# Patient Record
Sex: FEMALE | Race: OTHER | HISPANIC OR LATINO
[De-identification: names, ages, dates, MRNs, and addresses within clinical notes are randomized per-mention and may not be internally consistent; named-entity substitution may affect disease eponyms.]

---

## 2017-01-05 ENCOUNTER — RESULT REVIEW (OUTPATIENT)
Age: 77
End: 2017-01-05

## 2017-01-06 ENCOUNTER — INPATIENT (INPATIENT)
Facility: HOSPITAL | Age: 77
LOS: 0 days | Discharge: HOME | DRG: 419 | End: 2017-01-07
Attending: SURGERY | Admitting: SURGERY
Payer: MEDICARE

## 2017-01-06 VITALS
TEMPERATURE: 98 F | DIASTOLIC BLOOD PRESSURE: 72 MMHG | OXYGEN SATURATION: 98 % | SYSTOLIC BLOOD PRESSURE: 171 MMHG | HEART RATE: 86 BPM | RESPIRATION RATE: 22 BRPM

## 2017-01-06 DIAGNOSIS — Z01.818 ENCOUNTER FOR OTHER PREPROCEDURAL EXAMINATION: ICD-10-CM

## 2017-01-06 DIAGNOSIS — K81.1 CHRONIC CHOLECYSTITIS: ICD-10-CM

## 2017-01-06 DIAGNOSIS — K91.86 RETAINED CHOLELITHIASIS FOLLOWING CHOLECYSTECTOMY: ICD-10-CM

## 2017-01-06 PROCEDURE — 88304 TISSUE EXAM BY PATHOLOGIST: CPT | Mod: 26

## 2017-01-06 RX ORDER — ALBUTEROL 90 UG/1
2 AEROSOL, METERED ORAL EVERY 6 HOURS
Qty: 0 | Refills: 0 | Status: DISCONTINUED | OUTPATIENT
Start: 2017-01-06 | End: 2017-01-07

## 2017-01-06 RX ORDER — HEPARIN SODIUM 5000 [USP'U]/ML
5000 INJECTION INTRAVENOUS; SUBCUTANEOUS EVERY 8 HOURS
Qty: 0 | Refills: 0 | Status: DISCONTINUED | OUTPATIENT
Start: 2017-01-06 | End: 2017-01-07

## 2017-01-06 RX ORDER — FLUTICASONE PROPIONATE AND SALMETEROL 50; 250 UG/1; UG/1
1 POWDER ORAL; RESPIRATORY (INHALATION)
Qty: 0 | Refills: 0 | Status: DISCONTINUED | OUTPATIENT
Start: 2017-01-06 | End: 2017-01-07

## 2017-01-06 RX ORDER — SODIUM CHLORIDE 9 MG/ML
1000 INJECTION, SOLUTION INTRAVENOUS
Qty: 0 | Refills: 0 | Status: DISCONTINUED | OUTPATIENT
Start: 2017-01-06 | End: 2017-01-07

## 2017-01-06 RX ORDER — CEFOTETAN DISODIUM 1 G
2 VIAL (EA) INJECTION ONCE
Qty: 0 | Refills: 0 | Status: DISCONTINUED | OUTPATIENT
Start: 2017-01-06 | End: 2017-01-07

## 2017-01-06 RX ORDER — MORPHINE SULFATE 50 MG/1
2 CAPSULE, EXTENDED RELEASE ORAL
Qty: 0 | Refills: 0 | Status: DISCONTINUED | OUTPATIENT
Start: 2017-01-06 | End: 2017-01-06

## 2017-01-06 RX ORDER — ONDANSETRON 8 MG/1
4 TABLET, FILM COATED ORAL ONCE
Qty: 0 | Refills: 0 | Status: DISCONTINUED | OUTPATIENT
Start: 2017-01-06 | End: 2017-01-07

## 2017-01-06 RX ORDER — SODIUM CHLORIDE 9 MG/ML
3 INJECTION INTRAMUSCULAR; INTRAVENOUS; SUBCUTANEOUS EVERY 8 HOURS
Qty: 0 | Refills: 0 | Status: DISCONTINUED | OUTPATIENT
Start: 2017-01-06 | End: 2017-01-06

## 2017-01-06 RX ADMIN — SODIUM CHLORIDE 50 MILLILITER(S): 9 INJECTION, SOLUTION INTRAVENOUS at 22:15

## 2017-01-06 NOTE — ASU DISCHARGE PLAN (ADULT/PEDIATRIC). - NURSING INSTRUCTIONS
PRECAUTIONS—When Should I call the Physician?  Diarrhea: Occasional loose bowel movements are not uncommon. However, constant watery  diarrhea.  Fever with or without cough: This could be a sign of lung, wound or stomach infection.  Elevated heart rate: If your heart rate is more than 100 beats per minute, this could be a sign of  infection.  Sudden shortness of breath and/or chest pain  Sudden new stomach pain: This could be a sign of leakage around your stomach or an infection  in your stomach.  Wound drainage: gold colored drainage is normal, call however if your incisional drainage is  green, brown, has a foul odor, or becomes red and irritated.

## 2017-01-06 NOTE — ASU DISCHARGE PLAN (ADULT/PEDIATRIC). - MEDICATION SUMMARY - MEDICATIONS TO TAKE
I will START or STAY ON the medications listed below when I get home from the hospital:    Percocet 5/325 325 mg-5 mg oral tablet  -- 1 tab(s) by mouth every 4 hours, As Needed MDD:8 tabs  -- Caution federal law prohibits the transfer of this drug to any person other  than the person for whom it was prescribed.  May cause drowsiness.  Alcohol may intensify this effect.  Use care when operating dangerous machinery.  This prescription cannot be refilled.  This product contains acetaminophen.  Do not use  with any other product containing acetaminophen to prevent possible liver damage.  Using more of this medication than prescribed may cause serious breathing problems.    -- Indication: For pain    Symbicort 160 mcg-4.5 mcg/inh inhalation aerosol  -- 2 puff(s) inhaled 2 times a day  -- Indication: For Copd    ProAir HFA 90 mcg/inh inhalation aerosol  -- 2 puff(s) inhaled 4 times a day, As Needed  -- Indication: For Copd

## 2017-01-06 NOTE — ASU DISCHARGE PLAN (ADULT/PEDIATRIC). - NOTIFY
Persistent Nausea and Vomiting/Fever greater than 101/Unable to Urinate/Pain not relieved by Medications/Bleeding that does not stop

## 2017-01-06 NOTE — ASU DISCHARGE PLAN (ADULT/PEDIATRIC). - SPECIAL INSTRUCTIONS
There are small white bandaids over your incisions. They will either fall off on their own or be taken off in the office. Please allow water and soap to run over your incisions and pat them dry. Do not scrub the incisions.

## 2017-01-07 VITALS
OXYGEN SATURATION: 95 % | DIASTOLIC BLOOD PRESSURE: 54 MMHG | HEART RATE: 72 BPM | TEMPERATURE: 98 F | RESPIRATION RATE: 15 BRPM | SYSTOLIC BLOOD PRESSURE: 112 MMHG

## 2017-01-07 PROCEDURE — 94640 AIRWAY INHALATION TREATMENT: CPT

## 2017-01-07 PROCEDURE — 88304 TISSUE EXAM BY PATHOLOGIST: CPT

## 2017-01-07 PROCEDURE — C1889: CPT

## 2017-01-07 RX ADMIN — HEPARIN SODIUM 5000 UNIT(S): 5000 INJECTION INTRAVENOUS; SUBCUTANEOUS at 06:08

## 2017-01-07 RX ADMIN — FLUTICASONE PROPIONATE AND SALMETEROL 1 DOSE(S): 50; 250 POWDER ORAL; RESPIRATORY (INHALATION) at 06:08

## 2017-01-11 LAB — SURGICAL PATHOLOGY STUDY: SIGNIFICANT CHANGE UP

## 2017-04-14 ENCOUNTER — OUTPATIENT (OUTPATIENT)
Dept: OUTPATIENT SERVICES | Facility: HOSPITAL | Age: 77
LOS: 1 days | End: 2017-04-14
Payer: MEDICARE

## 2017-04-14 ENCOUNTER — APPOINTMENT (OUTPATIENT)
Dept: ULTRASOUND IMAGING | Facility: CLINIC | Age: 77
End: 2017-04-14

## 2017-04-14 DIAGNOSIS — Z00.8 ENCOUNTER FOR OTHER GENERAL EXAMINATION: ICD-10-CM

## 2017-04-14 PROCEDURE — 76700 US EXAM ABDOM COMPLETE: CPT

## 2017-09-28 ENCOUNTER — APPOINTMENT (OUTPATIENT)
Dept: OPHTHALMOLOGY | Facility: CLINIC | Age: 77
End: 2017-09-28
Payer: MEDICARE

## 2017-09-28 PROCEDURE — 68761 CLOSE TEAR DUCT OPENING: CPT | Mod: E2,E4

## 2017-09-28 PROCEDURE — 92134 CPTRZ OPH DX IMG PST SGM RTA: CPT

## 2017-09-28 PROCEDURE — 99204 OFFICE O/P NEW MOD 45 MIN: CPT

## 2017-09-28 PROCEDURE — 92002 INTRM OPH EXAM NEW PATIENT: CPT | Mod: 25

## 2017-10-09 ENCOUNTER — INPATIENT (INPATIENT)
Facility: HOSPITAL | Age: 77
LOS: 3 days | Discharge: HOME HEALTH | End: 2017-10-13
Attending: INTERNAL MEDICINE | Admitting: INTERNAL MEDICINE
Payer: MEDICARE

## 2017-10-09 VITALS
HEART RATE: 80 BPM | DIASTOLIC BLOOD PRESSURE: 85 MMHG | OXYGEN SATURATION: 89 % | RESPIRATION RATE: 24 BRPM | SYSTOLIC BLOOD PRESSURE: 197 MMHG | TEMPERATURE: 98 F

## 2017-10-09 RX ORDER — IPRATROPIUM/ALBUTEROL SULFATE 18-103MCG
3 AEROSOL WITH ADAPTER (GRAM) INHALATION ONCE
Qty: 0 | Refills: 0 | Status: COMPLETED | OUTPATIENT
Start: 2017-10-09 | End: 2017-10-09

## 2017-10-09 RX ADMIN — Medication 3 MILLILITER(S): at 23:57

## 2017-10-09 NOTE — ED ADULT TRIAGE NOTE - CHIEF COMPLAINT QUOTE
pt c/o increased SOB becoming wore this afternoon. pt using at home O2 3L, sat=89%. pt tachypneic in triage.

## 2017-10-10 DIAGNOSIS — I73.00 RAYNAUD'S SYNDROME WITHOUT GANGRENE: ICD-10-CM

## 2017-10-10 DIAGNOSIS — J44.1 CHRONIC OBSTRUCTIVE PULMONARY DISEASE WITH (ACUTE) EXACERBATION: ICD-10-CM

## 2017-10-10 DIAGNOSIS — K21.9 GASTRO-ESOPHAGEAL REFLUX DISEASE WITHOUT ESOPHAGITIS: ICD-10-CM

## 2017-10-10 DIAGNOSIS — J96.91 RESPIRATORY FAILURE, UNSPECIFIED WITH HYPOXIA: ICD-10-CM

## 2017-10-10 LAB
ALBUMIN SERPL ELPH-MCNC: 3.9 G/DL — SIGNIFICANT CHANGE UP (ref 3.3–5)
ALP SERPL-CCNC: 137 U/L — HIGH (ref 40–120)
ALT FLD-CCNC: 25 U/L — SIGNIFICANT CHANGE UP (ref 4–33)
AST SERPL-CCNC: 31 U/L — SIGNIFICANT CHANGE UP (ref 4–32)
B PERT DNA SPEC QL NAA+PROBE: SIGNIFICANT CHANGE UP
BASE EXCESS BLDV CALC-SCNC: 1 MMOL/L — SIGNIFICANT CHANGE UP
BASE EXCESS BLDV CALC-SCNC: 5.4 MMOL/L — SIGNIFICANT CHANGE UP
BASOPHILS # BLD AUTO: 0.04 K/UL — SIGNIFICANT CHANGE UP (ref 0–0.2)
BASOPHILS NFR BLD AUTO: 0.4 % — SIGNIFICANT CHANGE UP (ref 0–2)
BILIRUB SERPL-MCNC: 0.3 MG/DL — SIGNIFICANT CHANGE UP (ref 0.2–1.2)
BLOOD GAS VENOUS - CREATININE: 0.97 MG/DL — SIGNIFICANT CHANGE UP (ref 0.5–1.3)
BLOOD GAS VENOUS - CREATININE: 1.11 MG/DL — SIGNIFICANT CHANGE UP (ref 0.5–1.3)
BUN SERPL-MCNC: 25 MG/DL — HIGH (ref 7–23)
C PNEUM DNA SPEC QL NAA+PROBE: NOT DETECTED — SIGNIFICANT CHANGE UP
CALCIUM SERPL-MCNC: 9.9 MG/DL — SIGNIFICANT CHANGE UP (ref 8.4–10.5)
CHLORIDE BLDV-SCNC: 106 MMOL/L — SIGNIFICANT CHANGE UP (ref 96–108)
CHLORIDE BLDV-SCNC: 108 MMOL/L — SIGNIFICANT CHANGE UP (ref 96–108)
CHLORIDE SERPL-SCNC: 107 MMOL/L — SIGNIFICANT CHANGE UP (ref 98–107)
CO2 SERPL-SCNC: 27 MMOL/L — SIGNIFICANT CHANGE UP (ref 22–31)
CREAT SERPL-MCNC: 1.31 MG/DL — HIGH (ref 0.5–1.3)
EOSINOPHIL # BLD AUTO: 0.12 K/UL — SIGNIFICANT CHANGE UP (ref 0–0.5)
EOSINOPHIL NFR BLD AUTO: 1.2 % — SIGNIFICANT CHANGE UP (ref 0–6)
FLUAV H1 2009 PAND RNA SPEC QL NAA+PROBE: NOT DETECTED — SIGNIFICANT CHANGE UP
FLUAV H1 RNA SPEC QL NAA+PROBE: NOT DETECTED — SIGNIFICANT CHANGE UP
FLUAV H3 RNA SPEC QL NAA+PROBE: NOT DETECTED — SIGNIFICANT CHANGE UP
FLUAV SUBTYP SPEC NAA+PROBE: SIGNIFICANT CHANGE UP
FLUBV RNA SPEC QL NAA+PROBE: NOT DETECTED — SIGNIFICANT CHANGE UP
GAS PNL BLDV: 137 MMOL/L — SIGNIFICANT CHANGE UP (ref 136–146)
GAS PNL BLDV: 142 MMOL/L — SIGNIFICANT CHANGE UP (ref 136–146)
GLUCOSE BLDV-MCNC: 188 — HIGH (ref 70–99)
GLUCOSE BLDV-MCNC: 84 — SIGNIFICANT CHANGE UP (ref 70–99)
GLUCOSE SERPL-MCNC: 81 MG/DL — SIGNIFICANT CHANGE UP (ref 70–99)
HADV DNA SPEC QL NAA+PROBE: NOT DETECTED — SIGNIFICANT CHANGE UP
HCO3 BLDV-SCNC: 25 MMOL/L — SIGNIFICANT CHANGE UP (ref 20–27)
HCO3 BLDV-SCNC: 25 MMOL/L — SIGNIFICANT CHANGE UP (ref 20–27)
HCOV 229E RNA SPEC QL NAA+PROBE: NOT DETECTED — SIGNIFICANT CHANGE UP
HCOV HKU1 RNA SPEC QL NAA+PROBE: NOT DETECTED — SIGNIFICANT CHANGE UP
HCOV NL63 RNA SPEC QL NAA+PROBE: NOT DETECTED — SIGNIFICANT CHANGE UP
HCOV OC43 RNA SPEC QL NAA+PROBE: NOT DETECTED — SIGNIFICANT CHANGE UP
HCT VFR BLD CALC: 47 % — HIGH (ref 34.5–45)
HCT VFR BLDV CALC: 43.1 % — SIGNIFICANT CHANGE UP (ref 34.5–45)
HCT VFR BLDV CALC: 46.9 % — HIGH (ref 34.5–45)
HGB BLD-MCNC: 14.8 G/DL — SIGNIFICANT CHANGE UP (ref 11.5–15.5)
HGB BLDV-MCNC: 14 G/DL — SIGNIFICANT CHANGE UP (ref 11.5–15.5)
HGB BLDV-MCNC: 15.3 G/DL — SIGNIFICANT CHANGE UP (ref 11.5–15.5)
HMPV RNA SPEC QL NAA+PROBE: NOT DETECTED — SIGNIFICANT CHANGE UP
HPIV1 RNA SPEC QL NAA+PROBE: NOT DETECTED — SIGNIFICANT CHANGE UP
HPIV2 RNA SPEC QL NAA+PROBE: NOT DETECTED — SIGNIFICANT CHANGE UP
HPIV3 RNA SPEC QL NAA+PROBE: NOT DETECTED — SIGNIFICANT CHANGE UP
HPIV4 RNA SPEC QL NAA+PROBE: NOT DETECTED — SIGNIFICANT CHANGE UP
IMM GRANULOCYTES # BLD AUTO: 0.03 # — SIGNIFICANT CHANGE UP
IMM GRANULOCYTES NFR BLD AUTO: 0.3 % — SIGNIFICANT CHANGE UP (ref 0–1.5)
LACTATE BLDV-MCNC: 1.5 MMOL/L — SIGNIFICANT CHANGE UP (ref 0.5–2)
LACTATE BLDV-MCNC: 2.9 MMOL/L — HIGH (ref 0.5–2)
LYMPHOCYTES # BLD AUTO: 3.58 K/UL — HIGH (ref 1–3.3)
LYMPHOCYTES # BLD AUTO: 37.2 % — SIGNIFICANT CHANGE UP (ref 13–44)
M PNEUMO DNA SPEC QL NAA+PROBE: NOT DETECTED — SIGNIFICANT CHANGE UP
MCHC RBC-ENTMCNC: 30.8 PG — SIGNIFICANT CHANGE UP (ref 27–34)
MCHC RBC-ENTMCNC: 31.5 % — LOW (ref 32–36)
MCV RBC AUTO: 97.7 FL — SIGNIFICANT CHANGE UP (ref 80–100)
MONOCYTES # BLD AUTO: 0.99 K/UL — HIGH (ref 0–0.9)
MONOCYTES NFR BLD AUTO: 10.3 % — SIGNIFICANT CHANGE UP (ref 2–14)
NEUTROPHILS # BLD AUTO: 4.87 K/UL — SIGNIFICANT CHANGE UP (ref 1.8–7.4)
NEUTROPHILS NFR BLD AUTO: 50.6 % — SIGNIFICANT CHANGE UP (ref 43–77)
NRBC # FLD: 0 — SIGNIFICANT CHANGE UP
PCO2 BLDV: 44 MMHG — SIGNIFICANT CHANGE UP (ref 41–51)
PCO2 BLDV: 65 MMHG — HIGH (ref 41–51)
PH BLDV: 7.31 PH — LOW (ref 7.32–7.43)
PH BLDV: 7.38 PH — SIGNIFICANT CHANGE UP (ref 7.32–7.43)
PLATELET # BLD AUTO: 209 K/UL — SIGNIFICANT CHANGE UP (ref 150–400)
PMV BLD: 11.1 FL — SIGNIFICANT CHANGE UP (ref 7–13)
PO2 BLDV: 50 MMHG — HIGH (ref 35–40)
PO2 BLDV: < 24 MMHG — LOW (ref 35–40)
POTASSIUM BLDV-SCNC: 4 MMOL/L — SIGNIFICANT CHANGE UP (ref 3.4–4.5)
POTASSIUM BLDV-SCNC: 4.5 MMOL/L — SIGNIFICANT CHANGE UP (ref 3.4–4.5)
POTASSIUM SERPL-MCNC: 5 MMOL/L — SIGNIFICANT CHANGE UP (ref 3.5–5.3)
POTASSIUM SERPL-SCNC: 5 MMOL/L — SIGNIFICANT CHANGE UP (ref 3.5–5.3)
PROT SERPL-MCNC: 7.3 G/DL — SIGNIFICANT CHANGE UP (ref 6–8.3)
RBC # BLD: 4.81 M/UL — SIGNIFICANT CHANGE UP (ref 3.8–5.2)
RBC # FLD: 14.5 % — SIGNIFICANT CHANGE UP (ref 10.3–14.5)
RSV RNA SPEC QL NAA+PROBE: NOT DETECTED — SIGNIFICANT CHANGE UP
RV+EV RNA SPEC QL NAA+PROBE: NOT DETECTED — SIGNIFICANT CHANGE UP
SAO2 % BLDV: 22.4 % — LOW (ref 60–85)
SAO2 % BLDV: 82.7 % — SIGNIFICANT CHANGE UP (ref 60–85)
SODIUM SERPL-SCNC: 147 MMOL/L — HIGH (ref 135–145)
WBC # BLD: 9.63 K/UL — SIGNIFICANT CHANGE UP (ref 3.8–10.5)
WBC # FLD AUTO: 9.63 K/UL — SIGNIFICANT CHANGE UP (ref 3.8–10.5)

## 2017-10-10 PROCEDURE — 71275 CT ANGIOGRAPHY CHEST: CPT | Mod: 26

## 2017-10-10 PROCEDURE — 71010: CPT | Mod: 26

## 2017-10-10 RX ORDER — HEPARIN SODIUM 5000 [USP'U]/ML
5000 INJECTION INTRAVENOUS; SUBCUTANEOUS EVERY 12 HOURS
Qty: 0 | Refills: 0 | Status: DISCONTINUED | OUTPATIENT
Start: 2017-10-10 | End: 2017-10-13

## 2017-10-10 RX ORDER — AZITHROMYCIN 500 MG/1
500 TABLET, FILM COATED ORAL ONCE
Qty: 0 | Refills: 0 | Status: COMPLETED | OUTPATIENT
Start: 2017-10-10 | End: 2017-10-10

## 2017-10-10 RX ORDER — SODIUM CHLORIDE 9 MG/ML
1000 INJECTION INTRAMUSCULAR; INTRAVENOUS; SUBCUTANEOUS
Qty: 0 | Refills: 0 | Status: DISCONTINUED | OUTPATIENT
Start: 2017-10-10 | End: 2017-10-13

## 2017-10-10 RX ORDER — OXYCODONE AND ACETAMINOPHEN 5; 325 MG/1; MG/1
1 TABLET ORAL EVERY 4 HOURS
Qty: 0 | Refills: 0 | Status: DISCONTINUED | OUTPATIENT
Start: 2017-10-10 | End: 2017-10-12

## 2017-10-10 RX ORDER — PANTOPRAZOLE SODIUM 20 MG/1
40 TABLET, DELAYED RELEASE ORAL
Qty: 0 | Refills: 0 | Status: DISCONTINUED | OUTPATIENT
Start: 2017-10-10 | End: 2017-10-13

## 2017-10-10 RX ORDER — IPRATROPIUM/ALBUTEROL SULFATE 18-103MCG
3 AEROSOL WITH ADAPTER (GRAM) INHALATION EVERY 6 HOURS
Qty: 0 | Refills: 0 | Status: DISCONTINUED | OUTPATIENT
Start: 2017-10-10 | End: 2017-10-13

## 2017-10-10 RX ADMIN — SODIUM CHLORIDE 50 MILLILITER(S): 9 INJECTION INTRAMUSCULAR; INTRAVENOUS; SUBCUTANEOUS at 12:30

## 2017-10-10 RX ADMIN — Medication 3 MILLILITER(S): at 10:17

## 2017-10-10 RX ADMIN — Medication 125 MILLIGRAM(S): at 00:26

## 2017-10-10 RX ADMIN — Medication 3 MILLILITER(S): at 21:52

## 2017-10-10 RX ADMIN — Medication 40 MILLIGRAM(S): at 13:47

## 2017-10-10 RX ADMIN — Medication 40 MILLIGRAM(S): at 20:06

## 2017-10-10 RX ADMIN — AZITHROMYCIN 250 MILLIGRAM(S): 500 TABLET, FILM COATED ORAL at 01:14

## 2017-10-10 NOTE — H&P ADULT - PROBLEM SELECTOR PLAN 1
S/P BIPAP ..Doing very well so will dc BIPAP and start patient on NC 3L . Will check ABG in an hour. Pt on home oxygen 2lNC

## 2017-10-10 NOTE — H&P ADULT - RS GEN PE MLT RESP DETAILS PC
normal/diminished breath sounds, R/airway patent/no intercostal retractions/rales/respirations non-labored/diminished breath sounds, L/no chest wall tenderness

## 2017-10-10 NOTE — ED ADULT NURSE REASSESSMENT NOTE - NS ED NURSE REASSESS COMMENT FT1
pt removed from Bipap for trail on NC 3L/min by Medicine NP.  Will continue to observe and monitor for changes. pt in no acute distress at this time.

## 2017-10-10 NOTE — CONSULT NOTE ADULT - ASSESSMENT
Assessment:    COPD exacerbation/SOB/hypoxemia - pt currently off BIPAP on 2 -3 liters NC    Atypical/pleuritic chest pains    Plan:  Increase Solumedrol to 40 mg q 6 hours  Continue Duoneb via neb treatments  Chest CTA - with hx of pleuritic pain and smoking hx, along with eval for SOB  Continue supplemental O2 (NB that pCO2 venous was initially elevated, so caution with use) - aim for sats >90%;  May use intermittent BIPAP as needed.

## 2017-10-10 NOTE — ED ADULT NURSE REASSESSMENT NOTE - NS ED NURSE REASSESS COMMENT FT1
pt placed back on BiPaP by Kiara Bermudez NP after failed NC trial. pt tolerating well at this time. pt in no acute distress.

## 2017-10-10 NOTE — CONSULT NOTE ADULT - SUBJECTIVE AND OBJECTIVE BOX
HPI:  77HF former smoker (2 PPD x 40 years, quitting 14 years ago) with hx of COPD on 3L home O2 admitted with  1 week gradually worsening congestion and sob, particularly worse over the past 24 hours. Has non productive cough.   (Hx from patient, son James at bedside, abd dtr by phone)  The patient has also had lower rib pains recently and yesterday, increased with deep inspiration.   No other chest pains or palpitations.  In the ED, pt has been feeling somewhat better, having received Solumedrol, neb treatments, and earlier being placed on BIPAP.    No recent travel. Feels like her typical COPD exacerbations. No known sick contacts. SOB worse with exertion. No leg swelling. (10 Oct 2017 09:17).  (The patient's outside pulmonologist is Dr. Richard Rose.)  She was recently tapered off prednisone.   At home she was on Spiriva and Proair HFA.        MEDICATIONS  (STANDING):  ALBUTerol/ipratropium for Nebulization 3 milliLiter(s) Nebulizer every 6 hours  heparin  Injectable 5000 Unit(s) SubCutaneous every 12 hours  methylPREDNISolone sodium succinate Injectable 40 milliGRAM(s) IV Push every 6 hours  pantoprazole    Tablet 40 milliGRAM(s) Oral before breakfast  sodium chloride 0.9%. 1000 milliLiter(s) (50 mL/Hr) IV Continuous <Continuous>    MEDICATIONS  (PRN):  oxyCODONE    5 mG/acetaminophen 325 mG 1 Tablet(s) Oral every 4 hours PRN Moderate Pain (4 - 6)      PAST MEDICAL & SURGICAL HISTORY:  Raynauds disease  GERD (gastroesophageal reflux disease)  COPD (Chronic Obstructive Pulmonary Disease): no intubation hx  last exacerb 11/2016  PRN 2 L NC  S/P left cataract extraction  History of appendectomy      FAMILY HISTORY:  No pertinent family history in first degree relatives      SOCIAL HISTORY  Smoking History:  see above        PHYSICAL EXAM:  Vital Signs Last 24 Hrs  T(C): 36.7 (10 Oct 2017 17:30), Max: 36.7 (10 Oct 2017 12:06)  T(F): 98.1 (10 Oct 2017 17:30), Max: 98.1 (10 Oct 2017 17:30)  HR: 71 (10 Oct 2017 17:30) (67 - 80)  BP: 147/72 (10 Oct 2017 17:30) (132/66 - 197/85)  BP(mean): 86 (10 Oct 2017 03:45) (86 - 86)  RR: 20 (10 Oct 2017 15:49) (20 - 25)  SpO2: 95% (10 Oct 2017 17:30) (89% - 100%)  GENERAL: NAD, awake, alert; presently on 3 liters nasal cannula (sats 87-92%)    ENMT: Moist mucous membranes,   NECK: Supple  NERVOUS SYSTEM:  Alert   CHEST/LUNG: Bilateral rhonchi; No wheezing  HEART: Regular rate and rhythm; No murmurs, rubs, or gallops  ABDOMEN: Soft, Nontender, Nondistended; Bowel sounds present  EXTREMITIES: No edema; minimal calf tenderness; no swelling;  Johanna's sign negative        LABS:                        14.8   9.63  )-----------( 209      ( 10 Oct 2017 00:00 )             47.0     10-10    147<H>  |  107  |  25<H>  ----------------------------<  81  5.0   |  27  |  1.31<H>    Ca    9.9      10 Oct 2017 00:00    TPro  7.3  /  Alb  3.9  /  TBili  0.3  /  DBili  x   /  AST  31  /  ALT  25  /  AlkPhos  137<H>  10-10          Microbiology  Rapid Respiratory Viral Panel (10.29.16 @ 23:34)    Rapid RVP Result: NotDetec:           RADIOLOGY & ADDITIONAL STUDIES:      Xray: < from: Xray Chest 1 View AP- PORTABLE-Urgent (10.10.17 @ 00:03) >    EXAM:  RAD CHEST PORTABLE URGENT        PROCEDURE DATE:  Oct 10 2017         INTERPRETATION:  CLINICAL INDICATION: Shortness of breath, COPD   exacerbation.    TECHNIQUE: Portable AP view of the chest dated 10/9/2017.    COMPARISON: Chest radiograph 10/29/2016.    FINDINGS:    The lungs are clear.   There is no pneumothorax or pleural effusion.  The heart size is within normal limits.  The bones are unremarkable.    IMPRESSION:     Clear lungs.     SKY GONZALEZ M.D., RADIOLOGY RESIDENT  This document has been electronically signed.  JOHN MODI M.D.; ATTENDING RADIOLOGIST  This document has been electronically signed. Oct 10 2017  6:04AM    < end of copied text >

## 2017-10-10 NOTE — ED PROVIDER NOTE - MEDICAL DECISION MAKING DETAILS
77F with COPD exacerbation with evidence of increased work of breathing though not respiratory distress. Started on BIPAP (hx of Bipap use). Will assess CXR for r/o PNA, rvp as will likely require admission, cbc, cmp, vbg, give steroids, nebs.

## 2017-10-10 NOTE — CHART NOTE - NSCHARTNOTEFT_GEN_A_CORE
Spoke with Dr. Hoskins (pulmonary) to update on patients status.  Currently appears comfortable on bipap with family at bedside.    As per family patient uses 3L NC at home.  At this time will trial patient off of Bipap.  Will keep bipap machine at bedside in case it is warranted and obtain house pulmonary consult for pulmonary bed if bipap required again.   As requested will change solumedrol order to every 6 hours instead of every 8 hours.    Spoke with family at beside who agrees with plan.    Will sign out to overnight staff to keep close eye on patient and inform Dr. Hoskins with any issues/concerns/updates./

## 2017-10-10 NOTE — ED ADULT NURSE REASSESSMENT NOTE - NS ED NURSE REASSESS COMMENT FT1
report received from CORIE Chaudhry, pt remains AAOx3, pt in NAD, awaiting admission/bed assignment. remains on BIPAP. will continue to monitor pt.

## 2017-10-10 NOTE — H&P ADULT - PROBLEM SELECTOR PLAN 2
S/P IV Solumedrol and neb Rx so will continue Neb and Iv solmedrol for now . Will change to po on Dc

## 2017-10-10 NOTE — PATIENT PROFILE ADULT. - LIVES WITH, PROFILE
(daughter), private home, 4 steps-to-enter/exit home, 11 stairs in the home (both with unilateral handrail)/children

## 2017-10-10 NOTE — ED PROVIDER NOTE - PROGRESS NOTE DETAILS
Improved on BIpap; Dr. Arturo Rose paged x 1 regarding pt need for admission. No pulm beds available. Received callback from Dr. Rose, does not admit to [facility] requests admission to Dr. Fields if possible, Dr. Fisher as alternative. Received callback from Dr. Rose, does not admit to [facility] requests admission to Dr. Fields if possible, Dr. Fisher as alternative. Unable to reach Dr. Fields by office or cell phone. Message left for Dr. Fisher on his cell phone. Received callback from Dr. Rose, does not admit to [facility] requests admission to Dr. Fields if possible, Dr. Fisher as alternative. Unable to reach Dr. Fields by office or cell phone. Dr. Fisher paged x 1. Dr. Fisher paged x 2. Unable to reach Dr. Fields or Dr. Fisher despite multiple calls. D/w hospitalist who also attempted to reach Dr. Fields/Natalia unsuccessfully. Accepted for admission to hospitalist (Dr. Gonzalez). MAR textpage sent. [place]: Pt much improved on bipap. stable, clinically does not require icu level of care at this time.

## 2017-10-10 NOTE — ED ADULT NURSE REASSESSMENT NOTE - NS ED NURSE REASSESS COMMENT FT1
pt seen by Pulmonologist. pt given ordered neb O2 Sat- 87% with NC @ 3L/min. Kiara Bermudez NP notified at bedside to evaluate pt.

## 2017-10-10 NOTE — ED ADULT NURSE REASSESSMENT NOTE - NS ED NURSE REASSESS COMMENT FT1
pt endorsed AAOx3 with cardiac monitor in place. pt with Bipap IPAP-10/ EPAP-5. pt has #20G LAC. no s/s of infiltration or redness noted. VSS as charted. pt resting comfortable. pt admitted to Medicine awaiting bed at this time. pt in no acute distress.

## 2017-10-10 NOTE — H&P ADULT - NEGATIVE ENMT SYMPTOMS
no nasal discharge/no sinus symptoms/no nasal congestion/no nasal obstruction/no post-nasal discharge/no hearing difficulty/no ear pain

## 2017-10-10 NOTE — ED PROVIDER NOTE - OBJECTIVE STATEMENT
77F PMH COPD on 3L home O2 p/w 1 week gradually worsening congestion and sob, particularly worse over the past 24 hours. No chest pain. +nonproductive cough. No NVD. No recent travel. Feels like her typical COPD exacerbations. No known sick contacts. SOB worse with exertion. No leg pain/swelling.

## 2017-10-10 NOTE — ED ADULT NURSE NOTE - OBJECTIVE STATEMENT
Pt recd A+Ox3. Brought in for COPD exacerbation. SOB started intermittent 1 week ago and has gradually gotten worse with no relief from rescue inhaler. + non productive cough. Pt is on 3L NC at home. O2 sat 80s on arrival with +WOB. Placed on Bipap. Labs drawn and sent. 20G IV placed in L AC. Cardiac monitor in place. MD assessing patient at bedside. Pt is breathing equal and unlabored at this time on bipap and denies and CP. Will continue to monitor.

## 2017-10-10 NOTE — ED ADULT NURSE REASSESSMENT NOTE - NS ED NURSE REASSESS COMMENT FT1
Pt appears to be tolerating CPAP well, oxygen saturation at 96%. Pt denies any c/o pain at this time. NS at 50ml/hr infusing well via #20g left ac. Report given off to primary RN on [facility]south. Family members at bedside. Awaiting CT scan and then transport.

## 2017-10-10 NOTE — ED PROVIDER NOTE - PMH
COPD (Chronic Obstructive Pulmonary Disease)  no intubation hx  last exacerb 11/2016  PRN 2 L NC  GERD (gastroesophageal reflux disease)    Raynauds disease

## 2017-10-10 NOTE — H&P ADULT - HISTORY OF PRESENT ILLNESS
77F PMH COPD on 3L home O2 p/w 1 week gradually worsening congestion and sob, particularly worse over the past 24 hours. Has non productive cough. . No recent travel. Feels like her typical COPD exacerbations. No known sick contacts. SOB worse with exertion. No leg pain/swelling.

## 2017-10-11 DIAGNOSIS — R91.1 SOLITARY PULMONARY NODULE: ICD-10-CM

## 2017-10-11 LAB
ALBUMIN SERPL ELPH-MCNC: 3.8 G/DL — SIGNIFICANT CHANGE UP (ref 3.3–5)
ALP SERPL-CCNC: 127 U/L — HIGH (ref 40–120)
ALT FLD-CCNC: 19 U/L — SIGNIFICANT CHANGE UP (ref 4–33)
AST SERPL-CCNC: 19 U/L — SIGNIFICANT CHANGE UP (ref 4–32)
BILIRUB SERPL-MCNC: 0.3 MG/DL — SIGNIFICANT CHANGE UP (ref 0.2–1.2)
BUN SERPL-MCNC: 26 MG/DL — HIGH (ref 7–23)
BUN SERPL-MCNC: 26 MG/DL — HIGH (ref 7–23)
CALCIUM SERPL-MCNC: 9.4 MG/DL — SIGNIFICANT CHANGE UP (ref 8.4–10.5)
CALCIUM SERPL-MCNC: 9.4 MG/DL — SIGNIFICANT CHANGE UP (ref 8.4–10.5)
CHLORIDE SERPL-SCNC: 105 MMOL/L — SIGNIFICANT CHANGE UP (ref 98–107)
CHLORIDE SERPL-SCNC: 105 MMOL/L — SIGNIFICANT CHANGE UP (ref 98–107)
CO2 SERPL-SCNC: 23 MMOL/L — SIGNIFICANT CHANGE UP (ref 22–31)
CO2 SERPL-SCNC: 23 MMOL/L — SIGNIFICANT CHANGE UP (ref 22–31)
CREAT SERPL-MCNC: 0.95 MG/DL — SIGNIFICANT CHANGE UP (ref 0.5–1.3)
CREAT SERPL-MCNC: 0.95 MG/DL — SIGNIFICANT CHANGE UP (ref 0.5–1.3)
GLUCOSE BLDC GLUCOMTR-MCNC: 135 MG/DL — HIGH (ref 70–99)
GLUCOSE BLDC GLUCOMTR-MCNC: 181 MG/DL — HIGH (ref 70–99)
GLUCOSE SERPL-MCNC: 209 MG/DL — HIGH (ref 70–99)
GLUCOSE SERPL-MCNC: 209 MG/DL — HIGH (ref 70–99)
HCT VFR BLD CALC: 44.8 % — SIGNIFICANT CHANGE UP (ref 34.5–45)
HGB BLD-MCNC: 14.2 G/DL — SIGNIFICANT CHANGE UP (ref 11.5–15.5)
MAGNESIUM SERPL-MCNC: 1.9 MG/DL — SIGNIFICANT CHANGE UP (ref 1.6–2.6)
MCHC RBC-ENTMCNC: 30.3 PG — SIGNIFICANT CHANGE UP (ref 27–34)
MCHC RBC-ENTMCNC: 31.7 % — LOW (ref 32–36)
MCV RBC AUTO: 95.7 FL — SIGNIFICANT CHANGE UP (ref 80–100)
NRBC # FLD: 0 — SIGNIFICANT CHANGE UP
PHOSPHATE SERPL-MCNC: 3.3 MG/DL — SIGNIFICANT CHANGE UP (ref 2.5–4.5)
PLATELET # BLD AUTO: 194 K/UL — SIGNIFICANT CHANGE UP (ref 150–400)
PMV BLD: 11.8 FL — SIGNIFICANT CHANGE UP (ref 7–13)
POTASSIUM SERPL-MCNC: 4.3 MMOL/L — SIGNIFICANT CHANGE UP (ref 3.5–5.3)
POTASSIUM SERPL-MCNC: 4.3 MMOL/L — SIGNIFICANT CHANGE UP (ref 3.5–5.3)
POTASSIUM SERPL-SCNC: 4.3 MMOL/L — SIGNIFICANT CHANGE UP (ref 3.5–5.3)
POTASSIUM SERPL-SCNC: 4.3 MMOL/L — SIGNIFICANT CHANGE UP (ref 3.5–5.3)
PROT SERPL-MCNC: 6.7 G/DL — SIGNIFICANT CHANGE UP (ref 6–8.3)
RBC # BLD: 4.68 M/UL — SIGNIFICANT CHANGE UP (ref 3.8–5.2)
RBC # FLD: 14.5 % — SIGNIFICANT CHANGE UP (ref 10.3–14.5)
SODIUM SERPL-SCNC: 143 MMOL/L — SIGNIFICANT CHANGE UP (ref 135–145)
SODIUM SERPL-SCNC: 143 MMOL/L — SIGNIFICANT CHANGE UP (ref 135–145)
WBC # BLD: 12.27 K/UL — HIGH (ref 3.8–10.5)
WBC # FLD AUTO: 12.27 K/UL — HIGH (ref 3.8–10.5)

## 2017-10-11 RX ORDER — DEXTROSE 50 % IN WATER 50 %
25 SYRINGE (ML) INTRAVENOUS ONCE
Qty: 0 | Refills: 0 | Status: DISCONTINUED | OUTPATIENT
Start: 2017-10-11 | End: 2017-10-13

## 2017-10-11 RX ORDER — SODIUM CHLORIDE 9 MG/ML
1000 INJECTION, SOLUTION INTRAVENOUS
Qty: 0 | Refills: 0 | Status: DISCONTINUED | OUTPATIENT
Start: 2017-10-11 | End: 2017-10-13

## 2017-10-11 RX ORDER — DEXTROSE 50 % IN WATER 50 %
1 SYRINGE (ML) INTRAVENOUS ONCE
Qty: 0 | Refills: 0 | Status: DISCONTINUED | OUTPATIENT
Start: 2017-10-11 | End: 2017-10-13

## 2017-10-11 RX ORDER — DEXTROSE 50 % IN WATER 50 %
12.5 SYRINGE (ML) INTRAVENOUS ONCE
Qty: 0 | Refills: 0 | Status: DISCONTINUED | OUTPATIENT
Start: 2017-10-11 | End: 2017-10-13

## 2017-10-11 RX ORDER — GLUCAGON INJECTION, SOLUTION 0.5 MG/.1ML
1 INJECTION, SOLUTION SUBCUTANEOUS ONCE
Qty: 0 | Refills: 0 | Status: DISCONTINUED | OUTPATIENT
Start: 2017-10-11 | End: 2017-10-13

## 2017-10-11 RX ORDER — INSULIN LISPRO 100/ML
VIAL (ML) SUBCUTANEOUS AT BEDTIME
Qty: 0 | Refills: 0 | Status: DISCONTINUED | OUTPATIENT
Start: 2017-10-11 | End: 2017-10-13

## 2017-10-11 RX ORDER — INSULIN LISPRO 100/ML
VIAL (ML) SUBCUTANEOUS
Qty: 0 | Refills: 0 | Status: DISCONTINUED | OUTPATIENT
Start: 2017-10-11 | End: 2017-10-13

## 2017-10-11 RX ADMIN — HEPARIN SODIUM 5000 UNIT(S): 5000 INJECTION INTRAVENOUS; SUBCUTANEOUS at 05:56

## 2017-10-11 RX ADMIN — OXYCODONE AND ACETAMINOPHEN 1 TABLET(S): 5; 325 TABLET ORAL at 08:45

## 2017-10-11 RX ADMIN — Medication 1: at 12:15

## 2017-10-11 RX ADMIN — HEPARIN SODIUM 5000 UNIT(S): 5000 INJECTION INTRAVENOUS; SUBCUTANEOUS at 17:59

## 2017-10-11 RX ADMIN — Medication 40 MILLIGRAM(S): at 03:01

## 2017-10-11 RX ADMIN — Medication 40 MILLIGRAM(S): at 20:27

## 2017-10-11 RX ADMIN — Medication 3 MILLILITER(S): at 21:57

## 2017-10-11 RX ADMIN — Medication 3 MILLILITER(S): at 10:42

## 2017-10-11 RX ADMIN — Medication 40 MILLIGRAM(S): at 08:10

## 2017-10-11 RX ADMIN — Medication 3 MILLILITER(S): at 03:11

## 2017-10-11 RX ADMIN — Medication 40 MILLIGRAM(S): at 14:03

## 2017-10-11 RX ADMIN — PANTOPRAZOLE SODIUM 40 MILLIGRAM(S): 20 TABLET, DELAYED RELEASE ORAL at 05:56

## 2017-10-11 RX ADMIN — OXYCODONE AND ACETAMINOPHEN 1 TABLET(S): 5; 325 TABLET ORAL at 08:10

## 2017-10-11 RX ADMIN — Medication 3 MILLILITER(S): at 16:02

## 2017-10-11 NOTE — PROGRESS NOTE ADULT - PROBLEM SELECTOR PLAN 3
New 7 mm RUL nodule (DDx includes malignancy)    Would recommend short term f/u chest CT as outpt  (Pt will followup with her outside pulm dr, Dr. Rose)  (Dtr notified yesterday)

## 2017-10-11 NOTE — PROGRESS NOTE ADULT - PROBLEM SELECTOR PLAN 2
No BIPAP use since yesterday;  Can reduce FiO2 fidencio while at rest, and monitor sats;  Could transiently increase FiO2 when patient doing walking, etc.

## 2017-10-11 NOTE — PROGRESS NOTE ADULT - PROBLEM SELECTOR PLAN 1
Significantly improved  Would reduce steroids (received 8 PM dose; can change Solumedrol to 40 mg q 12)

## 2017-10-12 LAB
BUN SERPL-MCNC: 31 MG/DL — HIGH (ref 7–23)
CALCIUM SERPL-MCNC: 9.1 MG/DL — SIGNIFICANT CHANGE UP (ref 8.4–10.5)
CHLORIDE SERPL-SCNC: 106 MMOL/L — SIGNIFICANT CHANGE UP (ref 98–107)
CO2 SERPL-SCNC: 24 MMOL/L — SIGNIFICANT CHANGE UP (ref 22–31)
CREAT SERPL-MCNC: 1.08 MG/DL — SIGNIFICANT CHANGE UP (ref 0.5–1.3)
GLUCOSE BLDC GLUCOMTR-MCNC: 131 MG/DL — HIGH (ref 70–99)
GLUCOSE BLDC GLUCOMTR-MCNC: 133 MG/DL — HIGH (ref 70–99)
GLUCOSE BLDC GLUCOMTR-MCNC: 166 MG/DL — HIGH (ref 70–99)
GLUCOSE BLDC GLUCOMTR-MCNC: 175 MG/DL — HIGH (ref 70–99)
GLUCOSE SERPL-MCNC: 147 MG/DL — HIGH (ref 70–99)
HBA1C BLD-MCNC: 6.4 % — HIGH (ref 4–5.6)
HCT VFR BLD CALC: 41.5 % — SIGNIFICANT CHANGE UP (ref 34.5–45)
HGB BLD-MCNC: 13.4 G/DL — SIGNIFICANT CHANGE UP (ref 11.5–15.5)
MCHC RBC-ENTMCNC: 30.9 PG — SIGNIFICANT CHANGE UP (ref 27–34)
MCHC RBC-ENTMCNC: 32.3 % — SIGNIFICANT CHANGE UP (ref 32–36)
MCV RBC AUTO: 95.8 FL — SIGNIFICANT CHANGE UP (ref 80–100)
NRBC # FLD: 0 — SIGNIFICANT CHANGE UP
PLATELET # BLD AUTO: 170 K/UL — SIGNIFICANT CHANGE UP (ref 150–400)
PMV BLD: 11.5 FL — SIGNIFICANT CHANGE UP (ref 7–13)
POTASSIUM SERPL-MCNC: 4.4 MMOL/L — SIGNIFICANT CHANGE UP (ref 3.5–5.3)
POTASSIUM SERPL-SCNC: 4.4 MMOL/L — SIGNIFICANT CHANGE UP (ref 3.5–5.3)
RBC # BLD: 4.33 M/UL — SIGNIFICANT CHANGE UP (ref 3.8–5.2)
RBC # FLD: 14.4 % — SIGNIFICANT CHANGE UP (ref 10.3–14.5)
SODIUM SERPL-SCNC: 142 MMOL/L — SIGNIFICANT CHANGE UP (ref 135–145)
WBC # BLD: 12.94 K/UL — HIGH (ref 3.8–10.5)
WBC # FLD AUTO: 12.94 K/UL — HIGH (ref 3.8–10.5)

## 2017-10-12 RX ORDER — OXYCODONE AND ACETAMINOPHEN 5; 325 MG/1; MG/1
1 TABLET ORAL EVERY 4 HOURS
Qty: 0 | Refills: 0 | Status: DISCONTINUED | OUTPATIENT
Start: 2017-10-12 | End: 2017-10-13

## 2017-10-12 RX ADMIN — HEPARIN SODIUM 5000 UNIT(S): 5000 INJECTION INTRAVENOUS; SUBCUTANEOUS at 18:00

## 2017-10-12 RX ADMIN — Medication 3 MILLILITER(S): at 03:34

## 2017-10-12 RX ADMIN — HEPARIN SODIUM 5000 UNIT(S): 5000 INJECTION INTRAVENOUS; SUBCUTANEOUS at 05:13

## 2017-10-12 RX ADMIN — PANTOPRAZOLE SODIUM 40 MILLIGRAM(S): 20 TABLET, DELAYED RELEASE ORAL at 05:13

## 2017-10-12 RX ADMIN — Medication 40 MILLIGRAM(S): at 18:00

## 2017-10-12 RX ADMIN — Medication 1: at 13:12

## 2017-10-12 RX ADMIN — Medication 3 MILLILITER(S): at 15:13

## 2017-10-12 RX ADMIN — Medication 40 MILLIGRAM(S): at 05:12

## 2017-10-12 RX ADMIN — Medication 3 MILLILITER(S): at 21:01

## 2017-10-12 RX ADMIN — Medication 3 MILLILITER(S): at 10:29

## 2017-10-12 NOTE — PHYSICAL THERAPY INITIAL EVALUATION ADULT - GENERAL OBSERVATIONS, REHAB EVAL
Pt. received in chair in NAD with multiple family members at bedside.  Pt. offers no new c/o at rest.

## 2017-10-12 NOTE — PHYSICAL THERAPY INITIAL EVALUATION ADULT - PERTINENT HX OF CURRENT PROBLEM, REHAB EVAL
Pt. is a 78 y/o [place]-speaking female admitted to [facility] on 10/10/17 with a dx of acute exacerbation of COPD and SOB.  PT consult request secondary to weakness.   H/O Raynaud's.  (-) CXR.  (-) CT angio of chest for PE.

## 2017-10-12 NOTE — PHYSICAL THERAPY INITIAL EVALUATION ADULT - CRITERIA FOR SKILLED THERAPEUTIC INTERVENTIONS
anticipated discharge recommendation/therapy frequency/impairments found/anticipated equipment needs at discharge/predicted duration of therapy intervention/Outpatient pulmonary rehab and a rollator

## 2017-10-12 NOTE — PHYSICAL THERAPY INITIAL EVALUATION ADULT - IMPAIRMENTS CONTRIBUTING TO GAIT DEVIATIONS, PT EVAL
impaired balance/functional endurance decreased --> COPE noted, despite use of supplemental O2 --> CORIE florez

## 2017-10-12 NOTE — PHYSICAL THERAPY INITIAL EVALUATION ADULT - ADDITIONAL COMMENTS
Pt. owns a rolling walker but typically ambulates without assistive device.      Pt. left seated in chair post-PT in NAD with all lines/tubes intact & table/call bell within reach.  Multiple family members at bedside.  CORIE Camp aware. Pt. owns a rolling walker but typically ambulates without assistive device.  24/7 home O2 used.      Pt. left seated in chair post-PT in NAD with all lines/tubes intact & table/call bell within reach.  Multiple family members at bedside.  CORIE Camp aware.

## 2017-10-13 ENCOUNTER — TRANSCRIPTION ENCOUNTER (OUTPATIENT)
Age: 77
End: 2017-10-13

## 2017-10-13 VITALS — DIASTOLIC BLOOD PRESSURE: 59 MMHG | SYSTOLIC BLOOD PRESSURE: 134 MMHG

## 2017-10-13 LAB
BASOPHILS # BLD AUTO: 0.01 K/UL — SIGNIFICANT CHANGE UP (ref 0–0.2)
BASOPHILS NFR BLD AUTO: 0.1 % — SIGNIFICANT CHANGE UP (ref 0–2)
BUN SERPL-MCNC: 32 MG/DL — HIGH (ref 7–23)
CALCIUM SERPL-MCNC: 9 MG/DL — SIGNIFICANT CHANGE UP (ref 8.4–10.5)
CHLORIDE SERPL-SCNC: 107 MMOL/L — SIGNIFICANT CHANGE UP (ref 98–107)
CO2 SERPL-SCNC: 25 MMOL/L — SIGNIFICANT CHANGE UP (ref 22–31)
CREAT SERPL-MCNC: 1.07 MG/DL — SIGNIFICANT CHANGE UP (ref 0.5–1.3)
EOSINOPHIL # BLD AUTO: 0 K/UL — SIGNIFICANT CHANGE UP (ref 0–0.5)
EOSINOPHIL NFR BLD AUTO: 0 % — SIGNIFICANT CHANGE UP (ref 0–6)
GLUCOSE BLDC GLUCOMTR-MCNC: 159 MG/DL — HIGH (ref 70–99)
GLUCOSE BLDC GLUCOMTR-MCNC: 177 MG/DL — HIGH (ref 70–99)
GLUCOSE BLDC GLUCOMTR-MCNC: 99 MG/DL — SIGNIFICANT CHANGE UP (ref 70–99)
GLUCOSE SERPL-MCNC: 146 MG/DL — HIGH (ref 70–99)
HCT VFR BLD CALC: 41.8 % — SIGNIFICANT CHANGE UP (ref 34.5–45)
HGB BLD-MCNC: 13.6 G/DL — SIGNIFICANT CHANGE UP (ref 11.5–15.5)
IMM GRANULOCYTES # BLD AUTO: 0.09 # — SIGNIFICANT CHANGE UP
IMM GRANULOCYTES NFR BLD AUTO: 0.8 % — SIGNIFICANT CHANGE UP (ref 0–1.5)
LYMPHOCYTES # BLD AUTO: 1.52 K/UL — SIGNIFICANT CHANGE UP (ref 1–3.3)
LYMPHOCYTES # BLD AUTO: 14.3 % — SIGNIFICANT CHANGE UP (ref 13–44)
MCHC RBC-ENTMCNC: 31.2 PG — SIGNIFICANT CHANGE UP (ref 27–34)
MCHC RBC-ENTMCNC: 32.5 % — SIGNIFICANT CHANGE UP (ref 32–36)
MCV RBC AUTO: 95.9 FL — SIGNIFICANT CHANGE UP (ref 80–100)
MONOCYTES # BLD AUTO: 0.51 K/UL — SIGNIFICANT CHANGE UP (ref 0–0.9)
MONOCYTES NFR BLD AUTO: 4.8 % — SIGNIFICANT CHANGE UP (ref 2–14)
NEUTROPHILS # BLD AUTO: 8.51 K/UL — HIGH (ref 1.8–7.4)
NEUTROPHILS NFR BLD AUTO: 80 % — HIGH (ref 43–77)
NRBC # FLD: 0 — SIGNIFICANT CHANGE UP
PLATELET # BLD AUTO: 148 K/UL — LOW (ref 150–400)
PMV BLD: 11.7 FL — SIGNIFICANT CHANGE UP (ref 7–13)
POTASSIUM SERPL-MCNC: 4.5 MMOL/L — SIGNIFICANT CHANGE UP (ref 3.5–5.3)
POTASSIUM SERPL-SCNC: 4.5 MMOL/L — SIGNIFICANT CHANGE UP (ref 3.5–5.3)
RBC # BLD: 4.36 M/UL — SIGNIFICANT CHANGE UP (ref 3.8–5.2)
RBC # FLD: 14.1 % — SIGNIFICANT CHANGE UP (ref 10.3–14.5)
SODIUM SERPL-SCNC: 146 MMOL/L — HIGH (ref 135–145)
WBC # BLD: 10.64 K/UL — HIGH (ref 3.8–10.5)
WBC # FLD AUTO: 10.64 K/UL — HIGH (ref 3.8–10.5)

## 2017-10-13 RX ORDER — PANTOPRAZOLE SODIUM 20 MG/1
1 TABLET, DELAYED RELEASE ORAL
Qty: 30 | Refills: 0
Start: 2017-10-13 | End: 2017-11-12

## 2017-10-13 RX ADMIN — Medication 1: at 12:53

## 2017-10-13 RX ADMIN — HEPARIN SODIUM 5000 UNIT(S): 5000 INJECTION INTRAVENOUS; SUBCUTANEOUS at 05:04

## 2017-10-13 RX ADMIN — Medication 3 MILLILITER(S): at 15:53

## 2017-10-13 RX ADMIN — Medication 1: at 08:55

## 2017-10-13 RX ADMIN — Medication 3 MILLILITER(S): at 04:04

## 2017-10-13 RX ADMIN — Medication 3 MILLILITER(S): at 09:51

## 2017-10-13 RX ADMIN — Medication 40 MILLIGRAM(S): at 05:04

## 2017-10-13 RX ADMIN — PANTOPRAZOLE SODIUM 40 MILLIGRAM(S): 20 TABLET, DELAYED RELEASE ORAL at 05:04

## 2017-10-13 NOTE — PROGRESS NOTE ADULT - ASSESSMENT
77F PMH COPD on 3L home O2 p/w 1 week gradually worsening congestion and sob, particularly worse over the past 24 hours. Has non productive cough. . No recent travel. Feels like her typical COPD exacerbations. No known sick contacts. SOB worse with exertion. No leg pain/swelling.     Problem/Plan - 1:  ·  Problem: Respiratory failure with hypoxia and hypercapnia, unspecified chronicity.  Plan: S/P BIPAP ..Doing very well so ON NC Oxygen. CTA ;;< from: CT Angio Chest w/ IV Cont (10.10.17 @ 16:32) >  IMPRESSION: No pulmonary embolus.  New 7 mm right upper lobe nodule. Primary lung malignancy is a   consideration.    Pulmonary help appreciated      Problem/Plan - 2:  ·  Problem: COPD exacerbation.  Plan: On  IV Solumedrol and neb Rx .Improving  . Will change to po on Dc.      Problem/Plan - 3:  ·  Problem: GERD (gastroesophageal reflux disease).  Plan: PPI.      Problem/Plan - 4:  ·  Problem: Lung Nodule and H/o Smoking .  Plan: Repeat CT chest in 3  months . Pulmonary f/u. D/W daughter .
77F PMH COPD on 3L home O2 p/w 1 week gradually worsening congestion and sob, particularly worse over the past 24 hours. Has non productive cough. . No recent travel. Feels like her typical COPD exacerbations. No known sick contacts. SOB worse with exertion. No leg pain/swelling.     Problem/Plan - 1:  ·  Problem: Respiratory failure with hypoxia and hypercapnia, unspecified chronicity.  Plan: S/P BIPAP ..Doing very well so ON NC Oxygen. CTA ;;< from: CT Angio Chest w/ IV Cont (10.10.17 @ 16:32) >  IMPRESSION: No pulmonary embolus.  New 7 mm right upper lobe nodule. Primary lung malignancy is a   consideration.    Pulmonary helping.     Problem/Plan - 2:  ·  Problem: COPD exacerbation.  Plan: On  IV Solumedrol and neb Rx .Improving  . Will change to po tomorrow.     Problem/Plan - 3:  ·  Problem: GERD (gastroesophageal reflux disease).  Plan: PPI.      Problem/Plan - 4:  ·  Problem: Lung Nodule and H/o Smoking .  Plan: Repeat CT chest in 3  months . Pulmonary f/u. D/W daughter .
77F PMH COPD on 3L home O2 p/w 1 week gradually worsening congestion and sob, particularly worse over the past 24 hours. Has non productive cough. . No recent travel. Feels like her typical COPD exacerbations. No known sick contacts. SOB worse with exertion. No leg pain/swelling.     Problem/Plan - 1:  ·  Problem: Respiratory failure with hypoxia and hypercapnia, unspecified chronicity.  Plan: S/P BIPAP ..Doing very well so ON NC Pt is on home Oxygen .  Pulmonary helping.     Problem/Plan - 2:  ·  Problem: COPD exacerbation.  Plan: On  IV Solumedrol and neb Rx .Improving  . Changing to Po prednisone per Pulmonary .     Problem/Plan - 3:  ·  Problem: GERD (gastroesophageal reflux disease).  Plan: PPI.      Problem/Plan - 4:  ·  Problem: Lung Nodule and H/o Smoking .  Plan: Repeat CT chest in 3  months as concern about  malignancy  . Pulmonary f/u.
COPD exacerbation    --On SM 40 q 12 and bronchodilators.     - Can add Mucomyst to albuterol neb treatments    - If improved can consider tapering steroids in next day or so;    Chronic hypoxemia - Notes COPE, wlalking to bathroom (wasn't wearing O2)  --can try to lengthen tubing or provide port O2 for this purpose    New Nodule - again discussed with family, dtr, son; plan is to f/u with their pulm dr for short term f/u chest CT after discharge
COPD Exacerbation -  --significantly improved  --No further need for BIPAP  --Can change to po prednisone; can start at 40 mg today (in addition to AM dose of Solumedrol); On 10/14, can use prednisone 40 mg per day x 2 days;  Every two days to reduce amount by 5 mg (eg 40, 40, 35,35, 30, 30,...etc);  --Pt to f/u with Dr. Rose (He spoke with pt's dtr, who was at bedside this morning;  they are aware of the chest CT findings)  --Pt tentatively for discharge later today if remains stable.  (Dtr to bring in her portable O2 for transit home)  --Encouraged to use O2 fidencio when doing walking or other exertional activities

## 2017-10-13 NOTE — DISCHARGE NOTE ADULT - PLAN OF CARE
Shortness of breath decreased Continue Steroid taper as prescribed, use oxygen at home, continue with nebulizer treatments.  Follow up with your pulmonologist within 1 week of discharge from hospital. Follow up for repeat Cat Scan of chest within 3 months to monitor lung nodule.  Follow up with Pulmonologist for further medical management.

## 2017-10-13 NOTE — PROGRESS NOTE ADULT - SUBJECTIVE AND OBJECTIVE BOX
INTERVAL HPI/OVERNIGHT EVENTS: Feel better . NC Oxygen and daughter by bedside.   Vital Signs Last 24 Hrs  T(C): 36.7 (11 Oct 2017 05:44), Max: 36.8 (10 Oct 2017 21:18)  T(F): 98.1 (11 Oct 2017 05:44), Max: 98.3 (10 Oct 2017 21:18)  HR: 76 (11 Oct 2017 07:34) (71 - 91)  BP: 128/63 (11 Oct 2017 05:44) (126/64 - 151/77)  BP(mean): --  RR: 18 (11 Oct 2017 05:44) (16 - 20)  SpO2: 96% (11 Oct 2017 07:34) (94% - 97%)  I&O's Summary    MEDICATIONS  (STANDING):  ALBUTerol/ipratropium for Nebulization 3 milliLiter(s) Nebulizer every 6 hours  heparin  Injectable 5000 Unit(s) SubCutaneous every 12 hours  methylPREDNISolone sodium succinate Injectable 40 milliGRAM(s) IV Push every 6 hours  pantoprazole    Tablet 40 milliGRAM(s) Oral before breakfast  sodium chloride 0.9%. 1000 milliLiter(s) (50 mL/Hr) IV Continuous <Continuous>    MEDICATIONS  (PRN):  oxyCODONE    5 mG/acetaminophen 325 mG 1 Tablet(s) Oral every 4 hours PRN Moderate Pain (4 - 6)    LABS:                        14.2   12.27 )-----------( 194      ( 11 Oct 2017 07:32 )             44.8     10-10    147<H>  |  107  |  25<H>  ----------------------------<  81  5.0   |  27  |  1.31<H>    Ca    9.9      10 Oct 2017 00:00    TPro  7.3  /  Alb  3.9  /  TBili  0.3  /  DBili  x   /  AST  31  /  ALT  25  /  AlkPhos  137<H>  10-10                    REVIEW OF SYSTEMS:  CONSTITUTIONAL: No fever, weight loss, or fatigue  EYES: No eye pain, visual disturbances, or discharge  ENMT:  No difficulty hearing, tinnitus, vertigo; No sinus or throat pain  NECK: No pain or stiffness  BREASTS: No pain, masses, or nipple discharge  RESPIRATORY: No cough, wheezing, chills or hemoptysis; No shortness of breath  CARDIOVASCULAR: No chest pain, palpitations, dizziness, or leg swelling  GASTROINTESTINAL: No abdominal or epigastric pain. No nausea, vomiting, or hematemesis; No diarrhea or constipation. No melena or hematochezia.  GENITOURINARY: No dysuria, frequency, hematuria, or incontinence  NEUROLOGICAL: No headaches, memory loss, loss of strength, numbness, or tremors  SKIN: No itching, burning, rashes, or lesions   LYMPH NODES: No enlarged glands  ENDOCRINE: No heat or cold intolerance; No hair loss  MUSCULOSKELETAL: No joint pain or swelling; No muscle, back, or extremity pain  PSYCHIATRIC: No depression, anxiety, mood swings, or difficulty sleeping  HEME/LYMPH: No easy bruising, or bleeding gums  ALLERY AND IMMUNOLOGIC: No hives or eczema    RADIOLOGY & ADDITIONAL TESTS:    Consultant(s) Notes Reviewed:  [x ] YES  [ ] NO    PHYSICAL EXAM:  GENERAL: NAD, well-groomed, well-developed,not in any distress ,NC Oxygen   HEAD:  Atraumatic, Normocephalic  EYES: EOMI, PERRLA, conjunctiva and sclera clear  ENMT: No tonsillar erythema, exudates, or enlargement; Moist mucous membranes, Good dentition, No lesions  NECK: Supple, No JVD, Normal thyroid  NERVOUS SYSTEM:  Alert & Oriented X3, No focal deficit   CHEST/LUNG: Good air entry bilateral with basal  rales >left base   HEART: Regular rate and rhythm; No murmurs, rubs, or gallops  ABDOMEN: Soft, Nontender, Nondistended; Bowel sounds present  EXTREMITIES:  2+ Peripheral Pulses, No clubbing, cyanosis, or edema  SKIN: No rashes or lesions    Care Discussed with Consultants/Other Providers [ x] YES  [ ] NO
INTERVAL HPI/OVERNIGHT EVENTS: Feel better .   Vital Signs Last 24 Hrs  T(C): 36.9 (13 Oct 2017 05:02), Max: 36.9 (13 Oct 2017 05:02)  T(F): 98.4 (13 Oct 2017 05:02), Max: 98.4 (13 Oct 2017 05:02)  HR: 73 (13 Oct 2017 05:02) (73 - 85)  BP: 121/51 (13 Oct 2017 05:02) (121/51 - 138/64)  BP(mean): --  RR: 18 (13 Oct 2017 05:02) (17 - 18)  SpO2: 96% (13 Oct 2017 05:02) (94% - 96%)  I&O's Summary    MEDICATIONS  (STANDING):  ALBUTerol/ipratropium for Nebulization 3 milliLiter(s) Nebulizer every 6 hours  dextrose 5%. 1000 milliLiter(s) (50 mL/Hr) IV Continuous <Continuous>  dextrose 50% Injectable 12.5 Gram(s) IV Push once  dextrose 50% Injectable 25 Gram(s) IV Push once  dextrose 50% Injectable 25 Gram(s) IV Push once  heparin  Injectable 5000 Unit(s) SubCutaneous every 12 hours  insulin lispro (HumaLOG) corrective regimen sliding scale   SubCutaneous three times a day before meals  insulin lispro (HumaLOG) corrective regimen sliding scale   SubCutaneous at bedtime  methylPREDNISolone sodium succinate Injectable 40 milliGRAM(s) IV Push every 12 hours  pantoprazole    Tablet 40 milliGRAM(s) Oral before breakfast  sodium chloride 0.9%. 1000 milliLiter(s) (50 mL/Hr) IV Continuous <Continuous>    MEDICATIONS  (PRN):  dextrose Gel 1 Dose(s) Oral once PRN Blood Glucose LESS THAN 70 milliGRAM(s)/deciliter  glucagon  Injectable 1 milliGRAM(s) IntraMuscular once PRN Glucose LESS THAN 70 milligrams/deciliter  oxyCODONE    5 mG/acetaminophen 325 mG 1 Tablet(s) Oral every 4 hours PRN Moderate Pain (4 - 6)    LABS:                        13.6   10.64 )-----------( 148      ( 13 Oct 2017 05:33 )             41.8     10-13    146<H>  |  107  |  32<H>  ----------------------------<  146<H>  4.5   |  25  |  1.07    Ca    9.0      13 Oct 2017 05:33          CAPILLARY BLOOD GLUCOSE      POCT Blood Glucose.: 177 mg/dL (13 Oct 2017 08:45)  POCT Blood Glucose.: 175 mg/dL (12 Oct 2017 22:31)  POCT Blood Glucose.: 133 mg/dL (12 Oct 2017 16:48)  POCT Blood Glucose.: 166 mg/dL (12 Oct 2017 12:14)          REVIEW OF SYSTEMS:  CONSTITUTIONAL: No fever, weight loss, or fatigue  EYES: No eye pain, visual disturbances, or discharge  ENMT:  No difficulty hearing, tinnitus, vertigo; No sinus or throat pain  NECK: No pain or stiffness  BREASTS: No pain, masses, or nipple discharge  RESPIRATORY: No cough, wheezing, chills or hemoptysis; No shortness of breath  CARDIOVASCULAR: No chest pain, palpitations, dizziness, or leg swelling  GASTROINTESTINAL: No abdominal or epigastric pain. No nausea, vomiting, or hematemesis; No diarrhea or constipation. No melena or hematochezia.  GENITOURINARY: No dysuria, frequency, hematuria, or incontinence  NEUROLOGICAL: No headaches, memory loss, loss of strength, numbness, or tremors  SKIN: No itching, burning, rashes, or lesions   LYMPH NODES: No enlarged glands  ENDOCRINE: No heat or cold intolerance; No hair loss  MUSCULOSKELETAL: No joint pain or swelling; No muscle, back, or extremity pain  PSYCHIATRIC: No depression, anxiety, mood swings, or difficulty sleeping  HEME/LYMPH: No easy bruising, or bleeding gums  ALLERY AND IMMUNOLOGIC: No hives or eczema    RADIOLOGY & ADDITIONAL TESTS:    Consultant(s) Notes Reviewed:  [x ] YES  [ ] NO    PHYSICAL EXAM:  GENERAL: NAD, well-groomed, well-developed, not in any distress , ON NC oxygen   HEAD:  Atraumatic, Normocephalic  EYES: EOMI, PERRLA, conjunctiva and sclera clear  ENMT: No tonsillar erythema, exudates, or enlargement; Moist mucous membranes, Good dentition, No lesions  NECK: Supple, No JVD, Normal thyroid  NERVOUS SYSTEM:  Alert & Oriented X3, No focal deficit   CHEST/LUNG: Good air entry bilateral with no  rales, rhonchi, wheezing, or rubs  HEART: Regular rate and rhythm; No murmurs, rubs, or gallops  ABDOMEN: Soft, Nontender, Nondistended; Bowel sounds present  EXTREMITIES:  2+ Peripheral Pulses, No clubbing, cyanosis, or edema  SKIN: No rashes or lesions    Care Discussed with Consultants/Other Providers [ x] YES  [ ] NO
INTERVAL HPI/OVERNIGHT EVENTS: Feel better.  Vital Signs Last 24 Hrs  T(C): 36.6 (12 Oct 2017 20:23), Max: 36.7 (12 Oct 2017 05:07)  T(F): 97.9 (12 Oct 2017 20:23), Max: 98.1 (12 Oct 2017 05:07)  HR: 76 (12 Oct 2017 21:02) (72 - 85)  BP: 138/64 (12 Oct 2017 20:23) (121/68 - 138/64)  BP(mean): --  RR: 18 (12 Oct 2017 20:23) (16 - 18)  SpO2: 95% (12 Oct 2017 21:02) (93% - 96%)  I&O's Summary    MEDICATIONS  (STANDING):  ALBUTerol/ipratropium for Nebulization 3 milliLiter(s) Nebulizer every 6 hours  dextrose 5%. 1000 milliLiter(s) (50 mL/Hr) IV Continuous <Continuous>  dextrose 50% Injectable 12.5 Gram(s) IV Push once  dextrose 50% Injectable 25 Gram(s) IV Push once  dextrose 50% Injectable 25 Gram(s) IV Push once  heparin  Injectable 5000 Unit(s) SubCutaneous every 12 hours  insulin lispro (HumaLOG) corrective regimen sliding scale   SubCutaneous three times a day before meals  insulin lispro (HumaLOG) corrective regimen sliding scale   SubCutaneous at bedtime  methylPREDNISolone sodium succinate Injectable 40 milliGRAM(s) IV Push every 12 hours  pantoprazole    Tablet 40 milliGRAM(s) Oral before breakfast  sodium chloride 0.9%. 1000 milliLiter(s) (50 mL/Hr) IV Continuous <Continuous>    MEDICATIONS  (PRN):  dextrose Gel 1 Dose(s) Oral once PRN Blood Glucose LESS THAN 70 milliGRAM(s)/deciliter  glucagon  Injectable 1 milliGRAM(s) IntraMuscular once PRN Glucose LESS THAN 70 milligrams/deciliter  oxyCODONE    5 mG/acetaminophen 325 mG 1 Tablet(s) Oral every 4 hours PRN Moderate Pain (4 - 6)    LABS:                        13.4   12.94 )-----------( 170      ( 12 Oct 2017 05:51 )             41.5     10-12    142  |  106  |  31<H>  ----------------------------<  147<H>  4.4   |  24  |  1.08    Ca    9.1      12 Oct 2017 05:51  Phos  3.3     10-11  Mg     1.9     10-11    TPro  6.7  /  Alb  3.8  /  TBili  0.3  /  DBili  x   /  AST  19  /  ALT  19  /  AlkPhos  127<H>  10-11        CAPILLARY BLOOD GLUCOSE      POCT Blood Glucose.: 133 mg/dL (12 Oct 2017 16:48)  POCT Blood Glucose.: 166 mg/dL (12 Oct 2017 12:14)  POCT Blood Glucose.: 131 mg/dL (12 Oct 2017 09:20)  POCT Blood Glucose.: 135 mg/dL (11 Oct 2017 22:21)          REVIEW OF SYSTEMS:  CONSTITUTIONAL: No fever, weight loss, or fatigue  EYES: No eye pain, visual disturbances, or discharge  ENMT:  No difficulty hearing, tinnitus, vertigo; No sinus or throat pain  NECK: No pain or stiffness  BREASTS: No pain, masses, or nipple discharge  RESPIRATORY: No cough, wheezing, chills or hemoptysis; No shortness of breath  CARDIOVASCULAR: No chest pain, palpitations, dizziness, or leg swelling  GASTROINTESTINAL: No abdominal or epigastric pain. No nausea, vomiting, or hematemesis; No diarrhea or constipation. No melena or hematochezia.  GENITOURINARY: No dysuria, frequency, hematuria, or incontinence  NEUROLOGICAL: No headaches, memory loss, loss of strength, numbness, or tremors  SKIN: No itching, burning, rashes, or lesions   LYMPH NODES: No enlarged glands  ENDOCRINE: No heat or cold intolerance; No hair loss  MUSCULOSKELETAL: No joint pain or swelling; No muscle, back, or extremity pain  PSYCHIATRIC: No depression, anxiety, mood swings, or difficulty sleeping  HEME/LYMPH: No easy bruising, or bleeding gums  ALLERY AND IMMUNOLOGIC: No hives or eczema    RADIOLOGY & ADDITIONAL TESTS:    Consultant(s) Notes Reviewed:  [x ] YES  [ ] NO    PHYSICAL EXAM:  GENERAL: NAD, well-groomed, well-developed,not in any distress ,  HEAD:  Atraumatic, Normocephalic  EYES: EOMI, PERRLA, conjunctiva and sclera clear  ENMT: No tonsillar erythema, exudates, or enlargement; Moist mucous membranes, Good dentition, No lesions  NECK: Supple, No JVD, Normal thyroid  NERVOUS SYSTEM:  Alert & Oriented X3, No focal deficit   CHEST/LUNG: Good air entry bilateral with few   rhonchi,  HEART: Regular rate and rhythm; No murmurs, rubs, or gallops  ABDOMEN: Soft, Nontender, Nondistended; Bowel sounds present  EXTREMITIES:  2+ Peripheral Pulses, No clubbing, cyanosis, or edema  SKIN: No rashes or lesions    Care Discussed with Consultants/Other Providers [ x] YES  [ ] NO
awake, alert comfortable.  Some difficulty bringing up sputum      MEDICATIONS  (STANDING):  ALBUTerol/ipratropium for Nebulization 3 milliLiter(s) Nebulizer every 6 hours  dextrose 5%. 1000 milliLiter(s) (50 mL/Hr) IV Continuous <Continuous>  dextrose 50% Injectable 12.5 Gram(s) IV Push once  dextrose 50% Injectable 25 Gram(s) IV Push once  dextrose 50% Injectable 25 Gram(s) IV Push once  heparin  Injectable 5000 Unit(s) SubCutaneous every 12 hours  insulin lispro (HumaLOG) corrective regimen sliding scale   SubCutaneous three times a day before meals  insulin lispro (HumaLOG) corrective regimen sliding scale   SubCutaneous at bedtime  methylPREDNISolone sodium succinate Injectable 40 milliGRAM(s) IV Push every 12 hours  pantoprazole    Tablet 40 milliGRAM(s) Oral before breakfast  sodium chloride 0.9%. 1000 milliLiter(s) (50 mL/Hr) IV Continuous <Continuous>      MEDICATIONS  (PRN):  dextrose Gel 1 Dose(s) Oral once PRN Blood Glucose LESS THAN 70 milliGRAM(s)/deciliter  glucagon  Injectable 1 milliGRAM(s) IntraMuscular once PRN Glucose LESS THAN 70 milligrams/deciliter  oxyCODONE    5 mG/acetaminophen 325 mG 1 Tablet(s) Oral every 4 hours PRN Moderate Pain (4 - 6)          Vital Signs Last 24 Hrs  T(C): 36.7 (12 Oct 2017 13:02), Max: 36.8 (11 Oct 2017 20:15)  T(F): 98 (12 Oct 2017 13:02), Max: 98.3 (11 Oct 2017 20:15)  HR: 84 (12 Oct 2017 18:09) (72 - 85)  BP: 121/68 (12 Oct 2017 18:09) (121/68 - 137/60)  BP(mean): --  RR: 18 (12 Oct 2017 18:09) (16 - 18)  SpO2: 96% (12 Oct 2017 18:09) (93% - 96%)    PHYSICAL EXAMINATION:    GENERAL: The patient is awake and alert in no apparent distress.      Mucous membranes are moist.    NECK: Supple.    LUNGS: Bilateral BS; few dry rales left base; decrease with cough    HEART: Regular rate and rhythm without murmur.    ABDOMEN: Soft, nontender, and nondistended.      EXTREMITIES: Without  edema.          LABS:                        13.4   12.94 )-----------( 170      ( 12 Oct 2017 05:51 )             41.5     10-12    142  |  106  |  31<H>  ----------------------------<  147<H>  4.4   |  24  |  1.08    Ca    9.1      12 Oct 2017 05:51  Phos  3.3     10-11  Mg     1.9     10-11    TPro  6.7  /  Alb  3.8  /  TBili  0.3  /  DBili  x   /  AST  19  /  ALT  19  /  AlkPhos  127<H>  10-11                        MICROBIOLOGY:      RADIOLOGY & ADDITIONAL STUDIES:
PULM/MED PROGRESS NOTE:  awake, alert comfortable  On NC O2       MEDICATIONS  (STANDING):  ALBUTerol/ipratropium for Nebulization 3 milliLiter(s) Nebulizer every 6 hours  dextrose 5%. 1000 milliLiter(s) (50 mL/Hr) IV Continuous <Continuous>  dextrose 50% Injectable 12.5 Gram(s) IV Push once  dextrose 50% Injectable 25 Gram(s) IV Push once  dextrose 50% Injectable 25 Gram(s) IV Push once  heparin  Injectable 5000 Unit(s) SubCutaneous every 12 hours  insulin lispro (HumaLOG) corrective regimen sliding scale   SubCutaneous three times a day before meals  insulin lispro (HumaLOG) corrective regimen sliding scale   SubCutaneous at bedtime  methylPREDNISolone sodium succinate Injectable 40 milliGRAM(s) IV Push every 12 hours  pantoprazole    Tablet 40 milliGRAM(s) Oral before breakfast  sodium chloride 0.9%. 1000 milliLiter(s) (50 mL/Hr) IV Continuous <Continuous>      MEDICATIONS  (PRN):  dextrose Gel 1 Dose(s) Oral once PRN Blood Glucose LESS THAN 70 milliGRAM(s)/deciliter  glucagon  Injectable 1 milliGRAM(s) IntraMuscular once PRN Glucose LESS THAN 70 milligrams/deciliter  oxyCODONE    5 mG/acetaminophen 325 mG 1 Tablet(s) Oral every 4 hours PRN Moderate Pain (4 - 6)          Vital Signs Last 24 Hrs  T(C): 36.9 (13 Oct 2017 05:02), Max: 36.9 (13 Oct 2017 05:02)  T(F): 98.4 (13 Oct 2017 05:02), Max: 98.4 (13 Oct 2017 05:02)  HR: 73 (13 Oct 2017 05:02) (73 - 85)  BP: 121/51 (13 Oct 2017 05:02) (121/51 - 138/64)  BP(mean): --  RR: 18 (13 Oct 2017 05:02) (17 - 18)  SpO2: 96% (13 Oct 2017 05:02) (94% - 96%)    PHYSICAL EXAMINATION:    GENERAL: The patient is awake and alert in no apparent distress.     HEENT: No oral thrush    NECK: Supple.    LUNGS: Bilateral BS; moving air well; No  wheezing; respirations unlabored    HEART: Regular rate and rhythm without murmur.    ABDOMEN: Soft, nontender, and nondistended.      EXTREMITIES: Without edema.          LABS:                        13.6   10.64 )-----------( 148      ( 13 Oct 2017 05:33 )             41.8     10-13    146<H>  |  107  |  32<H>  ----------------------------<  146<H>  4.5   |  25  |  1.07    Ca    9.0      13 Oct 2017 05:33
PULM/MED PROGRESS NOTE:  awake, alert comfortable, on nasal O2 at 3 l/m  Family at bedside  Denies SOB, cough, wheezing, chest pains      MEDICATIONS  (STANDING):  ALBUTerol/ipratropium for Nebulization 3 milliLiter(s) Nebulizer every 6 hours  dextrose 5%. 1000 milliLiter(s) (50 mL/Hr) IV Continuous <Continuous>  dextrose 50% Injectable 12.5 Gram(s) IV Push once  dextrose 50% Injectable 25 Gram(s) IV Push once  dextrose 50% Injectable 25 Gram(s) IV Push once  heparin  Injectable 5000 Unit(s) SubCutaneous every 12 hours  insulin lispro (HumaLOG) corrective regimen sliding scale   SubCutaneous three times a day before meals  insulin lispro (HumaLOG) corrective regimen sliding scale   SubCutaneous at bedtime  methylPREDNISolone sodium succinate Injectable 40 milliGRAM(s) IV Push every 6 hours  pantoprazole    Tablet 40 milliGRAM(s) Oral before breakfast  sodium chloride 0.9%. 1000 milliLiter(s) (50 mL/Hr) IV Continuous <Continuous>      MEDICATIONS  (PRN):  dextrose Gel 1 Dose(s) Oral once PRN Blood Glucose LESS THAN 70 milliGRAM(s)/deciliter  glucagon  Injectable 1 milliGRAM(s) IntraMuscular once PRN Glucose LESS THAN 70 milligrams/deciliter  oxyCODONE    5 mG/acetaminophen 325 mG 1 Tablet(s) Oral every 4 hours PRN Moderate Pain (4 - 6)          Vital Signs Last 24 Hrs  T(C): 36.8 (11 Oct 2017 20:15), Max: 36.9 (11 Oct 2017 13:09)  T(F): 98.3 (11 Oct 2017 20:15), Max: 98.5 (11 Oct 2017 13:09)  HR: 78 (11 Oct 2017 20:15) (66 - 91)  BP: 137/60 (11 Oct 2017 20:15) (125/59 - 137/60)  BP(mean): --  RR: 18 (11 Oct 2017 20:15) (16 - 18)  SpO2: 94% (11 Oct 2017 20:15) (92% - 99%)    PHYSICAL EXAMINATION:    GENERAL: The patient is awake and alert in no apparent distress.     HEENT: Mucous membranes are moist.  No oral thrush    NECK: Supple.    LUNGS: Bilateral breath sounds; no wheezing; respirations unlabored    HEART: Regular rate and rhythm without murmur.    ABDOMEN: Soft, nontender, and nondistended.      EXTREMITIES: Without edema.          LABS:                        14.2   12.27 )-----------( 194      ( 11 Oct 2017 07:32 )             44.8     10-11    143  |  105  |  26<H>  ----------------------------<  209<H>  4.3   |  23  |  0.95    Ca    9.4      11 Oct 2017 07:32  Phos  3.3     10-11  Mg     1.9     10-11    TPro  6.7  /  Alb  3.8  /  TBili  0.3  /  DBili  x   /  AST  19  /  ALT  19  /  AlkPhos  127<H>  10-11            RADIOLOGY & ADDITIONAL STUDIES:  Chest CT:  New RUL 7 mm lung nodule

## 2017-10-13 NOTE — DISCHARGE NOTE ADULT - PATIENT PORTAL LINK FT
“You can access the [facility] Patient Portal, offered by [facility], by registering with the following website: [email or web address]”

## 2017-10-13 NOTE — DISCHARGE NOTE ADULT - HOSPITAL COURSE
77F with COPD exacerbation with evidence of increased work of breathing though not respiratory distress. Started on BIPAP (hx of Bipap use).RVP neg, CXR clear    Hospital Course:     Respiratory failure with hypoxia and hypercapnia, unspecified chronicity.  Plan: S/P BIPAP ..Doing very well so will dc BIPAP and start patient on NC 3L . Will check ABG in an hour. Pt on home oxygen 2lNC.     COPD exacerbation.  Plan: S/P IV Solumedrol and neb Rx so will continue Neb and Iv solmedrol for now . Will change to po on Dc.     : GERD (gastroesophageal reflux disease).  Plan: PPI.        Raynauds disease.  Plan: Asymptomatic.     Dispo- Stable for home with Steroid taper.  Follow up with Pulmonologist within 1 week of discharge from hospital.    Follow up for repeat CT Chest for lung nodule within 3 months.  Home with home care for PT. Héctor ordered for home use.

## 2017-10-13 NOTE — DISCHARGE NOTE ADULT - CARE PROVIDER_API CALL
Arturo Rose (DO), Critical Care Medicine; Internal Medicine; Pulmonary Disease  [address] [place] [address]  Phone: [phone]  Fax: [phone]

## 2017-10-13 NOTE — DISCHARGE NOTE ADULT - MEDICATION SUMMARY - MEDICATIONS TO TAKE
I will START or STAY ON the medications listed below when I get home from the hospital:    Héctor Malone  -- Unsteady gait  -- Indication: For unsteady gait    predniSONE 10 mg oral tablet  -- 4 tabs po x 2 days, 3 tabs po x 2 days, 1 tab po x 2 days, 0.5 tab po x 2 days  take as directed  -- It is very important that you take or use this exactly as directed.  Do not skip doses or discontinue unless directed by your doctor.  Obtain medical advice before taking any non-prescription drugs as some may affect the action of this medication.  Take with food or milk.    -- Indication: For COPD exacerbation    Percocet 5/325 325 mg-5 mg oral tablet  -- 1 tab(s) by mouth every 4 hours, As Needed MDD:8 tabs  -- Caution federal law prohibits the transfer of this drug to any person other  than the person for whom it was prescribed.  May cause drowsiness.  Alcohol may intensify this effect.  Use care when operating dangerous machinery.  This prescription cannot be refilled.  This product contains acetaminophen.  Do not use  with any other product containing acetaminophen to prevent possible liver damage.  Using more of this medication than prescribed may cause serious breathing problems.    -- Indication: For Pain managment    Symbicort 160 mcg-4.5 mcg/inh inhalation aerosol  -- 2 puff(s) inhaled 2 times a day  -- Indication: For Chronic obstructive pulmonary disease with acute exacerbation    ProAir HFA 90 mcg/inh inhalation aerosol  -- 2 puff(s) inhaled 4 times a day, As Needed  -- Indication: For Chronic obstructive pulmonary disease with acute exacerbation    pantoprazole 40 mg oral delayed release tablet  -- 1 tab(s) by mouth once a day (before a meal)  -- Indication: For GERD (gastroesophageal reflux disease)

## 2017-10-13 NOTE — DISCHARGE NOTE ADULT - CARE PLAN
Principal Discharge DX:	COPD exacerbation  Goal:	Shortness of breath decreased  Instructions for follow-up, activity and diet:	Continue Steroid taper as prescribed, use oxygen at home, continue with nebulizer treatments.  Follow up with your pulmonologist within 1 week of discharge from hospital.  Secondary Diagnosis:	Pulmonary nodule, right  Instructions for follow-up, activity and diet:	Follow up for repeat Cat Scan of chest within 3 months to monitor lung nodule.  Follow up with Pulmonologist for further medical management.

## 2018-06-14 ENCOUNTER — OUTPATIENT (OUTPATIENT)
Dept: OUTPATIENT SERVICES | Facility: HOSPITAL | Age: 78
LOS: 1 days | End: 2018-06-14
Payer: MEDICARE

## 2018-06-14 ENCOUNTER — APPOINTMENT (OUTPATIENT)
Dept: CT IMAGING | Facility: IMAGING CENTER | Age: 78
End: 2018-06-14
Payer: MEDICARE

## 2018-06-14 DIAGNOSIS — Z00.8 ENCOUNTER FOR OTHER GENERAL EXAMINATION: ICD-10-CM

## 2018-06-14 PROCEDURE — 82565 ASSAY OF CREATININE: CPT

## 2018-06-14 PROCEDURE — 74177 CT ABD & PELVIS W/CONTRAST: CPT | Mod: 26

## 2018-06-14 PROCEDURE — 71260 CT THORAX DX C+: CPT | Mod: 26

## 2018-06-14 PROCEDURE — 74177 CT ABD & PELVIS W/CONTRAST: CPT

## 2018-06-14 PROCEDURE — 71260 CT THORAX DX C+: CPT

## 2018-06-22 ENCOUNTER — APPOINTMENT (OUTPATIENT)
Dept: OPHTHALMOLOGY | Facility: CLINIC | Age: 78
End: 2018-06-22
Payer: MEDICARE

## 2018-06-22 DIAGNOSIS — H04.123 DRY EYE SYNDROME OF BILATERAL LACRIMAL GLANDS: ICD-10-CM

## 2018-06-22 DIAGNOSIS — H35.373 PUCKERING OF MACULA, BILATERAL: ICD-10-CM

## 2018-06-22 PROCEDURE — 92012 INTRM OPH EXAM EST PATIENT: CPT | Mod: 25

## 2018-06-22 PROCEDURE — 68761 CLOSE TEAR DUCT OPENING: CPT | Mod: E2,E4

## 2018-10-08 NOTE — PHYSICAL THERAPY INITIAL EVALUATION ADULT - PREDICTED DURATION OF THERAPY (DAYS/WKS), PT EVAL
----- Message from Michelle Gonzales RN sent at 4/28/2016 10:16 AM CDT -----  Regarding: recall colon  Recall colon in 5 years per PL.  Colon done 11/7/13 2 weeks

## 2018-10-25 ENCOUNTER — APPOINTMENT (OUTPATIENT)
Dept: OPHTHALMOLOGY | Facility: CLINIC | Age: 78
End: 2018-10-25

## 2019-04-30 NOTE — DISCHARGE NOTE ADULT - FUNCTIONAL SCREEN CURRENT LEVEL: AMBULATION, MLM
Render Post-Care Instructions In Note?: no Consent: The patient's consent was obtained including but not limited to risks of crusting, scabbing, blistering, scarring, darker or lighter pigmentary change, recurrence, incomplete removal and infection. The patient understands that the procedure is cosmetic in nature and is not covered by insurance. Detail Level: Detailed Post-Care Instructions: I reviewed with the patient in detail post-care instructions. Patient is to wear sunprotection, and avoid picking at any of the treated lesions. Pt may apply Vaseline to crusted or scabbing areas. Price (Use Numbers Only, No Special Characters Or $): 99 (2) assistive person

## 2019-05-15 NOTE — PROGRESS NOTE ADULT - PROBLEM/PLAN-3
DISPLAY PLAN FREE TEXT Consent: The patient's consent was obtained including but not limited to risks of crusting, scabbing, blistering, scarring, darker or lighter pigmentary change, recurrence, incomplete removal and infection. Render Note In Bullet Format When Appropriate: No Post-Care Instructions: I reviewed with the patient in detail post-care instructions. Patient is to wear sunprotection, and avoid picking at any of the treated lesions. Pt may apply Vaseline to crusted or scabbing areas. Duration Of Freeze Thaw-Cycle (Seconds): 5 Detail Level: Detailed Render Post-Care Instructions In Note?: yes

## 2019-09-04 ENCOUNTER — APPOINTMENT (OUTPATIENT)
Dept: OPHTHALMOLOGY | Facility: CLINIC | Age: 79
End: 2019-09-04
Payer: MEDICARE

## 2019-09-04 ENCOUNTER — NON-APPOINTMENT (OUTPATIENT)
Age: 79
End: 2019-09-04

## 2019-09-04 PROCEDURE — 92134 CPTRZ OPH DX IMG PST SGM RTA: CPT

## 2019-09-04 PROCEDURE — 92014 COMPRE OPH EXAM EST PT 1/>: CPT

## 2019-09-16 ENCOUNTER — OUTPATIENT (OUTPATIENT)
Dept: OUTPATIENT SERVICES | Facility: HOSPITAL | Age: 79
LOS: 1 days | End: 2019-09-16
Payer: MEDICARE

## 2019-09-16 ENCOUNTER — APPOINTMENT (OUTPATIENT)
Dept: CT IMAGING | Facility: IMAGING CENTER | Age: 79
End: 2019-09-16
Payer: MEDICARE

## 2019-09-16 DIAGNOSIS — R91.1 SOLITARY PULMONARY NODULE: ICD-10-CM

## 2019-09-16 PROCEDURE — 71250 CT THORAX DX C-: CPT

## 2019-09-16 PROCEDURE — 71250 CT THORAX DX C-: CPT | Mod: 26

## 2019-09-22 ENCOUNTER — TRANSCRIPTION ENCOUNTER (OUTPATIENT)
Age: 79
End: 2019-09-22

## 2019-09-23 ENCOUNTER — OUTPATIENT (OUTPATIENT)
Dept: OUTPATIENT SERVICES | Facility: HOSPITAL | Age: 79
LOS: 1 days | End: 2019-09-23
Payer: MEDICARE

## 2019-09-23 VITALS
TEMPERATURE: 97 F | OXYGEN SATURATION: 98 % | DIASTOLIC BLOOD PRESSURE: 54 MMHG | WEIGHT: 130.95 LBS | RESPIRATION RATE: 16 BRPM | HEART RATE: 66 BPM | SYSTOLIC BLOOD PRESSURE: 139 MMHG | HEIGHT: 62 IN

## 2019-09-23 VITALS
OXYGEN SATURATION: 93 % | RESPIRATION RATE: 18 BRPM | HEART RATE: 74 BPM | DIASTOLIC BLOOD PRESSURE: 69 MMHG | SYSTOLIC BLOOD PRESSURE: 108 MMHG

## 2019-09-23 DIAGNOSIS — Z98.49 CATARACT EXTRACTION STATUS, UNSPECIFIED EYE: Chronic | ICD-10-CM

## 2019-09-23 DIAGNOSIS — H35.341 MACULAR CYST, HOLE, OR PSEUDOHOLE, RIGHT EYE: ICD-10-CM

## 2019-09-23 PROCEDURE — 67042 VIT FOR MACULAR HOLE: CPT | Mod: RT

## 2019-09-23 PROCEDURE — C1889: CPT

## 2019-09-23 NOTE — ASU PATIENT PROFILE, ADULT - PMH
COPD (Chronic Obstructive Pulmonary Disease)    GERD (gastroesophageal reflux disease)    Raynauds disease

## 2019-09-23 NOTE — ASU DISCHARGE PLAN (ADULT/PEDIATRIC) - NURSING INSTRUCTIONS
Maintain face down positioning as instructed; May sleep on right or left side; DO NOT LAY FLAT IN BED LOOKING UPWARD  Maintain gas bubble precautions as advised   Do not rub the operative eye   Resume regular medications as per your physician's instructions  May take Tylenol (Acetaminophen ) as needed for pain as directed  Bring all eye drops to the doctor's office for your follow-up visit

## 2019-09-23 NOTE — ASU DISCHARGE PLAN (ADULT/PEDIATRIC) - CALL YOUR DOCTOR IF YOU HAVE ANY OF THE FOLLOWING:
Bleeding that does not stop/Pain not relieved by Medications/Swelling that gets worse/Fever greater than (need to indicate Fahrenheit or Celsius)/Nausea and vomiting that does not stop/Wound/Surgical Site with redness, or foul smelling discharge or pus Nausea and vomiting that does not stop/Fever greater than (need to indicate Fahrenheit or Celsius)/Pain not relieved by Medications/Swelling that gets worse/Bleeding that does not stop

## 2019-09-23 NOTE — ASU DISCHARGE PLAN (ADULT/PEDIATRIC) - PROCEDURE
Surgery of the Retina Right Eye Pars plana vitrectomy with membrane peel and SF6 gas bubble to the right eye Right Eye pars plana vitrectomy, membrane peel, SF6 gas injection

## 2019-09-23 NOTE — ASU DISCHARGE PLAN (ADULT/PEDIATRIC) - CARE PROVIDER_API CALL
Kermit Vasquez)  Ophthalmology  [address] [place] [id number]  Phone: [phone]  Fax: [phone]  Follow Up Time:

## 2019-09-23 NOTE — ASU PATIENT PROFILE, ADULT - PSH
History of appendectomy    S/P left cataract extraction    Status post cataract extraction  right eye in 2014

## 2019-09-27 ENCOUNTER — APPOINTMENT (OUTPATIENT)
Dept: ULTRASOUND IMAGING | Facility: IMAGING CENTER | Age: 79
End: 2019-09-27
Payer: MEDICARE

## 2019-09-27 ENCOUNTER — OUTPATIENT (OUTPATIENT)
Dept: OUTPATIENT SERVICES | Facility: HOSPITAL | Age: 79
LOS: 1 days | End: 2019-09-27
Payer: MEDICARE

## 2019-09-27 DIAGNOSIS — Z98.49 CATARACT EXTRACTION STATUS, UNSPECIFIED EYE: Chronic | ICD-10-CM

## 2019-09-27 DIAGNOSIS — Z00.8 ENCOUNTER FOR OTHER GENERAL EXAMINATION: ICD-10-CM

## 2019-09-27 PROCEDURE — 76705 ECHO EXAM OF ABDOMEN: CPT

## 2019-09-27 PROCEDURE — 76705 ECHO EXAM OF ABDOMEN: CPT | Mod: 26

## 2019-10-23 ENCOUNTER — APPOINTMENT (OUTPATIENT)
Dept: MRI IMAGING | Facility: HOSPITAL | Age: 79
End: 2019-10-23

## 2019-10-23 ENCOUNTER — OUTPATIENT (OUTPATIENT)
Dept: OUTPATIENT SERVICES | Facility: HOSPITAL | Age: 79
LOS: 1 days | End: 2019-10-23
Payer: MEDICARE

## 2019-10-23 DIAGNOSIS — Z98.49 CATARACT EXTRACTION STATUS, UNSPECIFIED EYE: Chronic | ICD-10-CM

## 2019-10-23 DIAGNOSIS — Z00.8 ENCOUNTER FOR OTHER GENERAL EXAMINATION: ICD-10-CM

## 2019-10-23 PROCEDURE — A9579: CPT

## 2019-10-23 PROCEDURE — 74183 MRI ABD W/O CNTR FLWD CNTR: CPT | Mod: 26

## 2019-10-23 PROCEDURE — 74183 MRI ABD W/O CNTR FLWD CNTR: CPT

## 2019-10-28 ENCOUNTER — OUTPATIENT (OUTPATIENT)
Dept: OUTPATIENT SERVICES | Facility: HOSPITAL | Age: 79
LOS: 1 days | End: 2019-10-28
Payer: MEDICARE

## 2019-10-28 ENCOUNTER — APPOINTMENT (OUTPATIENT)
Dept: RADIOLOGY | Facility: IMAGING CENTER | Age: 79
End: 2019-10-28
Payer: MEDICARE

## 2019-10-28 ENCOUNTER — APPOINTMENT (OUTPATIENT)
Dept: MAMMOGRAPHY | Facility: IMAGING CENTER | Age: 79
End: 2019-10-28
Payer: MEDICARE

## 2019-10-28 DIAGNOSIS — Z98.49 CATARACT EXTRACTION STATUS, UNSPECIFIED EYE: Chronic | ICD-10-CM

## 2019-10-28 DIAGNOSIS — Z00.8 ENCOUNTER FOR OTHER GENERAL EXAMINATION: ICD-10-CM

## 2019-10-28 PROCEDURE — 77080 DXA BONE DENSITY AXIAL: CPT | Mod: 26

## 2019-10-28 PROCEDURE — 77067 SCR MAMMO BI INCL CAD: CPT

## 2019-10-28 PROCEDURE — 77063 BREAST TOMOSYNTHESIS BI: CPT

## 2019-10-28 PROCEDURE — 77067 SCR MAMMO BI INCL CAD: CPT | Mod: 26

## 2019-10-28 PROCEDURE — 77063 BREAST TOMOSYNTHESIS BI: CPT | Mod: 26

## 2019-10-28 PROCEDURE — 77080 DXA BONE DENSITY AXIAL: CPT

## 2019-11-16 ENCOUNTER — INPATIENT (INPATIENT)
Facility: HOSPITAL | Age: 79
LOS: 1 days | Discharge: HOME | DRG: 185 | End: 2019-11-18
Attending: SURGERY | Admitting: SURGERY
Payer: MEDICARE

## 2019-11-16 VITALS
WEIGHT: 130.07 LBS | SYSTOLIC BLOOD PRESSURE: 141 MMHG | DIASTOLIC BLOOD PRESSURE: 64 MMHG | TEMPERATURE: 98 F | RESPIRATION RATE: 16 BRPM | OXYGEN SATURATION: 94 % | HEART RATE: 87 BPM

## 2019-11-16 DIAGNOSIS — S22.39XA FRACTURE OF ONE RIB, UNSPECIFIED SIDE, INITIAL ENCOUNTER FOR CLOSED FRACTURE: ICD-10-CM

## 2019-11-16 DIAGNOSIS — Z98.49 CATARACT EXTRACTION STATUS, UNSPECIFIED EYE: Chronic | ICD-10-CM

## 2019-11-16 LAB
ALBUMIN SERPL ELPH-MCNC: 3.8 G/DL — SIGNIFICANT CHANGE UP (ref 3.3–5)
ALP SERPL-CCNC: 178 U/L — HIGH (ref 40–120)
ALT FLD-CCNC: 43 U/L — SIGNIFICANT CHANGE UP (ref 10–45)
ANION GAP SERPL CALC-SCNC: 15 MMOL/L — SIGNIFICANT CHANGE UP (ref 5–17)
APPEARANCE UR: CLEAR — SIGNIFICANT CHANGE UP
APTT BLD: 32.4 SEC — SIGNIFICANT CHANGE UP (ref 27.5–36.3)
AST SERPL-CCNC: 39 U/L — SIGNIFICANT CHANGE UP (ref 10–40)
BACTERIA # UR AUTO: NEGATIVE — SIGNIFICANT CHANGE UP
BASOPHILS # BLD AUTO: 0.02 K/UL — SIGNIFICANT CHANGE UP (ref 0–0.2)
BASOPHILS NFR BLD AUTO: 0.2 % — SIGNIFICANT CHANGE UP (ref 0–2)
BILIRUB SERPL-MCNC: 0.5 MG/DL — SIGNIFICANT CHANGE UP (ref 0.2–1.2)
BILIRUB UR-MCNC: NEGATIVE — SIGNIFICANT CHANGE UP
BUN SERPL-MCNC: 21 MG/DL — SIGNIFICANT CHANGE UP (ref 7–23)
CALCIUM SERPL-MCNC: 10.2 MG/DL — SIGNIFICANT CHANGE UP (ref 8.4–10.5)
CHLORIDE SERPL-SCNC: 102 MMOL/L — SIGNIFICANT CHANGE UP (ref 96–108)
CO2 SERPL-SCNC: 26 MMOL/L — SIGNIFICANT CHANGE UP (ref 22–31)
COLOR SPEC: SIGNIFICANT CHANGE UP
CREAT SERPL-MCNC: 0.99 MG/DL — SIGNIFICANT CHANGE UP (ref 0.5–1.3)
DIFF PNL FLD: NEGATIVE — SIGNIFICANT CHANGE UP
EOSINOPHIL # BLD AUTO: 0.12 K/UL — SIGNIFICANT CHANGE UP (ref 0–0.5)
EOSINOPHIL NFR BLD AUTO: 1.3 % — SIGNIFICANT CHANGE UP (ref 0–6)
EPI CELLS # UR: 0 /HPF — SIGNIFICANT CHANGE UP
GLUCOSE SERPL-MCNC: 159 MG/DL — HIGH (ref 70–99)
GLUCOSE UR QL: NEGATIVE — SIGNIFICANT CHANGE UP
HCT VFR BLD CALC: 44.3 % — SIGNIFICANT CHANGE UP (ref 34.5–45)
HGB BLD-MCNC: 14 G/DL — SIGNIFICANT CHANGE UP (ref 11.5–15.5)
HYALINE CASTS # UR AUTO: 0 /LPF — SIGNIFICANT CHANGE UP (ref 0–2)
IMM GRANULOCYTES NFR BLD AUTO: 1 % — SIGNIFICANT CHANGE UP (ref 0–1.5)
INR BLD: 1.06 RATIO — SIGNIFICANT CHANGE UP (ref 0.88–1.16)
KETONES UR-MCNC: NEGATIVE — SIGNIFICANT CHANGE UP
LEUKOCYTE ESTERASE UR-ACNC: NEGATIVE — SIGNIFICANT CHANGE UP
LYMPHOCYTES # BLD AUTO: 1.83 K/UL — SIGNIFICANT CHANGE UP (ref 1–3.3)
LYMPHOCYTES # BLD AUTO: 19.5 % — SIGNIFICANT CHANGE UP (ref 13–44)
MCHC RBC-ENTMCNC: 30.8 PG — SIGNIFICANT CHANGE UP (ref 27–34)
MCHC RBC-ENTMCNC: 31.6 GM/DL — LOW (ref 32–36)
MCV RBC AUTO: 97.4 FL — SIGNIFICANT CHANGE UP (ref 80–100)
MONOCYTES # BLD AUTO: 0.92 K/UL — HIGH (ref 0–0.9)
MONOCYTES NFR BLD AUTO: 9.8 % — SIGNIFICANT CHANGE UP (ref 2–14)
NEUTROPHILS # BLD AUTO: 6.42 K/UL — SIGNIFICANT CHANGE UP (ref 1.8–7.4)
NEUTROPHILS NFR BLD AUTO: 68.2 % — SIGNIFICANT CHANGE UP (ref 43–77)
NITRITE UR-MCNC: NEGATIVE — SIGNIFICANT CHANGE UP
NRBC # BLD: 0 /100 WBCS — SIGNIFICANT CHANGE UP (ref 0–0)
PH UR: 6 — SIGNIFICANT CHANGE UP (ref 5–8)
PLATELET # BLD AUTO: 122 K/UL — LOW (ref 150–400)
POTASSIUM SERPL-MCNC: 5.3 MMOL/L — SIGNIFICANT CHANGE UP (ref 3.5–5.3)
POTASSIUM SERPL-SCNC: 5.3 MMOL/L — SIGNIFICANT CHANGE UP (ref 3.5–5.3)
PROT SERPL-MCNC: 6.8 G/DL — SIGNIFICANT CHANGE UP (ref 6–8.3)
PROT UR-MCNC: NEGATIVE — SIGNIFICANT CHANGE UP
PROTHROM AB SERPL-ACNC: 12.2 SEC — SIGNIFICANT CHANGE UP (ref 10–12.9)
RBC # BLD: 4.55 M/UL — SIGNIFICANT CHANGE UP (ref 3.8–5.2)
RBC # FLD: 13.5 % — SIGNIFICANT CHANGE UP (ref 10.3–14.5)
RBC CASTS # UR COMP ASSIST: 2 /HPF — SIGNIFICANT CHANGE UP (ref 0–4)
SODIUM SERPL-SCNC: 143 MMOL/L — SIGNIFICANT CHANGE UP (ref 135–145)
SP GR SPEC: 1.02 — SIGNIFICANT CHANGE UP (ref 1.01–1.02)
UROBILINOGEN FLD QL: NEGATIVE — SIGNIFICANT CHANGE UP
WBC # BLD: 9.4 K/UL — SIGNIFICANT CHANGE UP (ref 3.8–10.5)
WBC # FLD AUTO: 9.4 K/UL — SIGNIFICANT CHANGE UP (ref 3.8–10.5)
WBC UR QL: 4 /HPF — SIGNIFICANT CHANGE UP (ref 0–5)

## 2019-11-16 PROCEDURE — 73552 X-RAY EXAM OF FEMUR 2/>: CPT | Mod: 26,RT

## 2019-11-16 PROCEDURE — 99222 1ST HOSP IP/OBS MODERATE 55: CPT

## 2019-11-16 PROCEDURE — 99285 EMERGENCY DEPT VISIT HI MDM: CPT

## 2019-11-16 PROCEDURE — 71045 X-RAY EXAM CHEST 1 VIEW: CPT | Mod: 26

## 2019-11-16 PROCEDURE — 73030 X-RAY EXAM OF SHOULDER: CPT | Mod: 26,RT

## 2019-11-16 PROCEDURE — 71250 CT THORAX DX C-: CPT | Mod: 26

## 2019-11-16 PROCEDURE — 73502 X-RAY EXAM HIP UNI 2-3 VIEWS: CPT | Mod: 26,RT

## 2019-11-16 RX ORDER — ACETAMINOPHEN 500 MG
975 TABLET ORAL ONCE
Refills: 0 | Status: COMPLETED | OUTPATIENT
Start: 2019-11-16 | End: 2019-11-16

## 2019-11-16 RX ORDER — MORPHINE SULFATE 50 MG/1
4 CAPSULE, EXTENDED RELEASE ORAL ONCE
Refills: 0 | Status: DISCONTINUED | OUTPATIENT
Start: 2019-11-16 | End: 2019-11-16

## 2019-11-16 RX ADMIN — MORPHINE SULFATE 4 MILLIGRAM(S): 50 CAPSULE, EXTENDED RELEASE ORAL at 22:31

## 2019-11-16 RX ADMIN — MORPHINE SULFATE 4 MILLIGRAM(S): 50 CAPSULE, EXTENDED RELEASE ORAL at 20:40

## 2019-11-16 RX ADMIN — Medication 975 MILLIGRAM(S): at 19:18

## 2019-11-16 RX ADMIN — Medication 975 MILLIGRAM(S): at 22:31

## 2019-11-16 NOTE — ED PROVIDER NOTE - CARE PLAN
Principal Discharge DX:	Fall Principal Discharge DX:	Rib contusion Principal Discharge DX:	Rib fractures

## 2019-11-16 NOTE — H&P ADULT - ASSESSMENT
ASSESSMENT:  79 yr old female s/p fall from standing, with right 7th-8th posterior rib fractures    PLAN:  - Admit to trauma  - Multimodal pain control  - Encourage use of IS, ambulation as tolerated, OOBTC daily, chest PT  - Plan discussed with trauma surgery attending    Macho Chen, PGY 2  [phone]

## 2019-11-16 NOTE — ED PROVIDER NOTE - CLINICAL SUMMARY MEDICAL DECISION MAKING FREE TEXT BOX
79 year old female with pmhx of COPD on 4L NC at home, complains of mechanical fall with right rib worse when taking a deep breath, shoulder and hip pain, denies LOC, blood thinner use, will obtain labs, Chest CT, right shoulder and hip xray, pain control with reevaluate ZR. 79 year old female with pmhx of COPD on 4L NC at home, complains of mechanical fall with right rib worse when taking a deep breath, shoulder and hip pain, denies LOC, blood thinner use,  ro fr ribs ,will obtain labs, Chest CT, right shoulder and hip xray, pain control with reevaluate ZR.

## 2019-11-16 NOTE — ED ADULT NURSE NOTE - OBJECTIVE STATEMENT
Patient is a 78 y/o female brought in by EMS for a fall. States she tried standing up out of her chair when mechanically she fell backwards and landed on her right side. States she hit her right shoulder and ribs. Denies hitting her head. Denies use of blood thinners. Denies LOC. PMH COPD. On 4L nasal cannula at baseline and receives albuterol treatment every 4 hours at baseline. States she is SOB but that she is SOB at baseline, states this is her normal. States she is supposed to use cane but does not use it. Denies CP, N/V/D, numbness, tingling, weakness, lightheadedness. A&Ox3. Lung sounds diminished and equal bilaterally, patient states this is her baseline r/t COPD. Patient is on 4L NC. Breathing unlabored and spontaneously. Abdomen soft, nontender and nondistended. Pain in shoulder when lifting right arm. Full ROM in other 3 extremities. Pain upon palpation over right ribs. No edema of extremities. Family at bedside. VSS/NAD. Safety and comfort measures provided.

## 2019-11-16 NOTE — ED PROVIDER NOTE - OBJECTIVE STATEMENT
79 year old female with pmhx of COPD on 4L NC at home at all time, pt presents to the ED today with complaints of a mechanical fall at home, pt was sitting in chair when she stood up quickly to stop her granddaughter from falling, usually walks with a cane, pt fell into a pillar at home hitting her right chest and hearing a crack in her right ribs, unsure if hit head, pt complains of right chest pain 10/10 worse when taking a deep breath and right shoulder pain with decreased ROM on shoulder flexion, denies loss of consciousness, blood thinner use, abdominal pain, N/V.

## 2019-11-16 NOTE — ED ADULT NURSE REASSESSMENT NOTE - NS ED NURSE REASSESS COMMENT FT1
Received report from day RN, patient complaining of right shoulder and right leg pain s/p mechanical trip and fall, denies cspine tenderness, full rom of upper and lower extremities, VSS, awaiting CT.

## 2019-11-16 NOTE — H&P ADULT - ATTENDING COMMENTS
Patient seen and examined and agree with above.  79 year old female with PMH significant for COPD on 4 liters NC  who presents after a fall with multiple rib fractures right 7-8. The patient is hemodynamically appropriate with approximately 1000 cc on incentive spirometer. She does not appear in respiratory distress. She denies other chest pain or abdominal pain. She does have shoulder pain.   I have reviewed PMH/PSH/labs, meds and imaging.  On physical exam her respiratory state is stable.   Right shoulder XR- negative for acute fracture or disolocation  CT chest - Acute right seventh and eighth posterior rib fractures. Minimal adjacent   pleural hematoma. No pneumothorax.    Admitted to the SICU for close monitoring of respiratory status given her COPD and already compromised respiratory state.   Will encourage continued use of incentive spirometer.  Pain control with mutlimodal therapy including tylenol,  lidoderm patch and PRN tramadol  PT to evaluate the patient.

## 2019-11-16 NOTE — ED PROVIDER NOTE - PROGRESS NOTE DETAILS
Lilo PGY-1: patient's pain significantly improved after morphine, breathing improved. Patient and surgery consult informed of rib fractures, to see patient.

## 2019-11-16 NOTE — ED ADULT NURSE NOTE - NSIMPLEMENTINTERV_GEN_ALL_ED
Implemented All Fall Risk Interventions:  Nikolai to call system. Call bell, personal items and telephone within reach. Instruct patient to call for assistance. Room bathroom lighting operational. Non-slip footwear when patient is off stretcher. Physically safe environment: no spills, clutter or unnecessary equipment. Stretcher in lowest position, wheels locked, appropriate side rails in place. Provide visual cue, wrist band, yellow gown, etc. Monitor gait and stability. Monitor for mental status changes and reorient to person, place, and time. Review medications for side effects contributing to fall risk. Reinforce activity limits and safety measures with patient and family.

## 2019-11-16 NOTE — H&P ADULT - HISTORY OF PRESENT ILLNESS
TRAUMA SERVICE (Acute Care Surgery / [facility] - #9039) - H&P  --------------------------------------------------------------------------------------------    TRAUMA ACTIVATION LEVEL:     MECHANISM OF INJURY:      [] Blunt  	[] MVC	[X] Fall	[] Pedestrian Struck	[] Motorcycle accident      [] Penetrating  	[] Gun Shot Wound 		[] Stab Wound    GCS: 15 	E: 4	V: 5	M: 6    Patient is a 79y old  Female who presents with a chief complaint of right chest wall pain    HPI:   79 yr old female presents to ED c/o right chest wall pain s/p fall this afternoon. Pt states she was standing up from a chair when she lost her balance and fell against a pillar in her home, hitting her right side, denies headstrike, -LOC. Pt complains of right shoulder, chest wall, and right hip pain. States she has not been able to ambulate since the fall due to this pain. Usually pt ambulates with a cane, but is minimally ambulatory with SOB after a few steps. Pt with a history of COPD on 4L O2 at home. Able to pull 800-1000mL on IS.     Primary Survey:    A - airway intact  B - bilateral breath sounds and good chest rise  C - initial BP: 126/62 (11-16-19 @ 23:55), HR: 78 (11-16-19 @ 23:55), palpable pulses in all extremities  D - GCS 15 on arrival  Exposure obtained      Secondary Survey:   General: NAD  HEENT: Normocephalic, atraumatic, EOMI, PEERLA.  Neck: Soft, midline trachea.  Chest: Mild right lateral chest wall tenderness.   Cardiac: S1, S2, RRR  Respiratory: Bilateral breath sounds, clear and equal bilaterally  Abdomen: Soft, non-distended, non-tender, no rebound, no guarding, no masses palpated  Ext: palp radial b/l UE, b/l DP palp in Lower Extrem, motor and sensory grossly intact in all 4 extremities  Back: no TTP, no palpable runoff/stepoff/deformity  Rectal: Refused    Patient denies fevers/chills, denies lightheadedness/dizziness, denies chest pain, denies nausea/vomiting, denies constipation/diarrhea.      ROS: 10-system review is otherwise negative except HPI above.      PAST MEDICAL & SURGICAL HISTORY:  Raynauds disease  GERD (gastroesophageal reflux disease)  COPD (Chronic Obstructive Pulmonary Disease): no intubation hx  last exacerb 2016  PRN 2 L NC  Status post cataract extraction: right eye in   S/P left cataract extraction  History of appendectomy    FAMILY HISTORY:  No pertinent family history in first degree relatives    [] Family history not pertinent as reviewed with the patient and family    SOCIAL HISTORY:    Denies EtOH  Former smoker (approx 50yrs, 1-2PPD, quit 17 yrs ago)    ALLERGIES: No Known Allergies      HOME MEDICATIONS:   ProAir HFA 90 mcg/inh inhalation aerosol: 2 puff(s) inhaled 4 times a day, As Needed (28 Dec 2016 12:33)  Symbicort 160 mcg-4.5 mcg/inh inhalation aerosol: 2 puff(s) inhaled 2 times a day (28 Dec 2016 12:33)      -------------------------------------------------------------    Vitals:   T(C): 36.7 (19 @ 23:55), Max: 36.9 (19 @ 20:40)  HR: 78 (19 @ 23:55) (72 - 87)  BP: 126/62 (19 @ 23:55) (107/55 - 141/64)  RR: 20 (19 @ 23:55) (16 - 20)  SpO2: 94% (19 @ 23:55) (93% - 96%)  CAPILLARY BLOOD GLUCOSE                  Weight (kg): 59 ( @ 17:29)    --------------------------------------------------------------------------------------------    LABS  CBC ( 18:30)                              14.0                           9.40    )----------------(  122<L>     68.2  % Neutrophils, 19.5  % Lymphocytes, ANC: 6.42                                44.3      BMP ( 18:30)             143     |  102     |  21    		Ca++ --      Ca 10.2               ---------------------------------( 159<H>		Mg --                 5.3     |  26      |  0.99  			Ph --        LFTs ( @ 18:30)      TPro 6.8 / Alb 3.8 / TBili 0.5 / DBili -- / AST 39 / ALT 43 / AlkPhos 178<H>    Coags ( 18:30)  aPTT 32.4 / INR 1.06 / PT 12.2          --------------------------------------------------------------------------------------------    MICROBIOLOGY  Urinalysis ( @ 20:54):     Color: Light Yellow / Appearance: Clear / S.016 / pH: 6.0 / Gluc: Negative / Ketones: Negative / Bili: Negative / Urobili: Negative / Protein :Negative / Nitrites: Negative / Leuk.Est: Negative / RBC: 2 / WBC: 4 / Sq Epi:  / Non Sq Epi: 0 / Bacteria Negative         --------------------------------------------------------------------------------------------    IMAGING    ******PRELIMINARY REPORT******    ******PRELIMINARY REPORT******          EXAM:  CT CHEST                            PROCEDURE DATE:  2019      ******PRELIMINARY REPORT******    ******PRELIMINARY REPORT******              INTERPRETATION:  [facility] PRELIMINARY REPORT    PROCEDURE INFORMATION:   Exam: CT Chest Without Contrast   Exam date and time: 2019 8:01 PM   Clinical history: 79 years old, female; Chest pain and other: Back;   Right-sided   chest pain     TECHNIQUE:   Imaging protocol: Computed tomography of the chest without contrast.     COMPARISON:   CT CHEST 2019 7:51 PM       IMPRESSION:   1. Acute right seventh and eighth posterior rib fractures. Minimal   adjacent   pleural hematoma.   2. Hypodense right thyroid lobe lesion.   3. Diffuse centrilobular emphysematous changes. Right basilar atelectasis   versus scarring.No acute consolidation.              ******PRELIMINARY REPORT******    ******PRELIMINARY REPORT******              MAURIZIO GARRETT M.D.;[facility]                    --------------------------------------------------------------------------------------------

## 2019-11-16 NOTE — ED ADULT NURSE REASSESSMENT NOTE - NS ED NURSE REASSESS COMMENT FT1
Patient states improvement in pain, awaiting surg consult, family remains at bedside, updated on plan of care.

## 2019-11-17 LAB
ANION GAP SERPL CALC-SCNC: 13 MMOL/L — SIGNIFICANT CHANGE UP (ref 5–17)
APTT BLD: 21.8 SEC — LOW (ref 27.5–36.3)
BUN SERPL-MCNC: 19 MG/DL — SIGNIFICANT CHANGE UP (ref 7–23)
CALCIUM SERPL-MCNC: 9.9 MG/DL — SIGNIFICANT CHANGE UP (ref 8.4–10.5)
CHLORIDE SERPL-SCNC: 105 MMOL/L — SIGNIFICANT CHANGE UP (ref 96–108)
CO2 SERPL-SCNC: 22 MMOL/L — SIGNIFICANT CHANGE UP (ref 22–31)
CREAT SERPL-MCNC: 0.87 MG/DL — SIGNIFICANT CHANGE UP (ref 0.5–1.3)
GLUCOSE SERPL-MCNC: 128 MG/DL — HIGH (ref 70–99)
HCT VFR BLD CALC: 42.7 % — SIGNIFICANT CHANGE UP (ref 34.5–45)
HGB BLD-MCNC: 13.8 G/DL — SIGNIFICANT CHANGE UP (ref 11.5–15.5)
INR BLD: 1.07 RATIO — SIGNIFICANT CHANGE UP (ref 0.88–1.16)
MAGNESIUM SERPL-MCNC: 2 MG/DL — SIGNIFICANT CHANGE UP (ref 1.6–2.6)
MCHC RBC-ENTMCNC: 31.2 PG — SIGNIFICANT CHANGE UP (ref 27–34)
MCHC RBC-ENTMCNC: 32.3 GM/DL — SIGNIFICANT CHANGE UP (ref 32–36)
MCV RBC AUTO: 96.6 FL — SIGNIFICANT CHANGE UP (ref 80–100)
NRBC # BLD: 0 /100 WBCS — SIGNIFICANT CHANGE UP (ref 0–0)
PHOSPHATE SERPL-MCNC: 2.8 MG/DL — SIGNIFICANT CHANGE UP (ref 2.5–4.5)
PLATELET # BLD AUTO: 108 K/UL — LOW (ref 150–400)
POTASSIUM SERPL-MCNC: 4.4 MMOL/L — SIGNIFICANT CHANGE UP (ref 3.5–5.3)
POTASSIUM SERPL-SCNC: 4.4 MMOL/L — SIGNIFICANT CHANGE UP (ref 3.5–5.3)
PROTHROM AB SERPL-ACNC: 12.3 SEC — SIGNIFICANT CHANGE UP (ref 10–12.9)
RBC # BLD: 4.42 M/UL — SIGNIFICANT CHANGE UP (ref 3.8–5.2)
RBC # FLD: 13.5 % — SIGNIFICANT CHANGE UP (ref 10.3–14.5)
SODIUM SERPL-SCNC: 140 MMOL/L — SIGNIFICANT CHANGE UP (ref 135–145)
WBC # BLD: 9.69 K/UL — SIGNIFICANT CHANGE UP (ref 3.8–10.5)
WBC # FLD AUTO: 9.69 K/UL — SIGNIFICANT CHANGE UP (ref 3.8–10.5)

## 2019-11-17 PROCEDURE — 99232 SBSQ HOSP IP/OBS MODERATE 35: CPT

## 2019-11-17 PROCEDURE — 71045 X-RAY EXAM CHEST 1 VIEW: CPT | Mod: 26

## 2019-11-17 RX ORDER — TRAMADOL HYDROCHLORIDE 50 MG/1
50 TABLET ORAL EVERY 6 HOURS
Refills: 0 | Status: DISCONTINUED | OUTPATIENT
Start: 2019-11-17 | End: 2019-11-17

## 2019-11-17 RX ORDER — BUDESONIDE AND FORMOTEROL FUMARATE DIHYDRATE 160; 4.5 UG/1; UG/1
2 AEROSOL RESPIRATORY (INHALATION)
Refills: 0 | Status: DISCONTINUED | OUTPATIENT
Start: 2019-11-17 | End: 2019-11-18

## 2019-11-17 RX ORDER — ENOXAPARIN SODIUM 100 MG/ML
40 INJECTION SUBCUTANEOUS DAILY
Refills: 0 | Status: DISCONTINUED | OUTPATIENT
Start: 2019-11-17 | End: 2019-11-18

## 2019-11-17 RX ORDER — ACETAMINOPHEN 500 MG
650 TABLET ORAL EVERY 6 HOURS
Refills: 0 | Status: DISCONTINUED | OUTPATIENT
Start: 2019-11-17 | End: 2019-11-17

## 2019-11-17 RX ORDER — CHLORHEXIDINE GLUCONATE 213 G/1000ML
1 SOLUTION TOPICAL
Refills: 0 | Status: DISCONTINUED | OUTPATIENT
Start: 2019-11-17 | End: 2019-11-18

## 2019-11-17 RX ORDER — SENNA PLUS 8.6 MG/1
2 TABLET ORAL AT BEDTIME
Refills: 0 | Status: DISCONTINUED | OUTPATIENT
Start: 2019-11-17 | End: 2019-11-18

## 2019-11-17 RX ORDER — PANTOPRAZOLE SODIUM 20 MG/1
40 TABLET, DELAYED RELEASE ORAL
Refills: 0 | Status: DISCONTINUED | OUTPATIENT
Start: 2019-11-17 | End: 2019-11-18

## 2019-11-17 RX ORDER — LIDOCAINE 4 G/100G
1 CREAM TOPICAL DAILY
Refills: 0 | Status: DISCONTINUED | OUTPATIENT
Start: 2019-11-17 | End: 2019-11-18

## 2019-11-17 RX ORDER — ONDANSETRON 8 MG/1
4 TABLET, FILM COATED ORAL ONCE
Refills: 0 | Status: COMPLETED | OUTPATIENT
Start: 2019-11-17 | End: 2019-11-17

## 2019-11-17 RX ORDER — OXYCODONE HYDROCHLORIDE 5 MG/1
5 TABLET ORAL EVERY 4 HOURS
Refills: 0 | Status: DISCONTINUED | OUTPATIENT
Start: 2019-11-17 | End: 2019-11-17

## 2019-11-17 RX ORDER — POLYETHYLENE GLYCOL 3350 17 G/17G
17 POWDER, FOR SOLUTION ORAL DAILY
Refills: 0 | Status: DISCONTINUED | OUTPATIENT
Start: 2019-11-17 | End: 2019-11-18

## 2019-11-17 RX ORDER — HYDROMORPHONE HYDROCHLORIDE 2 MG/ML
0.25 INJECTION INTRAMUSCULAR; INTRAVENOUS; SUBCUTANEOUS ONCE
Refills: 0 | Status: DISCONTINUED | OUTPATIENT
Start: 2019-11-17 | End: 2019-11-17

## 2019-11-17 RX ORDER — IPRATROPIUM/ALBUTEROL SULFATE 18-103MCG
3 AEROSOL WITH ADAPTER (GRAM) INHALATION EVERY 6 HOURS
Refills: 0 | Status: DISCONTINUED | OUTPATIENT
Start: 2019-11-17 | End: 2019-11-18

## 2019-11-17 RX ORDER — INFLUENZA VIRUS VACCINE 15; 15; 15; 15 UG/.5ML; UG/.5ML; UG/.5ML; UG/.5ML
0.5 SUSPENSION INTRAMUSCULAR ONCE
Refills: 0 | Status: COMPLETED | OUTPATIENT
Start: 2019-11-17 | End: 2019-11-18

## 2019-11-17 RX ORDER — ACETAMINOPHEN 500 MG
975 TABLET ORAL EVERY 6 HOURS
Refills: 0 | Status: DISCONTINUED | OUTPATIENT
Start: 2019-11-17 | End: 2019-11-18

## 2019-11-17 RX ORDER — LIDOCAINE 4 G/100G
1 CREAM TOPICAL DAILY
Refills: 0 | Status: DISCONTINUED | OUTPATIENT
Start: 2019-11-17 | End: 2019-11-17

## 2019-11-17 RX ORDER — IBUPROFEN 200 MG
400 TABLET ORAL EVERY 6 HOURS
Refills: 0 | Status: DISCONTINUED | OUTPATIENT
Start: 2019-11-17 | End: 2019-11-17

## 2019-11-17 RX ORDER — TRAMADOL HYDROCHLORIDE 50 MG/1
25 TABLET ORAL EVERY 4 HOURS
Refills: 0 | Status: DISCONTINUED | OUTPATIENT
Start: 2019-11-17 | End: 2019-11-18

## 2019-11-17 RX ORDER — IBUPROFEN 200 MG
400 TABLET ORAL EVERY 6 HOURS
Refills: 0 | Status: DISCONTINUED | OUTPATIENT
Start: 2019-11-17 | End: 2019-11-18

## 2019-11-17 RX ADMIN — LIDOCAINE 1 PATCH: 4 CREAM TOPICAL at 01:34

## 2019-11-17 RX ADMIN — Medication 400 MILLIGRAM(S): at 18:50

## 2019-11-17 RX ADMIN — Medication 400 MILLIGRAM(S): at 23:11

## 2019-11-17 RX ADMIN — CHLORHEXIDINE GLUCONATE 1 APPLICATION(S): 213 SOLUTION TOPICAL at 06:14

## 2019-11-17 RX ADMIN — LIDOCAINE 1 PATCH: 4 CREAM TOPICAL at 07:00

## 2019-11-17 RX ADMIN — ENOXAPARIN SODIUM 40 MILLIGRAM(S): 100 INJECTION SUBCUTANEOUS at 12:30

## 2019-11-17 RX ADMIN — Medication 975 MILLIGRAM(S): at 01:34

## 2019-11-17 RX ADMIN — PANTOPRAZOLE SODIUM 40 MILLIGRAM(S): 20 TABLET, DELAYED RELEASE ORAL at 05:23

## 2019-11-17 RX ADMIN — Medication 975 MILLIGRAM(S): at 08:52

## 2019-11-17 RX ADMIN — Medication 975 MILLIGRAM(S): at 15:00

## 2019-11-17 RX ADMIN — BUDESONIDE AND FORMOTEROL FUMARATE DIHYDRATE 2 PUFF(S): 160; 4.5 AEROSOL RESPIRATORY (INHALATION) at 17:19

## 2019-11-17 RX ADMIN — BUDESONIDE AND FORMOTEROL FUMARATE DIHYDRATE 2 PUFF(S): 160; 4.5 AEROSOL RESPIRATORY (INHALATION) at 05:17

## 2019-11-17 RX ADMIN — Medication 400 MILLIGRAM(S): at 05:53

## 2019-11-17 RX ADMIN — Medication 975 MILLIGRAM(S): at 01:36

## 2019-11-17 RX ADMIN — ONDANSETRON 4 MILLIGRAM(S): 8 TABLET, FILM COATED ORAL at 02:05

## 2019-11-17 RX ADMIN — SENNA PLUS 2 TABLET(S): 8.6 TABLET ORAL at 23:11

## 2019-11-17 RX ADMIN — HYDROMORPHONE HYDROCHLORIDE 0.25 MILLIGRAM(S): 2 INJECTION INTRAMUSCULAR; INTRAVENOUS; SUBCUTANEOUS at 00:58

## 2019-11-17 RX ADMIN — Medication 975 MILLIGRAM(S): at 14:30

## 2019-11-17 RX ADMIN — Medication 125 MILLIMOLE(S): at 03:30

## 2019-11-17 RX ADMIN — HYDROMORPHONE HYDROCHLORIDE 0.25 MILLIGRAM(S): 2 INJECTION INTRAMUSCULAR; INTRAVENOUS; SUBCUTANEOUS at 00:40

## 2019-11-17 RX ADMIN — Medication 975 MILLIGRAM(S): at 02:04

## 2019-11-17 RX ADMIN — Medication 400 MILLIGRAM(S): at 12:30

## 2019-11-17 RX ADMIN — Medication 400 MILLIGRAM(S): at 13:00

## 2019-11-17 RX ADMIN — Medication 975 MILLIGRAM(S): at 23:11

## 2019-11-17 RX ADMIN — Medication 400 MILLIGRAM(S): at 05:23

## 2019-11-17 RX ADMIN — LIDOCAINE 1 PATCH: 4 CREAM TOPICAL at 14:31

## 2019-11-17 NOTE — CONSULT NOTE ADULT - ATTENDING COMMENTS
rib fractures.  multimodal pain control with tylenol, motrin, lidoderm patch and tramadol  frequent pulmonary assessments including mobilization, incentive spirotmeter  monitoring saturation of oxygen is requiring 4 liters of oxygen for 92% saturation

## 2019-11-17 NOTE — CONSULT NOTE ADULT - ASSESSMENT
78 y/o female with hx of COPD on home O2 s/p fall found to have right sided rib fx 7-8th     Neuro:   - pain control Tylenol   - Motrin  - lidoderm patch  - tramadol     Resp:  - c/w with nasal cannula  - monitor O2 sat  -encourage IS and OOB   -c/w Duoneb and Symbicort     Cardiac:  - monitor vital signs     GI:   - regular diet     Renal:   - monitor Is and Os   - monitor electrolytes and replete     Heme:   - monitor H/H  - DVT ppx    ID:   -no active issues    Endo:   - no active issues     Dispo: SICU

## 2019-11-17 NOTE — CONSULT NOTE ADULT - SUBJECTIVE AND OBJECTIVE BOX
SICU CONSULT NOTE    HPI  ABHISHEK EDWARDS is a 79 yr old female presents to ED c/o right chest wall pain s/p fall this afternoon. Pt states she was standing up from a chair when she lost her balance and fell against a pillar in her home, hitting her right side, denies headstrike, -LOC. Pt complains of right shoulder, chest wall, and right hip pain. States she has not been able to ambulate since the fall due to this pain. Usually pt ambulates with a cane, but is minimally ambulatory with SOB after a few steps. Pt with a history of COPD on 4L O2 at home. Able to pull 800-1000mL on IS. Imaging revealed right sided rib fx 7th and 8th, Patient transferred to SICU for respiratory monitoring given hx of COPD and home O2 requirements.       PAST MEDICAL HISTORY: Raynauds disease  GERD (gastroesophageal reflux disease)  COPD (Chronic Obstructive Pulmonary Disease)      PAST SURGICAL HISTORY: Status post cataract extraction  S/P left cataract extraction  History of appendectomy      FAMILY HISTORY: No pertinent family history in first degree relatives    CODE STATUS: full code     ALLERGIES: No Known Allergies      VITAL SIGNS:  ICU Vital Signs Last 24 Hrs  T(C): 36.6 (17 Nov 2019 00:30), Max: 36.9 (16 Nov 2019 20:40)  T(F): 97.9 (17 Nov 2019 00:30), Max: 98.4 (16 Nov 2019 20:40)  HR: 69 (17 Nov 2019 00:45) (69 - 87)  BP: 122/54 (17 Nov 2019 00:45) (107/55 - 141/64)  BP(mean): 83 (17 Nov 2019 00:45) (72 - 88)  ABP: --  ABP(mean): --  RR: 17 (17 Nov 2019 00:45) (16 - 20)  SpO2: 94% (17 Nov 2019 00:45) (93% - 96%)      NEURO  Exam: AAOx3  acetaminophen   Tablet .. 975 milliGRAM(s) Oral every 6 hours  ibuprofen  Tablet. 400 milliGRAM(s) Oral every 6 hours  traMADol 25 milliGRAM(s) Oral every 4 hours PRN Moderate Pain (4 - 6)      RESPIRATORY  Exam: CTABL, on 4L nasal cannula   albuterol/ipratropium for Nebulization 3 milliLiter(s) Nebulizer every 6 hours PRN Shortness of Breath and/or Wheezing  budesonide 160 MICROgram(s)/formoterol 4.5 MICROgram(s) Inhaler 2 Puff(s) Inhalation two times a day      CARDIOVASCULAR    Exam: RRR  Cardiac Rhythm: normal sinus rhythm       GI/NUTRITION  Exam: soft, NT, ND  Diet: regular   pantoprazole    Tablet 40 milliGRAM(s) Oral before breakfast  polyethylene glycol 3350 17 Gram(s) Oral daily PRN Constipation  senna 2 Tablet(s) Oral at bedtime      GENITOURINARY/RENAL      Weight (kg): 59 (11-16 @ 17:29)  11-16    143  |  102  |  21  ----------------------------<  159<H>  5.3   |  26  |  0.99    Ca    10.2      16 Nov 2019 18:30    TPro  6.8  /  Alb  3.8  /  TBili  0.5  /  DBili  x   /  AST  39  /  ALT  43  /  AlkPhos  178<H>  11-16    [ ] Lynne catheter, indication: urine output monitoring in critically ill patient    HEMATOLOGIC  [x ] VTE Prophylaxis:  enoxaparin Injectable 40 milliGRAM(s) SubCutaneous daily                          13.8   9.69  )-----------( 108      ( 17 Nov 2019 00:54 )             42.7     PT/INR - ( 17 Nov 2019 00:54 )   PT: 12.3 sec;   INR: 1.07 ratio         PTT - ( 16 Nov 2019 18:30 )  PTT:32.4 sec  Transfusion: [ ] PRBC	[ ] Platelets	[ ] FFP	[ ] Cryoprecipitate      INFECTIOUS DISEASES  influenza   Vaccine 0.5 milliLiter(s) IntraMuscular once    RECENT CULTURES:      ENDOCRINE    CAPILLARY BLOOD GLUCOSE          PATIENT CARE ACCESS DEVICES:  [ ] Peripheral IV  [ ] Central Venous Line	[ ] R	[ ] L	[ ] IJ	[ ] Fem	[ ] SC	Placed:   [ ] Arterial Line		[ ] R	[ ] L	[ ] Fem	[ ] Rad	[ ] Ax	Placed:   [ ] PICC:					[ ] Mediport  [ ] Urinary Catheter, Date Placed:   [x] Necessity of urinary, arterial, and venous catheters discussed    OTHER MEDICATIONS: chlorhexidine 2% Cloths 1 Application(s) Topical <User Schedule>  lidocaine   Patch 1 Patch Transdermal daily      IMAGING STUDIES:  < from: CT Chest No Cont (11.16.19 @ 20:12) >  COMPARISON:   CT CHEST 9/16/2019 7:51 PM     FINDINGS:   Thyroid: Hypodense right thyroid lobe lesion.   Lungs: Diffuse centrilobular emphysematous changes. Right basilar   atelectasis   versus scarring.No acute consolidation.   Pleural space: Minimal pleural hematoma. No pneumothorax. No pleural   effusion.   Heart: Unremarkable. No cardiomegaly. No pericardial effusion.   Aorta: Unremarkable. No aortic aneurysm.   Lymph nodes:Unremarkable. No enlarged lymph nodes.   Liver: Cirrhotic liver.   Kidneys and ureters: Bilateral renal cysts.   Stomach and bowel: Colonic diverticulosis.   Bones/joints: Acute right seventh and eighth posterior rib fractures.   Thoracic   spondylosis is present.  Soft tissues: Right chest wall subcutaneous air.     IMPRESSION:   1. Acute right seventh and eighth posterior rib fractures. Minimal   adjacent   pleural hematoma.   2. Hypodense right thyroid lobe lesion.   3. Diffuse centrilobular emphysematous changes. Right basilar atelectasis   versus scarring.No acute consolidation.

## 2019-11-18 ENCOUNTER — TRANSCRIPTION ENCOUNTER (OUTPATIENT)
Age: 79
End: 2019-11-18

## 2019-11-18 VITALS
HEART RATE: 65 BPM | SYSTOLIC BLOOD PRESSURE: 123 MMHG | OXYGEN SATURATION: 92 % | DIASTOLIC BLOOD PRESSURE: 58 MMHG | RESPIRATION RATE: 24 BRPM

## 2019-11-18 LAB
ANION GAP SERPL CALC-SCNC: 11 MMOL/L — SIGNIFICANT CHANGE UP (ref 5–17)
BUN SERPL-MCNC: 16 MG/DL — SIGNIFICANT CHANGE UP (ref 7–23)
CALCIUM SERPL-MCNC: 9.7 MG/DL — SIGNIFICANT CHANGE UP (ref 8.4–10.5)
CHLORIDE SERPL-SCNC: 105 MMOL/L — SIGNIFICANT CHANGE UP (ref 96–108)
CO2 SERPL-SCNC: 26 MMOL/L — SIGNIFICANT CHANGE UP (ref 22–31)
CREAT SERPL-MCNC: 1.11 MG/DL — SIGNIFICANT CHANGE UP (ref 0.5–1.3)
GLUCOSE SERPL-MCNC: 101 MG/DL — HIGH (ref 70–99)
HCT VFR BLD CALC: 39.1 % — SIGNIFICANT CHANGE UP (ref 34.5–45)
HGB BLD-MCNC: 12.6 G/DL — SIGNIFICANT CHANGE UP (ref 11.5–15.5)
MAGNESIUM SERPL-MCNC: 2.1 MG/DL — SIGNIFICANT CHANGE UP (ref 1.6–2.6)
MCHC RBC-ENTMCNC: 31 PG — SIGNIFICANT CHANGE UP (ref 27–34)
MCHC RBC-ENTMCNC: 32.2 GM/DL — SIGNIFICANT CHANGE UP (ref 32–36)
MCV RBC AUTO: 96.1 FL — SIGNIFICANT CHANGE UP (ref 80–100)
NRBC # BLD: 0 /100 WBCS — SIGNIFICANT CHANGE UP (ref 0–0)
PHOSPHATE SERPL-MCNC: 3.8 MG/DL — SIGNIFICANT CHANGE UP (ref 2.5–4.5)
PLATELET # BLD AUTO: 100 K/UL — LOW (ref 150–400)
POTASSIUM SERPL-MCNC: 5 MMOL/L — SIGNIFICANT CHANGE UP (ref 3.5–5.3)
POTASSIUM SERPL-SCNC: 5 MMOL/L — SIGNIFICANT CHANGE UP (ref 3.5–5.3)
RBC # BLD: 4.07 M/UL — SIGNIFICANT CHANGE UP (ref 3.8–5.2)
RBC # FLD: 13.3 % — SIGNIFICANT CHANGE UP (ref 10.3–14.5)
SODIUM SERPL-SCNC: 142 MMOL/L — SIGNIFICANT CHANGE UP (ref 135–145)
WBC # BLD: 6.11 K/UL — SIGNIFICANT CHANGE UP (ref 3.8–10.5)
WBC # FLD AUTO: 6.11 K/UL — SIGNIFICANT CHANGE UP (ref 3.8–10.5)

## 2019-11-18 PROCEDURE — 85027 COMPLETE CBC AUTOMATED: CPT

## 2019-11-18 PROCEDURE — 83735 ASSAY OF MAGNESIUM: CPT

## 2019-11-18 PROCEDURE — 99285 EMERGENCY DEPT VISIT HI MDM: CPT

## 2019-11-18 PROCEDURE — 73552 X-RAY EXAM OF FEMUR 2/>: CPT

## 2019-11-18 PROCEDURE — 80053 COMPREHEN METABOLIC PANEL: CPT

## 2019-11-18 PROCEDURE — 85610 PROTHROMBIN TIME: CPT

## 2019-11-18 PROCEDURE — 71250 CT THORAX DX C-: CPT

## 2019-11-18 PROCEDURE — 85730 THROMBOPLASTIN TIME PARTIAL: CPT

## 2019-11-18 PROCEDURE — 71045 X-RAY EXAM CHEST 1 VIEW: CPT | Mod: 26

## 2019-11-18 PROCEDURE — 73502 X-RAY EXAM HIP UNI 2-3 VIEWS: CPT

## 2019-11-18 PROCEDURE — 81001 URINALYSIS AUTO W/SCOPE: CPT

## 2019-11-18 PROCEDURE — 97165 OT EVAL LOW COMPLEX 30 MIN: CPT

## 2019-11-18 PROCEDURE — 84100 ASSAY OF PHOSPHORUS: CPT

## 2019-11-18 PROCEDURE — 90686 IIV4 VACC NO PRSV 0.5 ML IM: CPT

## 2019-11-18 PROCEDURE — 71045 X-RAY EXAM CHEST 1 VIEW: CPT

## 2019-11-18 PROCEDURE — 73030 X-RAY EXAM OF SHOULDER: CPT

## 2019-11-18 PROCEDURE — 94640 AIRWAY INHALATION TREATMENT: CPT

## 2019-11-18 PROCEDURE — 80048 BASIC METABOLIC PNL TOTAL CA: CPT

## 2019-11-18 PROCEDURE — 94664 DEMO&/EVAL PT USE INHALER: CPT

## 2019-11-18 PROCEDURE — 96374 THER/PROPH/DIAG INJ IV PUSH: CPT

## 2019-11-18 PROCEDURE — 97161 PT EVAL LOW COMPLEX 20 MIN: CPT

## 2019-11-18 RX ORDER — LIDOCAINE 4 G/100G
1 CREAM TOPICAL
Qty: 0 | Refills: 0 | DISCHARGE
Start: 2019-11-18

## 2019-11-18 RX ORDER — IBUPROFEN 200 MG
1 TABLET ORAL
Qty: 0 | Refills: 0 | DISCHARGE
Start: 2019-11-18

## 2019-11-18 RX ORDER — TRAMADOL HYDROCHLORIDE 50 MG/1
0.5 TABLET ORAL
Qty: 6 | Refills: 0
Start: 2019-11-18 | End: 2019-11-20

## 2019-11-18 RX ORDER — ACETAMINOPHEN 500 MG
3 TABLET ORAL
Qty: 0 | Refills: 0 | DISCHARGE
Start: 2019-11-18

## 2019-11-18 RX ORDER — LIDOCAINE 4 G/100G
1 CREAM TOPICAL
Qty: 5 | Refills: 0
Start: 2019-11-18 | End: 2019-11-22

## 2019-11-18 RX ADMIN — Medication 975 MILLIGRAM(S): at 11:58

## 2019-11-18 RX ADMIN — PANTOPRAZOLE SODIUM 40 MILLIGRAM(S): 20 TABLET, DELAYED RELEASE ORAL at 08:24

## 2019-11-18 RX ADMIN — Medication 975 MILLIGRAM(S): at 05:15

## 2019-11-18 RX ADMIN — TRAMADOL HYDROCHLORIDE 25 MILLIGRAM(S): 50 TABLET ORAL at 08:03

## 2019-11-18 RX ADMIN — ENOXAPARIN SODIUM 40 MILLIGRAM(S): 100 INJECTION SUBCUTANEOUS at 11:28

## 2019-11-18 RX ADMIN — TRAMADOL HYDROCHLORIDE 25 MILLIGRAM(S): 50 TABLET ORAL at 07:33

## 2019-11-18 RX ADMIN — Medication 975 MILLIGRAM(S): at 11:28

## 2019-11-18 RX ADMIN — INFLUENZA VIRUS VACCINE 0.5 MILLILITER(S): 15; 15; 15; 15 SUSPENSION INTRAMUSCULAR at 15:55

## 2019-11-18 RX ADMIN — BUDESONIDE AND FORMOTEROL FUMARATE DIHYDRATE 2 PUFF(S): 160; 4.5 AEROSOL RESPIRATORY (INHALATION) at 06:14

## 2019-11-18 RX ADMIN — LIDOCAINE 1 PATCH: 4 CREAM TOPICAL at 05:16

## 2019-11-18 RX ADMIN — LIDOCAINE 1 PATCH: 4 CREAM TOPICAL at 07:00

## 2019-11-18 RX ADMIN — Medication 400 MILLIGRAM(S): at 11:58

## 2019-11-18 RX ADMIN — Medication 400 MILLIGRAM(S): at 05:16

## 2019-11-18 RX ADMIN — Medication 400 MILLIGRAM(S): at 11:28

## 2019-11-18 NOTE — PROGRESS NOTE ADULT - SUBJECTIVE AND OBJECTIVE BOX
TRAUMA SURGERY DAILY PROGRESS NOTE:    Overnight:  No acute events.    Subjective:  Patient reports pain is well controlled. Denies N/V. Patient pulled greater than 1000 on IS. Patient not yet ambulating    Vital Signs Last 24 Hrs  T(C): 36.6 (18 Nov 2019 03:00), Max: 36.6 (18 Nov 2019 03:00)  T(F): 97.9 (18 Nov 2019 03:00), Max: 97.9 (18 Nov 2019 03:00)  HR: 60 (18 Nov 2019 07:00) (57 - 75)  BP: 128/63 (18 Nov 2019 07:00) (92/46 - 138/63)  BP(mean): 89 (18 Nov 2019 07:00) (67 - 92)  RR: 22 (18 Nov 2019 07:00) (20 - 46)  SpO2: 90% (18 Nov 2019 07:00) (87% - 96%)    Exam:  Gen: NAD, resting in bed  Resp: Airway patent, non-labored respirations  Abd: Soft, ND, NT, no rebound or guarding.  Ext: No edema, WWP  Neuro: AAOx3, no focal deficits    LABS:                        12.6   6.11  )-----------( 100      ( 18 Nov 2019 05:19 )             39.1     11-18    142  |  105  |  16  ----------------------------<  101<H>  5.0   |  26  |  1.11    Ca    9.7      18 Nov 2019 05:19  Phos  3.8     11-18  Mg     2.1     11-18    TPro  6.8  /  Alb  3.8  /  TBili  0.5  /  DBili  x   /  AST  39  /  ALT  43  /  AlkPhos  178<H>  11-16

## 2019-11-18 NOTE — DISCHARGE NOTE PROVIDER - CARE PROVIDER_API CALL
Maria Luisa Hernandez (MD)  Surgery; Surgical Critical Care  [address], [address] [address]  Phone: [phone]  Fax: [phone]  Follow Up Time:

## 2019-11-18 NOTE — PHYSICAL THERAPY INITIAL EVALUATION ADULT - ADDITIONAL COMMENTS
pt lives in private home with family, 4 steps to enter. At baseline, pt uses 4L O2 and requires assist for all functional mobility. Pt owns a rolling walker however prefers not to use it. Assist required on stairs.

## 2019-11-18 NOTE — OCCUPATIONAL THERAPY INITIAL EVALUATION ADULT - ADDITIONAL COMMENTS
Xray R Hip: (-)  Xray R Shoulder: No acute displaced fracture or dislocation in the right shoulder. Small osteophytes at the AC joint. Right seventh rib fracture is better seen on CT. The visualized lung field is clear.  CT Chest: Acute right seventh and eighth posterior rib fractures. Minimal adjacent pleural hematoma. No pneumothorax. Severe emphysema.

## 2019-11-18 NOTE — PHYSICAL THERAPY INITIAL EVALUATION ADULT - PRECAUTIONS/LIMITATIONS, REHAB EVAL
CONT: XRAy R hip/femur 11/16: NEg. XRay R shoulder 11/16: Neg. CT Chest 11/16: Acute right seventh and eighth posterior rib fractures. Minimal adjacent pleural hematoma. No pneumothorax.Severe emphysema. oxygen therapy device and L/min/fall precautions/CONT: XRAy R hip/femur 11/16: NEg. XRay R shoulder 11/16: Neg. CT Chest 11/16: Acute right seventh and eighth posterior rib fractures. Minimal adjacent pleural hematoma. No pneumothorax.Severe emphysema.

## 2019-11-18 NOTE — DISCHARGE NOTE PROVIDER - NSDCMRMEDTOKEN_GEN_ALL_CORE_FT
acetaminophen 325 mg oral tablet: 3 tab(s) orally every 6 hours  ibuprofen 400 mg oral tablet: 1 tab(s) orally every 6 hours  lidocaine 5% topical film: Apply topically to affected area once a day  pantoprazole 40 mg oral delayed release tablet: 1 tab(s) orally once a day (before a meal)  ProAir HFA 90 mcg/inh inhalation aerosol: 2 puff(s) inhaled 4 times a day, As Needed  Rolator Walker: Unsteady gait  Symbicort 160 mcg-4.5 mcg/inh inhalation aerosol: 2 puff(s) inhaled 2 times a day  traMADol 50 mg oral tablet: 0.5 tab(s) orally every 6 hours, As Needed -Moderate Pain (4 - 6) - for severe pain MDD:2 tablets acetaminophen 325 mg oral tablet: 3 tab(s) orally every 6 hours  ibuprofen 400 mg oral tablet: 1 tab(s) orally every 6 hours  lidocaine 5% topical film: Apply topically to affected area once a day  lidocaine 5% topical film: Apply topically to affected area once a day, As Needed   pantoprazole 40 mg oral delayed release tablet: 1 tab(s) orally once a day (before a meal)  ProAir HFA 90 mcg/inh inhalation aerosol: 2 puff(s) inhaled 4 times a day, As Needed  Héctor Walker: Unsteady gait  Symbicort 160 mcg-4.5 mcg/inh inhalation aerosol: 2 puff(s) inhaled 2 times a day  traMADol 50 mg oral tablet: 0.5 tab(s) orally every 6 hours, As Needed -Moderate Pain (4 - 6) - for severe pain MDD:2 tablets

## 2019-11-18 NOTE — DISCHARGE NOTE PROVIDER - NSDCFUADDINST_GEN_ALL_CORE_FT
Please return to the ED if you experience significant pain, fevers/chills, inability to tolerate a diet. Please return to the ED if you experience significant pain, fevers/chills, inability to tolerate a diet.  Continue your home oxygen as needed.

## 2019-11-18 NOTE — OCCUPATIONAL THERAPY INITIAL EVALUATION ADULT - LIVES WITH, PROFILE
Pt lives with family in private home with 4 steps to enter, flight to bedroom, tub in bathroom with shower chair. Pt requires assistance with ADLs and supervision to ambulate short distances prior to admission

## 2019-11-18 NOTE — PHYSICAL THERAPY INITIAL EVALUATION ADULT - PERTINENT HX OF CURRENT PROBLEM, REHAB EVAL
78 yo F h/o COPD, s/p mechanical fall at home. Pt was sitting in chair when she stood up quickly, pt fell into a pillar at home hitting her right chest and hearing a crack in her right ribs, unsure if hit head, pt c/o R chest pain 10/10 worse when taking a deep breath and right shoulder pain with decreased ROM on shoulder flexion, denies loss of consciousness, blood thinner use, abdominal pain, N/V.

## 2019-11-18 NOTE — DISCHARGE NOTE PROVIDER - NSDCFUADDAPPT_GEN_ALL_CORE_FT
You can follow up as needed with Dr. Hernandez.   Please follow w/ your PCP regarding your liver findings and for care. You can follow up as needed with Dr. Hernandez.   Please follow w/ your PCP for your primary care and regarding your liver findings.

## 2019-11-18 NOTE — OCCUPATIONAL THERAPY INITIAL EVALUATION ADULT - NS ASR FOLLOW COMMAND OT EVAL
100% of the time/in Vincentian, refused  phone grandson at bedside to translate/able to follow multistep instructions

## 2019-11-18 NOTE — DISCHARGE NOTE PROVIDER - CARE PROVIDERS DIRECT ADDRESSES
[email or web address]

## 2019-11-18 NOTE — PROGRESS NOTE ADULT - ASSESSMENT
ASSESSMENT:  79 yr old female s/p fall from standing, with right 7th-8th posterior rib fractures. Recovering in SICU    PLAN:  - Multimodal pain control  - Encourage continued use of IS, ambulation as tolerated, OOBTC daily, chest PT  - PT recs appreciated for dispo planning   - Appreciate SICU care    [facility]  [phone] with any questions

## 2019-11-18 NOTE — PROGRESS NOTE ADULT - ATTENDING COMMENTS
Dr. Rust (Attending Physician)  COPD on duonebs, symbicort, baseline 4 liter o2  Rib fractures multimodal pain control pulling 1500 mL on IS  MRI with liver mass being followed by pmd family aware    PT recs and will dispo today

## 2019-11-18 NOTE — PROGRESS NOTE ADULT - SUBJECTIVE AND OBJECTIVE BOX
HISTORY  ABHISHEK EDWARDS is a 79 yr old female presents to ED c/o right chest wall pain s/p fall this afternoon. Pt states she was standing up from a chair when she lost her balance and fell against a pillar in her home, hitting her right side, denies headstrike, -LOC. Pt complains of right shoulder, chest wall, and right hip pain. States she has not been able to ambulate since the fall due to this pain. Usually pt ambulates with a cane, but is minimally ambulatory with SOB after a few steps. Pt with a history of COPD on 4L O2 at home. Able to pull 800-1000mL on IS. Imaging revealed right sided rib fx 7th and 8th, Patient transferred to SICU for respiratory monitoring given hx of COPD and home O2 requirements.     24 HOUR EVENTS:  - No acute events overnight     SUBJECTIVE/ROS:  [ ] A ten-point review of systems was otherwise negative except as noted.  [ ] Due to altered mental status/intubation, subjective information were not able to be obtained from the patient. History was obtained, to the extent possible, from review of the chart and collateral sources of information.      NEURO  RASS:     GCS:     CAM ICU:  Exam: awake, alert, oriented  Meds: acetaminophen   Tablet .. 975 milliGRAM(s) Oral every 6 hours  ibuprofen  Tablet. 400 milliGRAM(s) Oral every 6 hours  traMADol 25 milliGRAM(s) Oral every 4 hours PRN Moderate Pain (4 - 6)    [x] Adequacy of sedation and pain control has been assessed and adjusted      RESPIRATORY  RR: 29 (11-18-19 @ 03:00) (20 - 46)  SpO2: 94% (11-18-19 @ 03:00) (87% - 97%)  Wt(kg): --  Exam: unlabored, clear to auscultation bilaterally  Mechanical Ventilation:     [N/A] Extubation Readiness Assessed  Meds: albuterol/ipratropium for Nebulization 3 milliLiter(s) Nebulizer every 6 hours PRN Shortness of Breath and/or Wheezing  budesonide 160 MICROgram(s)/formoterol 4.5 MICROgram(s) Inhaler 2 Puff(s) Inhalation two times a day        CARDIOVASCULAR  HR: 57 (11-18-19 @ 03:00) (56 - 75)  BP: 104/51 (11-18-19 @ 03:00) (92/46 - 137/64)  BP(mean): 73 (11-18-19 @ 03:00) (67 - 92)  ABP: --  ABP(mean): --  Wt(kg): --  CVP(cm H2O): --      Exam: regular rate and rhythm  Cardiac Rhythm: sinus  Perfusion     [x]Adequate   [ ]Inadequate  Mentation   [x]Normal       [ ]Reduced  Extremities  [x]Warm         [ ]Cool  Volume Status [ ]Hypervolemic [x]Euvolemic [ ]Hypovolemic  Meds:       GI/NUTRITION  Exam: soft, nontender, nondistended, incision C/D/I  Diet:  Meds: pantoprazole    Tablet 40 milliGRAM(s) Oral before breakfast  polyethylene glycol 3350 17 Gram(s) Oral daily PRN Constipation  senna 2 Tablet(s) Oral at bedtime      GENITOURINARY  I&O's Detail    11-16 @ 07:01  -  11-17 @ 07:00  --------------------------------------------------------  IN:    IV PiggyBack: 250 mL    Oral Fluid: 250 mL  Total IN: 500 mL    OUT:    Incontinent per Condom Catheter: 200 mL  Total OUT: 200 mL    Total NET: 300 mL      11-17 @ 07:01  -  11-18 @ 03:38  --------------------------------------------------------  IN:    Oral Fluid: 1000 mL  Total IN: 1000 mL    OUT:    Incontinent per Condom Catheter: 550 mL  Total OUT: 550 mL    Total NET: 450 mL          11-17    140  |  105  |  19  ----------------------------<  128<H>  4.4   |  22  |  0.87    Ca    9.9      17 Nov 2019 00:54  Phos  2.8     11-17  Mg     2.0     11-17    TPro  6.8  /  Alb  3.8  /  TBili  0.5  /  DBili  x   /  AST  39  /  ALT  43  /  AlkPhos  178<H>  11-16    [ ] Lynne catheter, indication: N/A  Meds:       HEMATOLOGIC  Meds: enoxaparin Injectable 40 milliGRAM(s) SubCutaneous daily    [x] VTE Prophylaxis                        13.8   9.69  )-----------( 108      ( 17 Nov 2019 00:54 )             42.7     PT/INR - ( 17 Nov 2019 00:54 )   PT: 12.3 sec;   INR: 1.07 ratio         PTT - ( 17 Nov 2019 00:54 )  PTT:21.8 sec  Transfusion     [ ] PRBC   [ ] Platelets   [ ] FFP   [ ] Cryoprecipitate      INFECTIOUS DISEASES    RECENT CULTURES:    Meds: influenza   Vaccine 0.5 milliLiter(s) IntraMuscular once        ENDOCRINE  CAPILLARY BLOOD GLUCOSE        Meds:       ACCESS DEVICES:  [ ] Peripheral IV  [ ] Central Venous Line	[ ] R	[ ] L	[ ] IJ	[ ] Fem	[ ] SC	Placed:   [ ] Arterial Line		[ ] R	[ ] L	[ ] Fem	[ ] Rad	[ ] Ax	Placed:   [ ] PICC:					[ ] Mediport  [ ] Urinary Catheter, Date Placed:   [x] Necessity of urinary, arterial, and venous catheters discussed    OTHER MEDICATIONS:  chlorhexidine 2% Cloths 1 Application(s) Topical <User Schedule>  lidocaine   Patch 1 Patch Transdermal daily      CODE STATUS:      IMAGING:

## 2019-11-18 NOTE — CHART NOTE - NSCHARTNOTEFT_GEN_A_CORE
Dr Hernandez spoke with patients grandson who was made aware of the incidental findings of a thyroid nodule (Hypodense right thyroid lobe lesion) and cirrhotic liver seen on CT.  Patient's son was given report of these findings and to follow up with PCP.         ELE ZAYAS  p [phone]

## 2019-11-18 NOTE — DISCHARGE NOTE NURSING/CASE MANAGEMENT/SOCIAL WORK - NSDCFUADDAPPT_GEN_ALL_CORE_FT
You can follow up as needed with Dr. Hernandez.   Please follow w/ your PCP for your primary care and regarding your liver findings.

## 2019-11-18 NOTE — OCCUPATIONAL THERAPY INITIAL EVALUATION ADULT - PERTINENT HX OF CURRENT PROBLEM, REHAB EVAL
79 yr old female presents to ED c/o right chest wall pain s/p fall this afternoon. Pt states she was standing up from a chair when she lost her balance and fell against a pillar in her home, hitting her right side, denies headstrike, -LOC. Pt complains of right shoulder, chest wall, and right hip pain. States she has not been able to ambulate since the fall due to this pain See below

## 2019-11-18 NOTE — DISCHARGE NOTE PROVIDER - HOSPITAL COURSE
79 yr old female presents to ED c/o right chest wall pain s/p fall this afternoon. Pt states she was standing up from a chair when she lost her balance and fell against a pillar in her home, hitting her right side, denies headstrike, -LOC. Pt complains of right shoulder, chest wall, and right hip pain. States she has not been able to ambulate since the fall due to this pain. Usually pt ambulates with a cane, but is minimally ambulatory with SOB after a few steps. Pt with a history of COPD on 4L O2 at home. Able to pull 800-1000mL on IS.         The pt was found to have right sided rib fx 7-8 and was brought to the SICU for hemodynamic monitoring. The pt felt well in the SICU and was at baseline. Her pain was well controlled. She worked with physical therapy who recommended no needs, and for the patient to return home. 79 yr old female presents to ED c/o right chest wall pain s/p fall this afternoon. Pt states she was standing up from a chair when she lost her balance and fell against a pillar in her home, hitting her right side, denies headstrike, -LOC. Pt complains of right shoulder, chest wall, and right hip pain. States she has not been able to ambulate since the fall due to this pain. Usually pt ambulates with a cane, but is minimally ambulatory with SOB after a few steps. Pt with a history of COPD on 4L O2 at home. Able to pull 800-1000mL on IS. THe patient had COPD without chronic respiratory failure.         The pt was found to have right sided rib fx 7-8 and was brought to the SICU for hemodynamic monitoring. The pt felt well in the SICU and was at baseline. Her pain was well controlled. She worked with physical therapy who recommended no needs, and for the patient to return home.

## 2019-11-18 NOTE — OCCUPATIONAL THERAPY INITIAL EVALUATION ADULT - PRECAUTIONS/LIMITATIONS, REHAB EVAL
Usually pt ambulates with a cane, but is minimally ambulatory with SOB after a few steps. Pt with a history of COPD on 4L O2 at home. Able to pull 800-1000mL on IS. Usually pt ambulates with a cane, but is minimally ambulatory with SOB after a few steps. Pt with a history of COPD on 4L O2 at home. Able to pull 800-1000mL on IS./fall precautions

## 2019-11-21 ENCOUNTER — INPATIENT (INPATIENT)
Facility: HOSPITAL | Age: 79
LOS: 8 days | Discharge: HOME | DRG: 190 | End: 2019-11-30
Attending: GENERAL ACUTE CARE HOSPITAL | Admitting: GENERAL ACUTE CARE HOSPITAL
Payer: MEDICARE

## 2019-11-21 VITALS
WEIGHT: 130.07 LBS | SYSTOLIC BLOOD PRESSURE: 150 MMHG | TEMPERATURE: 98 F | OXYGEN SATURATION: 100 % | DIASTOLIC BLOOD PRESSURE: 73 MMHG | RESPIRATION RATE: 18 BRPM | HEART RATE: 86 BPM

## 2019-11-21 DIAGNOSIS — Z98.49 CATARACT EXTRACTION STATUS, UNSPECIFIED EYE: Chronic | ICD-10-CM

## 2019-11-21 LAB — GAS PNL BLDV: SIGNIFICANT CHANGE UP

## 2019-11-21 PROCEDURE — 93010 ELECTROCARDIOGRAM REPORT: CPT

## 2019-11-21 PROCEDURE — 99285 EMERGENCY DEPT VISIT HI MDM: CPT

## 2019-11-21 RX ORDER — SODIUM CHLORIDE 9 MG/ML
500 INJECTION, SOLUTION INTRAVENOUS ONCE
Refills: 0 | Status: COMPLETED | OUTPATIENT
Start: 2019-11-21 | End: 2019-11-21

## 2019-11-21 RX ORDER — IPRATROPIUM/ALBUTEROL SULFATE 18-103MCG
3 AEROSOL WITH ADAPTER (GRAM) INHALATION ONCE
Refills: 0 | Status: COMPLETED | OUTPATIENT
Start: 2019-11-21 | End: 2019-11-21

## 2019-11-21 RX ORDER — MORPHINE SULFATE 50 MG/1
2 CAPSULE, EXTENDED RELEASE ORAL ONCE
Refills: 0 | Status: DISCONTINUED | OUTPATIENT
Start: 2019-11-21 | End: 2019-11-21

## 2019-11-21 RX ADMIN — SODIUM CHLORIDE 500 MILLILITER(S): 9 INJECTION, SOLUTION INTRAVENOUS at 23:52

## 2019-11-21 RX ADMIN — MORPHINE SULFATE 2 MILLIGRAM(S): 50 CAPSULE, EXTENDED RELEASE ORAL at 23:52

## 2019-11-21 RX ADMIN — Medication 125 MILLIGRAM(S): at 23:54

## 2019-11-21 RX ADMIN — Medication 3 MILLILITER(S): at 23:56

## 2019-11-21 NOTE — ED PROVIDER NOTE - CLINICAL SUMMARY MEDICAL DECISION MAKING FREE TEXT BOX
Benjamin PGY-2:  80yo F h/o COPD (80 pack years) on 4L O2 at all times pw cc of SOB. Possibly 2/2 splinting due to inadequate pain control vs COPD exacerbation in context of increased sputum production vs PNA vs PE due to immobility vs less likely cardiac, will get CTA, labs, ekg, give steroids, will give abx however will tailor to source, if no clear source found will give abx for COPD exacerbation, pain control, admit  clinically stable vss

## 2019-11-21 NOTE — ED PROVIDER NOTE - PROGRESS NOTE DETAILS
Benjamin PGY-2:  will admit for COPD excasterbation in context of sob and increased sputum production, CTA - for PNA, Spoke w/ PCP Dr. Yee, agrees with admission states to admit to Dr. Lozada, will call dr. lozada and admit

## 2019-11-21 NOTE — ED PROVIDER NOTE - OBJECTIVE STATEMENT
78yo F h/o COPD (80 pack years) on 4L O2 at all times pw cc of SOB    Has been producing more sputum then baseline since yesterday, no difference in smell along with gradual worsening of SOB. Has been taking Tylenol and ibuprofen, unable to tolerate the prescribed tramadol (vomits), still in pain. At baseline cannot go more then 5 steps without SOB due to worsening COPD.   +Chills, +2x vomiting yesterday, No burning on urination, No CP, no Hx of blood clots.     PCP: Arturo lawson   Last took ibuprofen/tylenol @ 8pm     Meds: Trilogy, proair, albuterol  BELA

## 2019-11-21 NOTE — ED ADULT NURSE REASSESSMENT NOTE - NS ED NURSE REASSESS COMMENT FT1
Pt  VS put in by RN Ailin Bourne at EMS Triage stated pt was sating 100% on RA, but she came in by EMS on a non-rebreather and upon assessment in Red pt was Sat 89-91% on the non-rebreather.

## 2019-11-21 NOTE — ED PROVIDER NOTE - ATTENDING CONTRIBUTION TO CARE
Afebrile. Awake and Alert. Lungs decreased air movement. Heart RRR. Abdomen soft NTND. CN II-XII grossly intact. Moves all extremities without lateralization.    r/o PNX  r/o COPD exacerbation  r/o GIB  r/o aspiration PNA  r/o CHF Afebrile. Awake and Alert. Lungs decreased air movement. Heart RRR. Abdomen soft NTND. CN II-XII grossly intact. Moves all extremities without lateralization.    r/o PNX  r/o COPD exacerbation  r/o GIB  r/o aspiration PNA  r/o CHF  r/o PE

## 2019-11-21 NOTE — ED PROVIDER NOTE - NS ED ROS FT
CONSTITUTIONAL: +chills  Eyes: No vision changes  Cardiovascular: No Chest pain  Respiratory: refer to HIP  Gastrointestinal: no abd pain  Genitourinary: no dysuria, no hematuria  SKIN: no rashes.  NEURO: no headache, no weakness or numbness  PSYCHIATRIC: no known mental health issues.

## 2019-11-21 NOTE — ED PROVIDER NOTE - SEVERE SEPSIS ALERT DETAILS
Possible COPD exacerbation, less likely PNA, however WBC WNL no rectal temperature currently sepsis not supported by overall clinical picture.

## 2019-11-21 NOTE — ED PROVIDER NOTE - PHYSICAL EXAMINATION
General: NAD, on 4l O2  HEENT: pupils equal and reactive, normal external ears bilaterally   Cardiac: RRR, no MRG appreciated  Resp: Decreased air sounds BL, symmetric chest wall rise  Abd: soft, nontender, nondistended  : no CVA tenderness  Neuro: Moving all extremities  Skin:  normal color for race  Lower Ext: 1+ pitting edema to Shin

## 2019-11-22 DIAGNOSIS — J44.1 CHRONIC OBSTRUCTIVE PULMONARY DISEASE WITH (ACUTE) EXACERBATION: ICD-10-CM

## 2019-11-22 LAB
ALBUMIN SERPL ELPH-MCNC: 3.8 G/DL — SIGNIFICANT CHANGE UP (ref 3.3–5)
ALBUMIN SERPL ELPH-MCNC: 4.3 G/DL — SIGNIFICANT CHANGE UP (ref 3.3–5)
ALP SERPL-CCNC: 199 U/L — HIGH (ref 40–120)
ALP SERPL-CCNC: 223 U/L — HIGH (ref 40–120)
ALT FLD-CCNC: 90 U/L — HIGH (ref 10–45)
ALT FLD-CCNC: 96 U/L — HIGH (ref 10–45)
ANION GAP SERPL CALC-SCNC: 12 MMOL/L — SIGNIFICANT CHANGE UP (ref 5–17)
ANION GAP SERPL CALC-SCNC: 14 MMOL/L — SIGNIFICANT CHANGE UP (ref 5–17)
ANION GAP SERPL CALC-SCNC: 18 MMOL/L — HIGH (ref 5–17)
APPEARANCE UR: CLEAR — SIGNIFICANT CHANGE UP
AST SERPL-CCNC: 42 U/L — HIGH (ref 10–40)
AST SERPL-CCNC: 47 U/L — HIGH (ref 10–40)
BASE EXCESS BLDA CALC-SCNC: 0.8 MMOL/L — SIGNIFICANT CHANGE UP (ref -2–2)
BASOPHILS # BLD AUTO: 0.03 K/UL — SIGNIFICANT CHANGE UP (ref 0–0.2)
BASOPHILS NFR BLD AUTO: 0.4 % — SIGNIFICANT CHANGE UP (ref 0–2)
BILIRUB SERPL-MCNC: 0.4 MG/DL — SIGNIFICANT CHANGE UP (ref 0.2–1.2)
BILIRUB SERPL-MCNC: 0.4 MG/DL — SIGNIFICANT CHANGE UP (ref 0.2–1.2)
BILIRUB UR-MCNC: NEGATIVE — SIGNIFICANT CHANGE UP
BUN SERPL-MCNC: 12 MG/DL — SIGNIFICANT CHANGE UP (ref 7–23)
BUN SERPL-MCNC: 13 MG/DL — SIGNIFICANT CHANGE UP (ref 7–23)
BUN SERPL-MCNC: 15 MG/DL — SIGNIFICANT CHANGE UP (ref 7–23)
CALCIUM SERPL-MCNC: 10.2 MG/DL — SIGNIFICANT CHANGE UP (ref 8.4–10.5)
CALCIUM SERPL-MCNC: 10.3 MG/DL — SIGNIFICANT CHANGE UP (ref 8.4–10.5)
CALCIUM SERPL-MCNC: 9.7 MG/DL — SIGNIFICANT CHANGE UP (ref 8.4–10.5)
CHLORIDE SERPL-SCNC: 100 MMOL/L — SIGNIFICANT CHANGE UP (ref 96–108)
CHLORIDE SERPL-SCNC: 101 MMOL/L — SIGNIFICANT CHANGE UP (ref 96–108)
CHLORIDE SERPL-SCNC: 102 MMOL/L — SIGNIFICANT CHANGE UP (ref 96–108)
CK SERPL-CCNC: 122 U/L — SIGNIFICANT CHANGE UP (ref 25–170)
CO2 BLDA-SCNC: 24 MMOL/L — SIGNIFICANT CHANGE UP (ref 22–30)
CO2 SERPL-SCNC: 22 MMOL/L — SIGNIFICANT CHANGE UP (ref 22–31)
CO2 SERPL-SCNC: 25 MMOL/L — SIGNIFICANT CHANGE UP (ref 22–31)
CO2 SERPL-SCNC: 26 MMOL/L — SIGNIFICANT CHANGE UP (ref 22–31)
COLOR SPEC: COLORLESS — SIGNIFICANT CHANGE UP
CREAT SERPL-MCNC: 0.84 MG/DL — SIGNIFICANT CHANGE UP (ref 0.5–1.3)
CREAT SERPL-MCNC: 0.85 MG/DL — SIGNIFICANT CHANGE UP (ref 0.5–1.3)
CREAT SERPL-MCNC: 1.17 MG/DL — SIGNIFICANT CHANGE UP (ref 0.5–1.3)
DIFF PNL FLD: NEGATIVE — SIGNIFICANT CHANGE UP
EOSINOPHIL # BLD AUTO: 0.18 K/UL — SIGNIFICANT CHANGE UP (ref 0–0.5)
EOSINOPHIL NFR BLD AUTO: 2.6 % — SIGNIFICANT CHANGE UP (ref 0–6)
GLUCOSE BLDC GLUCOMTR-MCNC: 150 MG/DL — HIGH (ref 70–99)
GLUCOSE SERPL-MCNC: 138 MG/DL — HIGH (ref 70–99)
GLUCOSE SERPL-MCNC: 159 MG/DL — HIGH (ref 70–99)
GLUCOSE SERPL-MCNC: 186 MG/DL — HIGH (ref 70–99)
GLUCOSE UR QL: NEGATIVE — SIGNIFICANT CHANGE UP
HCO3 BLDA-SCNC: 23 MMOL/L — SIGNIFICANT CHANGE UP (ref 21–29)
HCT VFR BLD CALC: 41.5 % — SIGNIFICANT CHANGE UP (ref 34.5–45)
HGB BLD-MCNC: 13 G/DL — SIGNIFICANT CHANGE UP (ref 11.5–15.5)
IMM GRANULOCYTES NFR BLD AUTO: 0.3 % — SIGNIFICANT CHANGE UP (ref 0–1.5)
KETONES UR-MCNC: NEGATIVE — SIGNIFICANT CHANGE UP
LEUKOCYTE ESTERASE UR-ACNC: ABNORMAL
LYMPHOCYTES # BLD AUTO: 1.61 K/UL — SIGNIFICANT CHANGE UP (ref 1–3.3)
LYMPHOCYTES # BLD AUTO: 23.4 % — SIGNIFICANT CHANGE UP (ref 13–44)
MAGNESIUM SERPL-MCNC: 2.1 MG/DL — SIGNIFICANT CHANGE UP (ref 1.6–2.6)
MCHC RBC-ENTMCNC: 30.4 PG — SIGNIFICANT CHANGE UP (ref 27–34)
MCHC RBC-ENTMCNC: 31.3 GM/DL — LOW (ref 32–36)
MCV RBC AUTO: 97 FL — SIGNIFICANT CHANGE UP (ref 80–100)
MONOCYTES # BLD AUTO: 0.82 K/UL — SIGNIFICANT CHANGE UP (ref 0–0.9)
MONOCYTES NFR BLD AUTO: 11.9 % — SIGNIFICANT CHANGE UP (ref 2–14)
NEUTROPHILS # BLD AUTO: 4.22 K/UL — SIGNIFICANT CHANGE UP (ref 1.8–7.4)
NEUTROPHILS NFR BLD AUTO: 61.4 % — SIGNIFICANT CHANGE UP (ref 43–77)
NITRITE UR-MCNC: NEGATIVE — SIGNIFICANT CHANGE UP
NRBC # BLD: 0 /100 WBCS — SIGNIFICANT CHANGE UP (ref 0–0)
NT-PROBNP SERPL-SCNC: 1347 PG/ML — HIGH (ref 0–300)
NT-PROBNP SERPL-SCNC: 465 PG/ML — HIGH (ref 0–300)
OB PNL STL: NEGATIVE — SIGNIFICANT CHANGE UP
PCO2 BLDA: 31 MMHG — LOW (ref 32–46)
PH BLDA: 7.48 — HIGH (ref 7.35–7.45)
PH UR: 6.5 — SIGNIFICANT CHANGE UP (ref 5–8)
PHOSPHATE SERPL-MCNC: 3.5 MG/DL — SIGNIFICANT CHANGE UP (ref 2.5–4.5)
PLATELET # BLD AUTO: 117 K/UL — LOW (ref 150–400)
PO2 BLDA: 70 MMHG — LOW (ref 74–108)
POTASSIUM SERPL-MCNC: 4.5 MMOL/L — SIGNIFICANT CHANGE UP (ref 3.5–5.3)
POTASSIUM SERPL-MCNC: 5.1 MMOL/L — SIGNIFICANT CHANGE UP (ref 3.5–5.3)
POTASSIUM SERPL-MCNC: 5.1 MMOL/L — SIGNIFICANT CHANGE UP (ref 3.5–5.3)
POTASSIUM SERPL-SCNC: 4.5 MMOL/L — SIGNIFICANT CHANGE UP (ref 3.5–5.3)
POTASSIUM SERPL-SCNC: 5.1 MMOL/L — SIGNIFICANT CHANGE UP (ref 3.5–5.3)
POTASSIUM SERPL-SCNC: 5.1 MMOL/L — SIGNIFICANT CHANGE UP (ref 3.5–5.3)
PROT SERPL-MCNC: 6.8 G/DL — SIGNIFICANT CHANGE UP (ref 6–8.3)
PROT SERPL-MCNC: 7.7 G/DL — SIGNIFICANT CHANGE UP (ref 6–8.3)
PROT UR-MCNC: NEGATIVE — SIGNIFICANT CHANGE UP
RAPID RVP RESULT: SIGNIFICANT CHANGE UP
RBC # BLD: 4.28 M/UL — SIGNIFICANT CHANGE UP (ref 3.8–5.2)
RBC # FLD: 13.9 % — SIGNIFICANT CHANGE UP (ref 10.3–14.5)
SAO2 % BLDA: 95 % — SIGNIFICANT CHANGE UP (ref 92–96)
SODIUM SERPL-SCNC: 140 MMOL/L — SIGNIFICANT CHANGE UP (ref 135–145)
SP GR SPEC: 1.01 — SIGNIFICANT CHANGE UP (ref 1.01–1.02)
TROPONIN T, HIGH SENSITIVITY RESULT: 32 NG/L — SIGNIFICANT CHANGE UP (ref 0–51)
TROPONIN T, HIGH SENSITIVITY RESULT: 47 NG/L — SIGNIFICANT CHANGE UP (ref 0–51)
TROPONIN T, HIGH SENSITIVITY RESULT: 48 NG/L — SIGNIFICANT CHANGE UP (ref 0–51)
UROBILINOGEN FLD QL: NEGATIVE — SIGNIFICANT CHANGE UP
WBC # BLD: 6.88 K/UL — SIGNIFICANT CHANGE UP (ref 3.8–10.5)
WBC # FLD AUTO: 6.88 K/UL — SIGNIFICANT CHANGE UP (ref 3.8–10.5)

## 2019-11-22 PROCEDURE — 71045 X-RAY EXAM CHEST 1 VIEW: CPT | Mod: 26

## 2019-11-22 PROCEDURE — 71275 CT ANGIOGRAPHY CHEST: CPT | Mod: 26

## 2019-11-22 PROCEDURE — 99223 1ST HOSP IP/OBS HIGH 75: CPT | Mod: GC

## 2019-11-22 RX ORDER — SODIUM CHLORIDE 0.65 %
1 AEROSOL, SPRAY (ML) NASAL
Refills: 0 | Status: DISCONTINUED | OUTPATIENT
Start: 2019-11-22 | End: 2019-11-30

## 2019-11-22 RX ORDER — FUROSEMIDE 40 MG
40 TABLET ORAL ONCE
Refills: 0 | Status: COMPLETED | OUTPATIENT
Start: 2019-11-22 | End: 2019-11-22

## 2019-11-22 RX ORDER — ACETAMINOPHEN 500 MG
650 TABLET ORAL EVERY 6 HOURS
Refills: 0 | Status: DISCONTINUED | OUTPATIENT
Start: 2019-11-22 | End: 2019-11-28

## 2019-11-22 RX ORDER — POLYETHYLENE GLYCOL 3350 17 G/17G
17 POWDER, FOR SOLUTION ORAL
Refills: 0 | Status: DISCONTINUED | OUTPATIENT
Start: 2019-11-22 | End: 2019-11-30

## 2019-11-22 RX ORDER — SENNA PLUS 8.6 MG/1
2 TABLET ORAL AT BEDTIME
Refills: 0 | Status: DISCONTINUED | OUTPATIENT
Start: 2019-11-22 | End: 2019-11-30

## 2019-11-22 RX ORDER — LIDOCAINE 4 G/100G
1 CREAM TOPICAL DAILY
Refills: 0 | Status: DISCONTINUED | OUTPATIENT
Start: 2019-11-22 | End: 2019-11-30

## 2019-11-22 RX ORDER — MORPHINE SULFATE 50 MG/1
1 CAPSULE, EXTENDED RELEASE ORAL EVERY 6 HOURS
Refills: 0 | Status: DISCONTINUED | OUTPATIENT
Start: 2019-11-22 | End: 2019-11-28

## 2019-11-22 RX ORDER — PANTOPRAZOLE SODIUM 20 MG/1
40 TABLET, DELAYED RELEASE ORAL
Refills: 0 | Status: DISCONTINUED | OUTPATIENT
Start: 2019-11-22 | End: 2019-11-30

## 2019-11-22 RX ORDER — TRAMADOL HYDROCHLORIDE 50 MG/1
25 TABLET ORAL EVERY 6 HOURS
Refills: 0 | Status: DISCONTINUED | OUTPATIENT
Start: 2019-11-22 | End: 2019-11-28

## 2019-11-22 RX ORDER — AZITHROMYCIN 500 MG/1
500 TABLET, FILM COATED ORAL ONCE
Refills: 0 | Status: COMPLETED | OUTPATIENT
Start: 2019-11-22 | End: 2019-11-22

## 2019-11-22 RX ORDER — ENOXAPARIN SODIUM 100 MG/ML
30 INJECTION SUBCUTANEOUS DAILY
Refills: 0 | Status: DISCONTINUED | OUTPATIENT
Start: 2019-11-22 | End: 2019-11-30

## 2019-11-22 RX ORDER — IPRATROPIUM/ALBUTEROL SULFATE 18-103MCG
3 AEROSOL WITH ADAPTER (GRAM) INHALATION EVERY 6 HOURS
Refills: 0 | Status: DISCONTINUED | OUTPATIENT
Start: 2019-11-22 | End: 2019-11-30

## 2019-11-22 RX ORDER — BUDESONIDE AND FORMOTEROL FUMARATE DIHYDRATE 160; 4.5 UG/1; UG/1
2 AEROSOL RESPIRATORY (INHALATION)
Refills: 0 | Status: DISCONTINUED | OUTPATIENT
Start: 2019-11-22 | End: 2019-11-30

## 2019-11-22 RX ORDER — ONDANSETRON 8 MG/1
4 TABLET, FILM COATED ORAL EVERY 8 HOURS
Refills: 0 | Status: DISCONTINUED | OUTPATIENT
Start: 2019-11-22 | End: 2019-11-30

## 2019-11-22 RX ADMIN — ENOXAPARIN SODIUM 30 MILLIGRAM(S): 100 INJECTION SUBCUTANEOUS at 13:16

## 2019-11-22 RX ADMIN — MORPHINE SULFATE 2 MILLIGRAM(S): 50 CAPSULE, EXTENDED RELEASE ORAL at 01:00

## 2019-11-22 RX ADMIN — SODIUM CHLORIDE 500 MILLILITER(S): 9 INJECTION, SOLUTION INTRAVENOUS at 01:00

## 2019-11-22 RX ADMIN — AZITHROMYCIN 250 MILLIGRAM(S): 500 TABLET, FILM COATED ORAL at 04:33

## 2019-11-22 RX ADMIN — LIDOCAINE 1 PATCH: 4 CREAM TOPICAL at 20:00

## 2019-11-22 RX ADMIN — Medication 40 MILLIGRAM(S): at 12:35

## 2019-11-22 RX ADMIN — Medication 40 MILLIGRAM(S): at 20:55

## 2019-11-22 RX ADMIN — MORPHINE SULFATE 1 MILLIGRAM(S): 50 CAPSULE, EXTENDED RELEASE ORAL at 20:55

## 2019-11-22 RX ADMIN — Medication 40 MILLIGRAM(S): at 14:59

## 2019-11-22 RX ADMIN — Medication 3 MILLILITER(S): at 18:50

## 2019-11-22 RX ADMIN — LIDOCAINE 1 PATCH: 4 CREAM TOPICAL at 13:17

## 2019-11-22 RX ADMIN — MORPHINE SULFATE 1 MILLIGRAM(S): 50 CAPSULE, EXTENDED RELEASE ORAL at 22:38

## 2019-11-22 RX ADMIN — Medication 1 SPRAY(S): at 23:00

## 2019-11-22 RX ADMIN — Medication 3 MILLILITER(S): at 12:30

## 2019-11-22 NOTE — CONSULT NOTE ADULT - SUBJECTIVE AND OBJECTIVE BOX
PULMONARY/OUTPATIENT MEDICINE CONSULTATION    HPI: ABHISHEK EDWARDS is a 79y Female well known to our office, admitted recently for acute right 7th and 8th rib fractures after a mechanical fall.  We were not notified. Hx severe COPD maintained on 02 4 liters, Trelegy, and Duoneb.  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 Last night she developed some increased SOB. No wheezing or cough. Not congested now. Feeling much better this AM and near her baseline according to her family.      PAST MEDICAL & SURGICAL HISTORY:  Raynauds disease  GERD (gastroesophageal reflux disease)  COPD (Chronic Obstructive Pulmonary Disease): no intubation hx  last exacerb 2016  PRN 2 L NC  Status post cataract extraction: right eye in   S/P left cataract extraction  History of appendectomy    MEDICATIONS  (STANDING):  albuterol/ipratropium for Nebulization 3 milliLiter(s) Nebulizer every 6 hours  budesonide 160 MICROgram(s)/formoterol 4.5 MICROgram(s) Inhaler 2 Puff(s) Inhalation two times a day  enoxaparin Injectable 30 milliGRAM(s) SubCutaneous daily  lidocaine   Patch 1 Patch Transdermal daily  methylPREDNISolone sodium succinate Injectable 40 milliGRAM(s) IV Push every 8 hours  pantoprazole    Tablet 40 milliGRAM(s) Oral two times a day  polyethylene glycol 3350 17 Gram(s) Oral two times a day  senna 2 Tablet(s) Oral at bedtime    MEDICATIONS  (PRN):  acetaminophen   Tablet .. 650 milliGRAM(s) Oral every 6 hours PRN Mild Pain (1 - 3), Moderate Pain (4 - 6)  morphine  - Injectable 1 milliGRAM(s) IV Push every 6 hours PRN for breakthrough pain not responding to other meds  ondansetron Injectable 4 milliGRAM(s) IV Push every 8 hours PRN Nausea and/or Vomitinga  traMADol 25 milliGRAM(s) Oral every 6 hours PRN Severe Pain (7 - 10)    ALLERGIES:  No Known Allergies    FAMILY HISTORY:  No pertinent family history in first degree relatives    SOCIAL HISTORY:  Smoking History: Stopped  Alcohol: No  Travel/Pets/Exposures: No    REVIEW OF SYSTEMS:  CONSTITUTIONAL:  Negative for fever, chills, or fatigue  EYES:  Negative for blurred vision, diplopia or eye pain.    E/N/T:  Negative for diminished hearing, nasal congestion or sore throat.    CARDIOVASCULAR:  Negative for dizziness, palpitations or pedal edema.    RESPIRATORY:  Negative for cough or wheezing.    GASTROINTESTINAL:  Negative for nausea, vomiting, diarrhea, or abdominal pain    GENITOURINARY:  Negative for dysuria or hematuria.    MUSCULOSKELETAL:  Negative for arthralgias, myalgias or back pain.    INTEGUMENTARY:  Negative for rash.    NEUROLOGICAL:  Negative for dizziness or headaches.          PHYSICAL EXAM:  Vital Signs Last 24 Hrs  T(C): 36.7 (2019 08:31), Max: 37.1 (2019 01:39)  T(F): 98.1 (2019 08:31), Max: 98.8 (2019 01:39)  HR: 81 (2019 08:31) (81 - 104)  BP: 143/81 (2019 08:31) (129/68 - 150/73)  BP(mean): --  RR: 22 (2019 08:31) (18 - 30)  SpO2: 93% (2019 08:31) (87% - 100%)  I&O's Summary    GENERAL: Well developed, well nourished, in no apparent distress   EYES: Lids and conjunctiva are normal; PERRLA  E/N/T: Normal external ears and nose, pharynx benign  NECK: Supple,  without JVD, bruit or thyromegaly  CARDIOVASCULAR: Normal rate/rhythm. No murmurs  RESPIRATORY: Normal breath sounds without rales, rhonchi, or wheezes.    GASTROINTESTINAL: Bowel sounds present.  No masses or tenderness   LYMPHATIC: No enlargement of cervical nodes    EXTREMITIES: No clubbing, cyanosis, or edema  SKIN: No ulcerations, lesions or rashes   NEUROLOGIC: Grossly intact    PSYCHIATRIC: Alert and oriented x 3. appropriate affect and demeanor     LABS:                        13.0   6.88  )-----------( 117      ( 2019 23:38 )             41.5     -    140  |  102  |  15  ----------------------------<  138<H>  5.1   |  26  |  1.17    Ca    10.3      2019 23:38    TPro  6.8  /  Alb  3.8  /  TBili  0.4  /  DBili  x   /  AST  42<H>  /  ALT  90<H>  /  AlkPhos  199<H>        Urinalysis Basic - ( 2019 01:22 )    Color: Colorless / Appearance: Clear / S.010 / pH: x  Gluc: x / Ketone: Negative  / Bili: Negative / Urobili: Negative   Blood: x / Protein: Negative / Nitrite: Negative   Leuk Esterase: Moderate / RBC: 0 /hpf / WBC 10 /HPF   Sq Epi: x / Non Sq Epi: 0 /hpf / Bacteria: Moderate    Serum Pro-Brain Natriuretic Peptide: 465 pg/mL (19 @ 23:38)    MICROBIOLOGY: Noted    RADIOLOGY & ADDITIONAL STUDIES: CT unchanged.    Assessment:  ?Retained secretions but now improved  Recent right 7,8 rib fractures  Severe COPD  Now back to her baseline    Plan:  Nebs/Trelegy on discharge  02 as needed  Can likely switch to PO steroids with rapid taper  Pulmonary toilet reviewed. Lidocaine patch will help (OTC on discharge)  She had N/V with Tramadol apparently  Tylenol/Motrin  DVT prophylaxis  GI follow up as outpatient for her pancreatic lesions but doubt we will pursue them given her medical condition  Hopefully home later today/tomorrow  All reviewed with her family at length at her bedside.     Hammad Maier MD, FACP, [facility]  [place] [employer] [facility], [place] [address]  [phone]

## 2019-11-22 NOTE — RAPID RESPONSE TEAM SUMMARY - NSSITUATIONBACKGROUNDRRT_GEN_ALL_CORE
80 y/o female with hx of COPD on Home O2  discharged two days ago when she was admitted s/p mechanical fall resulting in fracture of R 7 and 8 th ribs.  Was admitted to SICU for observation and was hemodynamically stable.  Today pt presents to ED for SOB and cough.  She was noted to be hypoxic in ED for which RRT was called.  Pt already receiving steroids and abx.  Duonebs being given on arrival.  Pt started on BIPAP during RRT with improvement of work of breathing and O2 sats into the hig 90s-100.  Pt felt much more comfortable.  Breath sounds clear.

## 2019-11-22 NOTE — H&P ADULT - HISTORY OF PRESENT ILLNESS
78 y/o female with hx of COPD on Home O2  discharged two days ago when she was admitted s/p mechanical fall resulting in fracture of R 7 and 8 th ribs.. was admitted to SICU for observation and was hemodynmaically stable... during that hospitalization she was found to have a liver lesion : w/U with CT and MRI showed fatty infiltration .. 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 pt was discharged home and now presenting with SOB /N/V.  reports worsening SOB from her baseline... pain in chest at the site of fracture has not changed.. +productive cough of yellow sputum.. no fever or chills ..  had two episodes of N/V NB  no abd pain.  no BM for 6 days  no urianry sx   C/O BLE pain in calf : no swelling at this time   no neuro complaints   labs : neg trop *2  EKG : new LBBB compared to 2016    RVP neg   EKG CTA neg

## 2019-11-22 NOTE — ED ADULT NURSE NOTE - ED STAT RN HANDOFF DETAILS
Report received patient resting comfortably with daughter at bedside, patient with rapid respiration  on movement Md aware.

## 2019-11-22 NOTE — RAPID RESPONSE TEAM SUMMARY - NSOTHERINTERVENTIONSRRT_GEN_ALL_CORE
Labs ordered.  ABG had just been sent.  Advised primary team to repeat in 30 mins with BiPAP now on.  C/w steroids and abx.  If persistently tachy switch Duonebs to levalbuterol.

## 2019-11-22 NOTE — CONSULT NOTE ADULT - ASSESSMENT
78 y/o female with hx of COPD on Home O2 4L had a recent fall that resulted in rib fx here with worsening difficulty breathing admitted for COPD exacerbation, MICU was called for worsening hypoxia and needing Bipap.    -- Upon our evaluation pt with excellent minute ventilation, 30%FiO2 and sats well above 90%  -- PT comfortable but would recommend frequent assesment of pain as splinting from rib fxs was likely the cause of her decreasing resp status.      Pt does not need MICU care at this time.

## 2019-11-22 NOTE — CONSULT NOTE ADULT - ATTENDING COMMENTS
Patient seen and examined, agree with above assessment and plan as transcribed above.    - Suspect primary pulmonary process  - F/U echo  - Will follow with you    Macho Corrales MD, [facility]  BEEPER [phone]

## 2019-11-22 NOTE — CHART NOTE - NSCHARTNOTEFT_GEN_A_CORE
called  by NP   rapid response called for hypoxia   Pt placed on bipap   ABG reviewed   MICU called   discussed with pt and daughter at length at bedside re: their wishes : DNR/DNI : they would like to think about it..  f/u MICU   cont bipap   admitted to tele   35 min time spent called  by NP   rapid response called for hypoxic respiratory failure   Pt placed on bipap   ABG reviewed   MICU called   discussed with pt and daughter at length at bedside re: their wishes : DNR/DNI : they would like to think about it..  f/u MICU   cont bipap   admitted to tele   35 min time spent

## 2019-11-22 NOTE — CONSULT NOTE ADULT - SUBJECTIVE AND OBJECTIVE BOX
HPI: 80 y/o female with hx of COPD on Home O2  discharged two days ago when she was admitted s/p mechanical fall resulting in fracture of R 7 and 8 th ribs.. was admitted to SICU for observation and was hemodynmaically stable... during that hospitalization she was found to have a liver lesion : w/U with CT and MRI showed fatty infiltration .. 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 pt was discharged home and now presenting with SOB /N/V.  reports worsening SOB from her baseline... pain in chest at the site of fracture has not changed.. +productive cough of yellow sputum.. no fever or chills ..  had two episodes of N/V NB  no abd pain.  no BM for 6 days  no urianry sx   C/O BLE pain in calf : no swelling at this time   no neuro complaints   labs : neg trop *2  EKG : new LBBB compared to 2016    RVP neg   EKG CTA neg     MICU was called because pt was becoming more hypoxic and requiring BIPAP.    PAST MEDICAL & SURGICAL HISTORY:  Raynauds disease  GERD (gastroesophageal reflux disease)  COPD (Chronic Obstructive Pulmonary Disease): no intubation hx  last exacerb 2016  PRN 2 L NC  Status post cataract extraction: right eye in   S/P left cataract extraction  History of appendectomy      FAMILY HISTORY:  No pertinent family history in first degree relatives        Allergies    No Known Allergies    Intolerances        HOME MEDICATIONS:    REVIEW OF SYSTEMS:  Constitutional:   Gen: Denies fever, weight loss  CV: Denies chest pain, palpitations  Skin: Denies rash, erythema, color changes  Resp: Denies SOB, cough  Endo: Denies sensitivity to heat, cold, increased urination  GI: Denies constipation, nausea, vomiting  Msk: Denies back pain, LE swelling, extremity pain  : Denies dysuria, increased frequency  Neuro: Denies LOC, weakness, seizures  Psych: Denies hx of psych, hallucinations   [X] All other systems negative  [ ] Unable to assess ROS because ________    OBJECTIVE:  ICU Vital Signs Last 24 Hrs  T(C): 36.7 (2019 08:31), Max: 37.1 (2019 01:39)  T(F): 98.1 (2019 08:31), Max: 98.8 (2019 01:39)  HR: 92 (2019 14:10) (81 - 104)  BP: 143/81 (2019 08:31) (129/68 - 150/73)  BP(mean): --  ABP: --  ABP(mean): --  RR: 22 (2019 08:31) (18 - 30)  SpO2: 93% (2019 14:10) (87% - 100%)        CAPILLARY BLOOD GLUCOSE      POCT Blood Glucose.: 150 mg/dL (2019 12:46)      PHYSICAL EXAM:  Gen: WDWN, NAD  HEENT: EOMI, no nasal discharge, mucous membranes moist  CV: RRR, +S1/S2, no M/R/G  Resp: CTAB, no W/R/R  GI: Abdomen soft non-distended, NTTP, no masses  MSK: No open wounds, no bruising, no LE edema  Neuro: A&Ox4, following commands, moving all four extremities spontaneously  Psych: appropriate mood, denies AH, VH, SI     HOSPITAL MEDICATIONS:  MEDICATIONS  (STANDING):  albuterol/ipratropium for Nebulization 3 milliLiter(s) Nebulizer every 6 hours  budesonide 160 MICROgram(s)/formoterol 4.5 MICROgram(s) Inhaler 2 Puff(s) Inhalation two times a day  enoxaparin Injectable 30 milliGRAM(s) SubCutaneous daily  furosemide   Injectable 40 milliGRAM(s) IV Push once  lidocaine   Patch 1 Patch Transdermal daily  methylPREDNISolone sodium succinate Injectable 40 milliGRAM(s) IV Push every 8 hours  pantoprazole    Tablet 40 milliGRAM(s) Oral two times a day  polyethylene glycol 3350 17 Gram(s) Oral two times a day  senna 2 Tablet(s) Oral at bedtime    MEDICATIONS  (PRN):  acetaminophen   Tablet .. 650 milliGRAM(s) Oral every 6 hours PRN Mild Pain (1 - 3), Moderate Pain (4 - 6)  morphine  - Injectable 1 milliGRAM(s) IV Push every 6 hours PRN for breakthrough pain not responding to other meds  ondansetron Injectable 4 milliGRAM(s) IV Push every 8 hours PRN Nausea and/or Vomitinga  traMADol 25 milliGRAM(s) Oral every 6 hours PRN Severe Pain (7 - 10)      LABS:                        13.0   6.88  )-----------( 117      ( 2019 23:38 )             41.5     11-22    140  |  100  |  12  ----------------------------<  159<H>  5.1   |  22  |  0.84    Ca    10.2      2019 13:09  Phos  3.5       Mg     2.1         TPro  7.7  /  Alb  4.3  /  TBili  0.4  /  DBili  x   /  AST  47<H>  /  ALT  96<H>  /  AlkPhos  223<H>        Urinalysis Basic - ( 2019 01:22 )    Color: Colorless / Appearance: Clear / S.010 / pH: x  Gluc: x / Ketone: Negative  / Bili: Negative / Urobili: Negative   Blood: x / Protein: Negative / Nitrite: Negative   Leuk Esterase: Moderate / RBC: 0 /hpf / WBC 10 /HPF   Sq Epi: x / Non Sq Epi: 0 /hpf / Bacteria: Moderate      Arterial Blood Gas:   @ 12:44  7.48/31/70/23/95/.8  ABG lactate: --    Venous Blood Gas:   @ 23:38  7.36/56/23/31/30  VBG Lactate: 2.4    RADIOLOGY:  [x] Reviewed and interpreted by me

## 2019-11-22 NOTE — H&P ADULT - ASSESSMENT
80 y/o female with hx of COPD on Home O2  discharged two days ago when she was admitted s/p mechanical fall resulting in fracture of R 7 and 8 th ribs.. was admitted to SICU for observation and was hemodynmaically stable... during that hospitalization she was found to have a liver lesion : w/U with CT and MRI showed fatty infiltration .. 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 pt was discharged home and now presenting with SOB /N/V.  reports worsening SOB from her baseline... pain in chest at the site of fracture has not changed.. +productive cough of yellow sputum.. no fever or chills ..  had two episodes of N/V NB  no abd pain.  no BM for 6 days  no urianry sx   C/O BLE pain in calf : no swelling at this time   no neuro complaints   labs : neg trop *2  EKG : new LBBB compared to 2016    RVP neg   EKG CTA neg     - SOB: with productive cough and decreased air entry .. no pna   likley copd exacerbation   will start nebs and steroids  pul f/u     - LE pain : doubt DVT however will check US     - New EKG findings with LBB however only compared to 2016 .. no available EKG in between.. chekc Pro bnp and echo   no sings of CHF     - N/V: tolerating po   monitor and add prn zofran     - constipation : add bowel regimen     - rib Fx : IS and monitor   pain meds     - elevated LFT : ?? etiology .. monitor and if still elevated will repeat US

## 2019-11-22 NOTE — CHART NOTE - NSCHARTNOTEFT_GEN_A_CORE
[id number]  ABHISHEK EDWARDS    RRT called for Hypoxia- 02 sat 70's on NC 5L, Tachypnea 40's, and Tachycardia.  Pt admitted for Copd exacerbation after reporting worsening SOB from her baseline... pain in chest at the site of fracture has not changed, +productive cough of yellow sputum without fever or chills. Was discharged two days ago when she was admitted s/p mechanical fall resulting in fracture of R 7 and 8 th ribs.. was admitted to SICU for observation.  Now noted to be acutely Hypoxic, Tachypneic, and Tachycardia while in ED Holding. During RRT patient placed on Bipap, IV steroids, and giving Multiple nebs with some improvement. ICU consult requested by Attending Physician and recommendations pending. Daughter at bedside and informed of plan of care. Plan discussed with Attending Physician, Dr Fields. Repeat CXR requested. Will Endorse to floor team for follow up. Will Follow up with ICU recc's.     Destin Ballesteros PA-C   Dept of Medicine   [id number] [address]

## 2019-11-22 NOTE — CONSULT NOTE ADULT - ATTENDING COMMENTS
I have examined pt and agree with above exam and plan above. 80 y/o female  with h/o home 02 dependent  COPD admitted for acute on chronic hypoxemic respiratory failure. Pt currently looks comfortable on BiPAP. Pt's main problem seem to be pain control from r rib fractures. Pt did not tolerate tramadol at home. Only taking Tylenol and ibuprofen.   Recommend continue BiPAP for  now. Pain control with lidocaine patch and percocet prn.    Pt is not a MICU consult.

## 2019-11-22 NOTE — CONSULT NOTE ADULT - SUBJECTIVE AND OBJECTIVE BOX
HPI:  78 y/o Macedonian speaking female with PMHx of COPD on Home O2 recently admitted s/p mechanical fall with Right 7 and 8th rib fractures now presents with shortness of breath admitted with COPD exacerbation. Daughter at bedside assisted with translation. Patient also with productive cough and has had b/l LE pain around calves. Has residual pain from rib fracture. RRT events noted. Patient appears comfortable currently on Bipap. Denies any cardiac history. Denies hx CHF/CAD/MI or arrhythmia. Denies chest pain, palpitations, dizziness, syncope, abd pain, back pain, fever/chills.      PAST MEDICAL & SURGICAL HISTORY:  Raynauds disease  GERD (gastroesophageal reflux disease)  COPD (Chronic Obstructive Pulmonary Disease): no intubation hx  last exacerb 11/2016  PRN 2 L NC  Status post cataract extraction: right eye in 2014  S/P left cataract extraction  History of appendectomy        MEDICATIONS  (STANDING):  albuterol/ipratropium for Nebulization 3 milliLiter(s) Nebulizer every 6 hours  budesonide 160 MICROgram(s)/formoterol 4.5 MICROgram(s) Inhaler 2 Puff(s) Inhalation two times a day  enoxaparin Injectable 30 milliGRAM(s) SubCutaneous daily  lidocaine   Patch 1 Patch Transdermal daily  methylPREDNISolone sodium succinate Injectable 40 milliGRAM(s) IV Push every 8 hours  pantoprazole    Tablet 40 milliGRAM(s) Oral two times a day  polyethylene glycol 3350 17 Gram(s) Oral two times a day  senna 2 Tablet(s) Oral at bedtime      Allergies    No Known Allergies    Intolerances        FAMILY HISTORY:  No pertinent family history in first degree relatives    Noncontributory for premature coronary disease or sudden cardiac death    SOCIAL HISTORY:    [ ] Non-smoker  [x ] Former Smoker  [ ] Alcohol      REVIEW OF SYSTEMS:  [ ]chest pain  [ x ]shortness of breath  [  ]palpitations  [  ]syncope  [ ]near syncope [ ]upper extremity weakness   [ ] lower extremity weakness  [  ]diplopia  [  ]altered mental status   [  ]fevers  [ ]chills [ ]nausea  [ ]vomitting  [  ]dysphagia    [ ]abdominal pain  [ ]melena  [ ]BRBPR    [  ]epistaxis  [  ]rash  [ ]lower extremity edema    +cough  +B/L LE pain    [ x] All others negative	  [ ] Unable to obtain    PHYSICAL EXAM:  T(C): 37 (11-22-19 @ 13:20), Max: 37.1 (11-22-19 @ 01:39)  HR: 92 (11-22-19 @ 14:10) (81 - 104)  BP: 151/72 (11-22-19 @ 13:20) (129/68 - 151/72)  RR: 22 (11-22-19 @ 13:20) (18 - 30)  SpO2: 93% (11-22-19 @ 14:10) (87% - 100%)  Wt(kg): --    Appearance: NAD on Bipap  HEENT:   Normal oral mucosa, PERRL, EOMI	  Lymphatic: No lymphadenopathy , no edema  Cardiovascular: Normal S1 S2, No JVD, No murmurs , Peripheral pulses palpable 2+ bilaterally  Respiratory: Lungs clear to auscultation, normal effort 	  Gastrointestinal:  Soft, Non-tender, + BS	  Skin: No rashes, No ecchymoses, No cyanosis, warm to touch  Musculoskeletal: Normal range of motion, normal strength  Psychiatry:  Mood & affect appropriate    DIAGNOSTIC DATA:    TELEMETRY: Sinus 	      ECG: Sinus tachycardia at 114 bpm, LBBB (unchanged compared to EKG in 2016) 	    Echo: Pending    NST:    Cath:    < from: CT Angio Chest w/ IV Cont (11.22.19 @ 00:53) >    IMPRESSION:     No central pulmonary embolus. Evaluation of the segmental and   subsegmental pulmonary arteries is limited secondary to respiratory   motion.    Redemonstration of mildly comminuted and displaced right posterior   seventh and eighth rib fractures with associated small pleural effusion.   No pneumothorax.    Severe emphysema.    < end of copied text >    	  LABS:	 	  CARDIAC MARKERS ( 22 Nov 2019 11:29 )   U/L / CKMBx     / Troponin Tx     /                            13.0   6.88  )-----------( 117      ( 21 Nov 2019 23:38 )             41.5     11-22    140  |  100  |  12  ----------------------------<  159<H>  5.1   |  22  |  0.84    Ca    10.2      22 Nov 2019 13:09  Phos  3.5     11-22  Mg     2.1     11-22    TPro  7.7  /  Alb  4.3  /  TBili  0.4  /  DBili  x   /  AST  47<H>  /  ALT  96<H>  /  AlkPhos  223<H>  11-22    proBNP: Serum Pro-Brain Natriuretic Peptide: 1347 pg/mL (11-22 @ 11:29)  Serum Pro-Brain Natriuretic Peptide: 465 pg/mL (11-21 @ 23:38)    Lipid Profile:   HgA1c:   TSH:     ASSESSMENT/PLAN: 78 y/o Macedonian speaking female with PMHx of COPD on Home O2 recently admitted s/p mechanical fall with Right 7 and 8th rib fractures now presents with shortness of breath admitted with COPD exacerbation.    - Suspect symptoms likely due to underlying pulmonary condition  - Indeterminate troponin noted with negative CK  - CTA noted above with no central PE, severe emphysema  - Pulm f/u - IV steroids  - Check TTE to eval LV/RV function  - Further cardiac w/u pending above    Dirk Olsen PA-C  [facility]  Pager: [phone] HPI:  80 y/o Wallisian speaking female with PMHx of COPD on Home O2 recently admitted s/p mechanical fall with Right 7 and 8th rib fractures now presents with shortness of breath admitted with COPD exacerbation. Daughter at bedside assisted with translation. Patient also with productive cough and has had b/l LE pain around calves. Has residual pain from rib fracture. RRT events noted. Patient appears comfortable currently on Bipap. Denies any cardiac history. Denies hx CHF/CAD/MI or arrhythmia. Denies chest pain, palpitations, dizziness, syncope, abd pain, back pain, fever/chills.      PAST MEDICAL & SURGICAL HISTORY:  Raynauds disease  GERD (gastroesophageal reflux disease)  COPD (Chronic Obstructive Pulmonary Disease): no intubation hx  last exacerb 11/2016  PRN 2 L NC  Status post cataract extraction: right eye in 2014  S/P left cataract extraction  History of appendectomy        MEDICATIONS  (STANDING):  albuterol/ipratropium for Nebulization 3 milliLiter(s) Nebulizer every 6 hours  budesonide 160 MICROgram(s)/formoterol 4.5 MICROgram(s) Inhaler 2 Puff(s) Inhalation two times a day  enoxaparin Injectable 30 milliGRAM(s) SubCutaneous daily  lidocaine   Patch 1 Patch Transdermal daily  methylPREDNISolone sodium succinate Injectable 40 milliGRAM(s) IV Push every 8 hours  pantoprazole    Tablet 40 milliGRAM(s) Oral two times a day  polyethylene glycol 3350 17 Gram(s) Oral two times a day  senna 2 Tablet(s) Oral at bedtime      Allergies    No Known Allergies    Intolerances        FAMILY HISTORY:  No pertinent family history in first degree relatives    Noncontributory for premature coronary disease or sudden cardiac death    SOCIAL HISTORY:    [ ] Non-smoker  [x ] Former Smoker  [ ] Alcohol      REVIEW OF SYSTEMS:  [ ]chest pain  [ x ]shortness of breath  [  ]palpitations  [  ]syncope  [ ]near syncope [ ]upper extremity weakness   [ ] lower extremity weakness  [  ]diplopia  [  ]altered mental status   [  ]fevers  [ ]chills [ ]nausea  [ ]vomitting  [  ]dysphagia    [ ]abdominal pain  [ ]melena  [ ]BRBPR    [  ]epistaxis  [  ]rash  [ ]lower extremity edema    +cough  +B/L LE pain    [ x] All others negative	  [ ] Unable to obtain    PHYSICAL EXAM:  T(C): 37 (11-22-19 @ 13:20), Max: 37.1 (11-22-19 @ 01:39)  HR: 92 (11-22-19 @ 14:10) (81 - 104)  BP: 151/72 (11-22-19 @ 13:20) (129/68 - 151/72)  RR: 22 (11-22-19 @ 13:20) (18 - 30)  SpO2: 93% (11-22-19 @ 14:10) (87% - 100%)  Wt(kg): --    Appearance: NAD on Bipap  HEENT:   Normal oral mucosa, PERRL, EOMI	  Lymphatic: No lymphadenopathy , no edema  Cardiovascular: Normal S1 S2, No JVD, No murmurs , Peripheral pulses palpable 2+ bilaterally  Respiratory: Lungs clear to auscultation, normal effort 	  Gastrointestinal:  Soft, Non-tender, + BS	  Skin: No rashes, No ecchymoses, No cyanosis, warm to touch  Musculoskeletal: Normal range of motion, normal strength  Psychiatry:  Mood & affect appropriate    DIAGNOSTIC DATA:    TELEMETRY: Sinus 	      ECG: Sinus tachycardia at 114 bpm, LBBB (unchanged compared to EKG in 2016) 	    Echo: Pending    NST:    Cath:    < from: CT Angio Chest w/ IV Cont (11.22.19 @ 00:53) >    IMPRESSION:     No central pulmonary embolus. Evaluation of the segmental and   subsegmental pulmonary arteries is limited secondary to respiratory   motion.    Redemonstration of mildly comminuted and displaced right posterior   seventh and eighth rib fractures with associated small pleural effusion.   No pneumothorax.    Severe emphysema.    < end of copied text >    	  LABS:	 	  CARDIAC MARKERS ( 22 Nov 2019 11:29 )   U/L / CKMBx     / Troponin Tx     /                            13.0   6.88  )-----------( 117      ( 21 Nov 2019 23:38 )             41.5     11-22    140  |  100  |  12  ----------------------------<  159<H>  5.1   |  22  |  0.84    Ca    10.2      22 Nov 2019 13:09  Phos  3.5     11-22  Mg     2.1     11-22    TPro  7.7  /  Alb  4.3  /  TBili  0.4  /  DBili  x   /  AST  47<H>  /  ALT  96<H>  /  AlkPhos  223<H>  11-22    proBNP: Serum Pro-Brain Natriuretic Peptide: 1347 pg/mL (11-22 @ 11:29)  Serum Pro-Brain Natriuretic Peptide: 465 pg/mL (11-21 @ 23:38)    Lipid Profile:   HgA1c:   TSH:     ASSESSMENT/PLAN: 80 y/o Wallisian speaking female with PMHx of COPD on Home O2 recently admitted s/p mechanical fall with Right 7 and 8th rib fractures now presents with shortness of breath admitted with COPD exacerbation.    - Suspect symptoms likely due to underlying pulmonary condition  - Indeterminate troponin noted with negative CK  - CTA noted above with no central PE, severe emphysema  - Pulm f/u - IV steroids  - Check LE dopplers r/o DVT  - Check TTE to eval LV/RV function  - Further cardiac w/u pending above    Dirk Olsen PA-C  [facility]  Pager: [phone]

## 2019-11-22 NOTE — ED ADULT NURSE REASSESSMENT NOTE - NS ED NURSE REASSESS COMMENT FT1
Pt returned from CT scan at 0100 SOB with excessive Work Of Breathing. Pt sat 80% on NC 4L O2. Pt placed back on non-rebreather Pt sat back to 92% pt visibly calmed down and SOB & Work of  breathing was decreased at 0110. Pt placed back on 4L NC and pt Sat at 91%. MD made aware.

## 2019-11-23 LAB
ALBUMIN SERPL ELPH-MCNC: 4 G/DL — SIGNIFICANT CHANGE UP (ref 3.3–5)
ALP SERPL-CCNC: 208 U/L — HIGH (ref 40–120)
ALT FLD-CCNC: 68 U/L — HIGH (ref 10–45)
ANION GAP SERPL CALC-SCNC: 17 MMOL/L — SIGNIFICANT CHANGE UP (ref 5–17)
AST SERPL-CCNC: 39 U/L — SIGNIFICANT CHANGE UP (ref 10–40)
BASE EXCESS BLDA CALC-SCNC: 6.3 MMOL/L — HIGH (ref -2–2)
BILIRUB SERPL-MCNC: 0.3 MG/DL — SIGNIFICANT CHANGE UP (ref 0.2–1.2)
BUN SERPL-MCNC: 27 MG/DL — HIGH (ref 7–23)
CALCIUM SERPL-MCNC: 9.6 MG/DL — SIGNIFICANT CHANGE UP (ref 8.4–10.5)
CHLORIDE SERPL-SCNC: 100 MMOL/L — SIGNIFICANT CHANGE UP (ref 96–108)
CO2 BLDA-SCNC: 31 MMOL/L — HIGH (ref 22–30)
CO2 SERPL-SCNC: 26 MMOL/L — SIGNIFICANT CHANGE UP (ref 22–31)
CREAT SERPL-MCNC: 0.91 MG/DL — SIGNIFICANT CHANGE UP (ref 0.5–1.3)
GAS PNL BLDA: SIGNIFICANT CHANGE UP
GLUCOSE SERPL-MCNC: 176 MG/DL — HIGH (ref 70–99)
HCO3 BLDA-SCNC: 30 MMOL/L — HIGH (ref 21–29)
HOROWITZ INDEX BLDA+IHG-RTO: 30 — SIGNIFICANT CHANGE UP
PCO2 BLDA: 41 MMHG — SIGNIFICANT CHANGE UP (ref 32–46)
PH BLDA: 7.48 — HIGH (ref 7.35–7.45)
PO2 BLDA: 104 MMHG — SIGNIFICANT CHANGE UP (ref 74–108)
POTASSIUM SERPL-MCNC: 4.8 MMOL/L — SIGNIFICANT CHANGE UP (ref 3.5–5.3)
POTASSIUM SERPL-SCNC: 4.8 MMOL/L — SIGNIFICANT CHANGE UP (ref 3.5–5.3)
PROT SERPL-MCNC: 7.1 G/DL — SIGNIFICANT CHANGE UP (ref 6–8.3)
SAO2 % BLDA: 99 % — HIGH (ref 92–96)
SODIUM SERPL-SCNC: 143 MMOL/L — SIGNIFICANT CHANGE UP (ref 135–145)

## 2019-11-23 RX ADMIN — Medication 40 MILLIGRAM(S): at 05:14

## 2019-11-23 RX ADMIN — BUDESONIDE AND FORMOTEROL FUMARATE DIHYDRATE 2 PUFF(S): 160; 4.5 AEROSOL RESPIRATORY (INHALATION) at 17:29

## 2019-11-23 RX ADMIN — Medication 1 SPRAY(S): at 17:15

## 2019-11-23 RX ADMIN — Medication 3 MILLILITER(S): at 13:05

## 2019-11-23 RX ADMIN — SENNA PLUS 2 TABLET(S): 8.6 TABLET ORAL at 22:20

## 2019-11-23 RX ADMIN — LIDOCAINE 1 PATCH: 4 CREAM TOPICAL at 01:00

## 2019-11-23 RX ADMIN — BUDESONIDE AND FORMOTEROL FUMARATE DIHYDRATE 2 PUFF(S): 160; 4.5 AEROSOL RESPIRATORY (INHALATION) at 08:07

## 2019-11-23 RX ADMIN — Medication 3 MILLILITER(S): at 17:14

## 2019-11-23 RX ADMIN — Medication 1 SPRAY(S): at 05:20

## 2019-11-23 RX ADMIN — Medication 40 MILLIGRAM(S): at 13:06

## 2019-11-23 RX ADMIN — PANTOPRAZOLE SODIUM 40 MILLIGRAM(S): 20 TABLET, DELAYED RELEASE ORAL at 17:15

## 2019-11-23 RX ADMIN — Medication 3 MILLILITER(S): at 00:45

## 2019-11-23 RX ADMIN — LIDOCAINE 1 PATCH: 4 CREAM TOPICAL at 19:30

## 2019-11-23 RX ADMIN — ENOXAPARIN SODIUM 30 MILLIGRAM(S): 100 INJECTION SUBCUTANEOUS at 13:07

## 2019-11-23 RX ADMIN — Medication 40 MILLIGRAM(S): at 22:20

## 2019-11-23 RX ADMIN — LIDOCAINE 1 PATCH: 4 CREAM TOPICAL at 13:05

## 2019-11-23 RX ADMIN — Medication 650 MILLIGRAM(S): at 17:14

## 2019-11-23 RX ADMIN — Medication 650 MILLIGRAM(S): at 18:00

## 2019-11-23 RX ADMIN — Medication 3 MILLILITER(S): at 05:14

## 2019-11-23 NOTE — PHYSICAL THERAPY INITIAL EVALUATION ADULT - ADDITIONAL COMMENTS
Pt states she lives in a private house, 3 steps to enter, L handrail ascending; 1 flight of stairs to the bedroom. Pt reports a family member is always with her to provide assist in all aspects of mobility and ADLs. Pt is on home O2 (4L/min), owns straight cane, walker and wheelchair for outdoor mobility.

## 2019-11-23 NOTE — PHYSICAL THERAPY INITIAL EVALUATION ADULT - PERTINENT HX OF CURRENT PROBLEM, REHAB EVAL
Pt is a 78 y/o female w/ hx of COPD on Home O2 discharged two days ago when she was admitted s/p mechanical fall resulting in fracture of R 7 and 8 th ribs during that hospitalization she was found to have a liver lesion. Pt now presenting with worsening SOB. +productive cough of yellow sputum. Hosptial course complicated by RRT for hypoxia (O2 sat 70's on 5LO2 nc).

## 2019-11-23 NOTE — PROGRESS NOTE ADULT - SUBJECTIVE AND OBJECTIVE BOX
Patient is a 79y old  Female who presents with a chief complaint of SOB (22 Nov 2019 10:54)                                                               INTERVAL HPI/OVERNIGHT EVENTS:    REVIEW OF SYSTEMS:     CONSTITUTIONAL: No weakness, fevers or chills  EYES/ENT: No visual changes , no ear ache   NECK: No pain or stiffness  RESPIRATORY: No cough, wheezing,  No shortness of breath  CARDIOVASCULAR: No chest pain or palpitations  GASTROINTESTINAL: No abdominal pain  . No nausea, vomiting, or hematemesis; No diarrhea or constipation. No melena or hematochezia.  GENITOURINARY: No dysuria, frequency or hematuria  NEUROLOGICAL: No numbness or weakness  SKIN: No itching, burning, rashes, or lesions                                                                                                                                                                                                                                                                                 Medications:  MEDICATIONS  (STANDING):  albuterol/ipratropium for Nebulization 3 milliLiter(s) Nebulizer every 6 hours  budesonide 160 MICROgram(s)/formoterol 4.5 MICROgram(s) Inhaler 2 Puff(s) Inhalation two times a day  enoxaparin Injectable 30 milliGRAM(s) SubCutaneous daily  lidocaine   Patch 1 Patch Transdermal daily  methylPREDNISolone sodium succinate Injectable 40 milliGRAM(s) IV Push every 8 hours  pantoprazole    Tablet 40 milliGRAM(s) Oral two times a day  polyethylene glycol 3350 17 Gram(s) Oral two times a day  senna 2 Tablet(s) Oral at bedtime  sodium chloride 0.65% Nasal 1 Spray(s) Both Nostrils two times a day    MEDICATIONS  (PRN):  acetaminophen   Tablet .. 650 milliGRAM(s) Oral every 6 hours PRN Mild Pain (1 - 3), Moderate Pain (4 - 6)  morphine  - Injectable 1 milliGRAM(s) IV Push every 6 hours PRN for breakthrough pain not responding to other meds  ondansetron Injectable 4 milliGRAM(s) IV Push every 8 hours PRN Nausea and/or Vomitinga  traMADol 25 milliGRAM(s) Oral every 6 hours PRN Severe Pain (7 - 10)       Allergies    No Known Allergies    Intolerances      Vital Signs Last 24 Hrs  T(C): 36.8 (23 Nov 2019 22:14), Max: 36.8 (23 Nov 2019 22:14)  T(F): 98.2 (23 Nov 2019 22:14), Max: 98.2 (23 Nov 2019 22:14)  HR: 85 (23 Nov 2019 23:27) (72 - 94)  BP: 142/69 (23 Nov 2019 22:14) (142/69 - 168/70)  BP(mean): --  RR: 20 (23 Nov 2019 22:14) (20 - 22)  SpO2: 95% (23 Nov 2019 23:27) (92% - 95%)  CAPILLARY BLOOD GLUCOSE          11-22 @ 07:01  -  11-23 @ 07:00  --------------------------------------------------------  IN: 0 mL / OUT: 1400 mL / NET: -1400 mL    11-23 @ 07:01 - 11-23 @ 23:52  --------------------------------------------------------  IN: 720 mL / OUT: 300 mL / NET: 420 mL      Physical Exam:    Daily     Daily   General:  Well appearing, NAD, not cachetic  HEENT:  Nonicteric, PERRLA  CV:  RRR, S1S2   Lungs:  CTA B/L, no wheezes, rales, rhonchi  Abdomen:  Soft, non-tender, no distended, positive BS  Extremities:  2+ pulses, no c/c, no edema  Skin:  Warm and dry, no rashes  :  No dillon  Neuro:  AAOx3, non-focal, grossly intact                                                                                                                                                                                                                                                                                                LABS:                            11-23    143  |  100  |  27<H>  ----------------------------<  176<H>  4.8   |  26  |  0.91    Ca    9.6      23 Nov 2019 13:10  Phos  3.5     11-22  Mg     2.1     11-22    TPro  7.1  /  Alb  4.0  /  TBili  0.3  /  DBili  x   /  AST  39  /  ALT  68<H>  /  AlkPhos  208<H>  11-23                       RADIOLOGY & ADDITIONAL TESTS         I personally reviewed: [  ]EKG   [  ]CXR    [  ] CT      A/P:         Discussed with :     Arthur consultants' Notes   Time spent : Patient is a 79y old  Female who presents with a chief complaint of SOB (22 Nov 2019 10:54)                                                               INTERVAL HPI/OVERNIGHT EVENTS:    REVIEW OF SYSTEMS:     CONSTITUTIONAL: No weakness, fevers or chills  RESPIRATORY: No cough, wheezing,  No shortness of breath  CARDIOVASCULAR: No chest pain or palpitations  GASTROINTESTINAL: No abdominal pain  . No nausea, vomiting, or hematemesis; No diarrhea or constipation. No melena or hematochezia.  GENITOURINARY: No dysuria, frequency or hematuria  NEUROLOGICAL: No numbness or weakness                                                                                                                                                                                                                                                                                  Medications:  MEDICATIONS  (STANDING):  albuterol/ipratropium for Nebulization 3 milliLiter(s) Nebulizer every 6 hours  budesonide 160 MICROgram(s)/formoterol 4.5 MICROgram(s) Inhaler 2 Puff(s) Inhalation two times a day  enoxaparin Injectable 30 milliGRAM(s) SubCutaneous daily  lidocaine   Patch 1 Patch Transdermal daily  methylPREDNISolone sodium succinate Injectable 40 milliGRAM(s) IV Push every 8 hours  pantoprazole    Tablet 40 milliGRAM(s) Oral two times a day  polyethylene glycol 3350 17 Gram(s) Oral two times a day  senna 2 Tablet(s) Oral at bedtime  sodium chloride 0.65% Nasal 1 Spray(s) Both Nostrils two times a day    MEDICATIONS  (PRN):  acetaminophen   Tablet .. 650 milliGRAM(s) Oral every 6 hours PRN Mild Pain (1 - 3), Moderate Pain (4 - 6)  morphine  - Injectable 1 milliGRAM(s) IV Push every 6 hours PRN for breakthrough pain not responding to other meds  ondansetron Injectable 4 milliGRAM(s) IV Push every 8 hours PRN Nausea and/or Vomitinga  traMADol 25 milliGRAM(s) Oral every 6 hours PRN Severe Pain (7 - 10)       Allergies    No Known Allergies    Intolerances      Vital Signs Last 24 Hrs  T(C): 36.8 (23 Nov 2019 22:14), Max: 36.8 (23 Nov 2019 22:14)  T(F): 98.2 (23 Nov 2019 22:14), Max: 98.2 (23 Nov 2019 22:14)  HR: 85 (23 Nov 2019 23:27) (72 - 94)  BP: 142/69 (23 Nov 2019 22:14) (142/69 - 168/70)  BP(mean): --  RR: 20 (23 Nov 2019 22:14) (20 - 22)  SpO2: 95% (23 Nov 2019 23:27) (92% - 95%)  CAPILLARY BLOOD GLUCOSE          11-22 @ 07:01  -  11-23 @ 07:00  --------------------------------------------------------  IN: 0 mL / OUT: 1400 mL / NET: -1400 mL    11-23 @ 07:01  -  11-23 @ 23:52  --------------------------------------------------------  IN: 720 mL / OUT: 300 mL / NET: 420 mL      Physical Exam:    Daily     Daily   General:  NAD   HEENT:  Nonicteric, PERRLA  CV:  RRR, S1S2   Lungs:  mild crackles at R bases otherwise no wheezing   Abdomen:  Soft, non-tender, no distended, positive BS  Extremities:  2+ pulses, no c/c, no edema  Skin:  Warm and dry, no rashes  :  No dillon  Neuro:  AAOx3, non-focal, grossly intact                                                                                                                                                                                                                                                                                                LABS:                            11-23    143  |  100  |  27<H>  ----------------------------<  176<H>  4.8   |  26  |  0.91    Ca    9.6      23 Nov 2019 13:10  Phos  3.5     11-22  Mg     2.1     11-22    TPro  7.1  /  Alb  4.0  /  TBili  0.3  /  DBili  x   /  AST  39  /  ALT  68<H>  /  AlkPhos  208<H>  11-23

## 2019-11-23 NOTE — PROGRESS NOTE ADULT - SUBJECTIVE AND OBJECTIVE BOX
Subjective: pt seen and examined, no complaints, ROS - .          acetaminophen   Tablet .. 650 milliGRAM(s) Oral every 6 hours PRN  albuterol/ipratropium for Nebulization 3 milliLiter(s) Nebulizer every 6 hours  budesonide 160 MICROgram(s)/formoterol 4.5 MICROgram(s) Inhaler 2 Puff(s) Inhalation two times a day  enoxaparin Injectable 30 milliGRAM(s) SubCutaneous daily  lidocaine   Patch 1 Patch Transdermal daily  methylPREDNISolone sodium succinate Injectable 40 milliGRAM(s) IV Push every 8 hours  morphine  - Injectable 1 milliGRAM(s) IV Push every 6 hours PRN  ondansetron Injectable 4 milliGRAM(s) IV Push every 8 hours PRN  pantoprazole    Tablet 40 milliGRAM(s) Oral two times a day  polyethylene glycol 3350 17 Gram(s) Oral two times a day  senna 2 Tablet(s) Oral at bedtime  sodium chloride 0.65% Nasal 1 Spray(s) Both Nostrils two times a day  traMADol 25 milliGRAM(s) Oral every 6 hours PRN                            13.0   6.88  )-----------( 117      ( 21 Nov 2019 23:38 )             41.5       Hemoglobin: 13.0 g/dL (11-21 @ 23:38)      11-22    140  |  100  |  12  ----------------------------<  159<H>  5.1   |  22  |  0.84    Ca    10.2      22 Nov 2019 13:09  Phos  3.5     11-22  Mg     2.1     11-22    TPro  7.7  /  Alb  4.3  /  TBili  0.4  /  DBili  x   /  AST  47<H>  /  ALT  96<H>  /  AlkPhos  223<H>  11-22    Creatinine Trend: 0.84<--, 0.85<--, 1.17<--, 1.11<--, 0.87<--, 0.99<--    COAGS:     CARDIAC MARKERS ( 22 Nov 2019 11:29 )  x     / x     / 122 U/L / x     / x            T(C): 36.4 (11-23-19 @ 05:14), Max: 37 (11-22-19 @ 13:20)  HR: 88 (11-23-19 @ 07:36) (72 - 104)  BP: 168/70 (11-23-19 @ 05:14) (151/72 - 170/70)  RR: 20 (11-23-19 @ 05:14) (19 - 22)  SpO2: 92% (11-23-19 @ 07:36) (91% - 100%)  Wt(kg): --    I&O's Summary    22 Nov 2019 07:01  -  23 Nov 2019 07:00  --------------------------------------------------------  IN: 0 mL / OUT: 1400 mL / NET: -1400 mL    Appearance: Normal	  HEENT:   Normal oral mucosa, PERRL, EOMI	  Lymphatic: No lymphadenopathy , no edema  Cardiovascular: Normal S1 S2, No JVD, No murmurs , Peripheral pulses palpable 2+ bilaterally  Respiratory: Lungs clear to auscultation, normal effort 	  Gastrointestinal:  Soft, Non-tender, + BS	  Skin: No rashes, No ecchymoses, No cyanosis, warm to touch  Musculoskeletal: Normal range of motion, normal strength  Psychiatry:  Mood & affect appropriate    TELEMETRY:  NSR        ASSESSMENT/PLAN: 78 y/o [place] speaking female with PMHx of COPD on Home O2 recently admitted s/p mechanical fall with Right 7 and 8th rib fractures now presents with shortness of breath admitted with COPD exacerbation.    - pulm follow up  * Tele stable    -  GI / DVT prophylaxis.,  keep K>4, mag >2.0   - steroid / bronchodilator per Pulm   - pain management   - CK remains neg so far  - CTA noted above with no central PE, severe emphysema   - Check LE dopplers r/o DVT  - Check TTE to eval LV/RV function  - Further cardiac w/u pending above

## 2019-11-23 NOTE — PHYSICAL THERAPY INITIAL EVALUATION ADULT - TRANSFER TRAINING, PT EVAL
GOAL: Pt will perform all transfers with or without appropriate assistive device with supervision in 2 weeks

## 2019-11-23 NOTE — PHYSICAL THERAPY INITIAL EVALUATION ADULT - PLANNED THERAPY INTERVENTIONS, PT EVAL
balance training/gait training/bed mobility training/transfer training/stairs: GOAL: Pt will negotiate 1 flight of stairs with or without appropriate assistive device and handrail via reciprocal/step-to technique indep in 2 weeks.

## 2019-11-23 NOTE — PHYSICAL THERAPY INITIAL EVALUATION ADULT - PRECAUTIONS/LIMITATIONS, REHAB EVAL
continued from above: CT Chest: No central pulmonary embolus. Redemonstration of mildly comminuted and displaced right posterior seventh and eighth rib fractures with associated small pleural effusion. No pneumothorax. Severe emphysema. fall precautions/continued from above: BLE DOPPLERS (11/24): No evidence of DVT; CT Chest: No central pulmonary embolus. Redemonstration of mildly comminuted and displaced right posterior seventh and eighth rib fractures with associated small pleural effusion. No pneumothorax. Severe emphysema.

## 2019-11-23 NOTE — PHYSICAL THERAPY INITIAL EVALUATION ADULT - GENERAL OBSERVATIONS, REHAB EVAL
Received seated on chair, +O2 4L/min, +IVL, +tele, +external female cath; no complaints, agreeable to PT

## 2019-11-23 NOTE — PROGRESS NOTE ADULT - ASSESSMENT
80 y/o female with hx of COPD on Home O2  discharged two days ago when she was admitted s/p mechanical fall resulting in fracture of R 7 and 8 th ribs.. was admitted to SICU for observation and was hemodynmaically stable... during that hospitalization she was found to have a liver lesion : w/U with CT and MRI showed fatty infiltration .. 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 pt was discharged home and now presenting with SOB /N/V.  reports worsening SOB from her baseline... pain in chest at the site of fracture has not changed.. +productive cough of yellow sputum.. no fever or chills ..  had two episodes of N/V NB  no abd pain.  no BM for 6 days  no urianry sx   C/O BLE pain in calf : no swelling at this time   no neuro complaints   labs : neg trop *2  EKG : new LBBB compared to 2016    RVP neg   EKG CTA neg     - SOB: with productive cough and decreased air entry .. no pna   likley copd exacerbation   will start nebs and steroids  pul f/u     - LE pain : doubt DVT however will check US     - New EKG findings with LBB however only compared to 2016 .. no available EKG in between.. chekc Pro bnp and echo   no sings of CHF     - N/V: tolerating po   monitor and add prn zofran     - constipation : add bowel regimen     - rib Fx : IS and monitor   pain meds     - elevated LFT : ?? etiology .. monitor and if still elevated will repeat US 78 y/o female with hx of COPD on Home O2  discharged two days ago when she was admitted s/p mechanical fall resulting in fracture of R 7 and 8 th ribs.. was admitted to SICU for observation and was hemodynmaically stable... during that hospitalization she was found to have a liver lesion : w/U with CT and MRI showed fatty infiltration .. 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 pt was discharged home and now presenting with SOB /N/V.  reports worsening SOB from her baseline... pain in chest at the site of fracture has not changed.. +productive cough of yellow sputum.. no fever or chills ..  had two episodes of N/V NB  no abd pain.  no BM for 6 days  no urianry sx   C/O BLE pain in calf : no swelling at this time   no neuro complaints   labs : neg trop *2  EKG : new LBBB compared to 2016    RVP neg   EKG CTA neg     - acute hpoxic respiratory failure sec to COPD exacerrbation:    start nebs and steroids  pul f/u appreciated   consider one dose of lasix given possible element of CHF     - LE pain : doubt DVT however check  US     - New EKG findings with LBB .. dicussed with  : changes are old   Echo with NF few months ago .. will recheck Echo     - N/V: tolerating po   monitor and add prn zofran     - constipation : add bowel regimen     - rib Fx : IS and monitor   pain meds     - elevated LFT :  frannie sec to liver infiltration reported during last admit   monitor for now

## 2019-11-24 LAB
-  AMIKACIN: SIGNIFICANT CHANGE UP
-  AMPICILLIN/SULBACTAM: SIGNIFICANT CHANGE UP
-  AMPICILLIN: SIGNIFICANT CHANGE UP
-  AZTREONAM: SIGNIFICANT CHANGE UP
-  CEFAZOLIN: SIGNIFICANT CHANGE UP
-  CEFEPIME: SIGNIFICANT CHANGE UP
-  CEFOXITIN: SIGNIFICANT CHANGE UP
-  CEFTRIAXONE: SIGNIFICANT CHANGE UP
-  CIPROFLOXACIN: SIGNIFICANT CHANGE UP
-  GENTAMICIN: SIGNIFICANT CHANGE UP
-  IMIPENEM: SIGNIFICANT CHANGE UP
-  LEVOFLOXACIN: SIGNIFICANT CHANGE UP
-  MEROPENEM: SIGNIFICANT CHANGE UP
-  NITROFURANTOIN: SIGNIFICANT CHANGE UP
-  PIPERACILLIN/TAZOBACTAM: SIGNIFICANT CHANGE UP
-  TIGECYCLINE: SIGNIFICANT CHANGE UP
-  TOBRAMYCIN: SIGNIFICANT CHANGE UP
-  TRIMETHOPRIM/SULFAMETHOXAZOLE: SIGNIFICANT CHANGE UP
ALBUMIN SERPL ELPH-MCNC: 3.9 G/DL — SIGNIFICANT CHANGE UP (ref 3.3–5)
ALP SERPL-CCNC: 189 U/L — HIGH (ref 40–120)
ALT FLD-CCNC: 61 U/L — HIGH (ref 10–45)
ANION GAP SERPL CALC-SCNC: 15 MMOL/L — SIGNIFICANT CHANGE UP (ref 5–17)
AST SERPL-CCNC: 36 U/L — SIGNIFICANT CHANGE UP (ref 10–40)
BILIRUB SERPL-MCNC: 0.3 MG/DL — SIGNIFICANT CHANGE UP (ref 0.2–1.2)
BUN SERPL-MCNC: 35 MG/DL — HIGH (ref 7–23)
CALCIUM SERPL-MCNC: 9.5 MG/DL — SIGNIFICANT CHANGE UP (ref 8.4–10.5)
CHLORIDE SERPL-SCNC: 99 MMOL/L — SIGNIFICANT CHANGE UP (ref 96–108)
CO2 SERPL-SCNC: 26 MMOL/L — SIGNIFICANT CHANGE UP (ref 22–31)
CREAT SERPL-MCNC: 0.93 MG/DL — SIGNIFICANT CHANGE UP (ref 0.5–1.3)
CULTURE RESULTS: SIGNIFICANT CHANGE UP
GLUCOSE SERPL-MCNC: 135 MG/DL — HIGH (ref 70–99)
METHOD TYPE: SIGNIFICANT CHANGE UP
ORGANISM # SPEC MICROSCOPIC CNT: SIGNIFICANT CHANGE UP
ORGANISM # SPEC MICROSCOPIC CNT: SIGNIFICANT CHANGE UP
POTASSIUM SERPL-MCNC: 4.2 MMOL/L — SIGNIFICANT CHANGE UP (ref 3.5–5.3)
POTASSIUM SERPL-SCNC: 4.2 MMOL/L — SIGNIFICANT CHANGE UP (ref 3.5–5.3)
PROT SERPL-MCNC: 6.6 G/DL — SIGNIFICANT CHANGE UP (ref 6–8.3)
SODIUM SERPL-SCNC: 140 MMOL/L — SIGNIFICANT CHANGE UP (ref 135–145)
SPECIMEN SOURCE: SIGNIFICANT CHANGE UP

## 2019-11-24 PROCEDURE — 93970 EXTREMITY STUDY: CPT | Mod: 26

## 2019-11-24 RX ADMIN — Medication 3 MILLILITER(S): at 00:04

## 2019-11-24 RX ADMIN — Medication 650 MILLIGRAM(S): at 10:30

## 2019-11-24 RX ADMIN — Medication 3 MILLILITER(S): at 11:24

## 2019-11-24 RX ADMIN — BUDESONIDE AND FORMOTEROL FUMARATE DIHYDRATE 2 PUFF(S): 160; 4.5 AEROSOL RESPIRATORY (INHALATION) at 19:26

## 2019-11-24 RX ADMIN — Medication 650 MILLIGRAM(S): at 09:56

## 2019-11-24 RX ADMIN — Medication 40 MILLIGRAM(S): at 19:27

## 2019-11-24 RX ADMIN — PANTOPRAZOLE SODIUM 40 MILLIGRAM(S): 20 TABLET, DELAYED RELEASE ORAL at 19:27

## 2019-11-24 RX ADMIN — Medication 3 MILLILITER(S): at 05:11

## 2019-11-24 RX ADMIN — BUDESONIDE AND FORMOTEROL FUMARATE DIHYDRATE 2 PUFF(S): 160; 4.5 AEROSOL RESPIRATORY (INHALATION) at 05:10

## 2019-11-24 RX ADMIN — Medication 1 SPRAY(S): at 05:10

## 2019-11-24 RX ADMIN — Medication 3 MILLILITER(S): at 19:26

## 2019-11-24 RX ADMIN — POLYETHYLENE GLYCOL 3350 17 GRAM(S): 17 POWDER, FOR SOLUTION ORAL at 05:10

## 2019-11-24 RX ADMIN — Medication 40 MILLIGRAM(S): at 05:10

## 2019-11-24 RX ADMIN — LIDOCAINE 1 PATCH: 4 CREAM TOPICAL at 19:35

## 2019-11-24 RX ADMIN — PANTOPRAZOLE SODIUM 40 MILLIGRAM(S): 20 TABLET, DELAYED RELEASE ORAL at 05:10

## 2019-11-24 RX ADMIN — LIDOCAINE 1 PATCH: 4 CREAM TOPICAL at 00:00

## 2019-11-24 RX ADMIN — SENNA PLUS 2 TABLET(S): 8.6 TABLET ORAL at 21:22

## 2019-11-24 RX ADMIN — LIDOCAINE 1 PATCH: 4 CREAM TOPICAL at 23:01

## 2019-11-24 RX ADMIN — Medication 1 SPRAY(S): at 19:27

## 2019-11-24 RX ADMIN — LIDOCAINE 1 PATCH: 4 CREAM TOPICAL at 11:24

## 2019-11-24 RX ADMIN — ENOXAPARIN SODIUM 30 MILLIGRAM(S): 100 INJECTION SUBCUTANEOUS at 11:24

## 2019-11-24 NOTE — PROGRESS NOTE ADULT - SUBJECTIVE AND OBJECTIVE BOX
Subjective: pt seen and examined, no complaints, ROS - .           acetaminophen   Tablet .. 650 milliGRAM(s) Oral every 6 hours PRN  albuterol/ipratropium for Nebulization 3 milliLiter(s) Nebulizer every 6 hours  budesonide 160 MICROgram(s)/formoterol 4.5 MICROgram(s) Inhaler 2 Puff(s) Inhalation two times a day  enoxaparin Injectable 30 milliGRAM(s) SubCutaneous daily  lidocaine   Patch 1 Patch Transdermal daily  methylPREDNISolone sodium succinate Injectable 40 milliGRAM(s) IV Push every 8 hours  morphine  - Injectable 1 milliGRAM(s) IV Push every 6 hours PRN  ondansetron Injectable 4 milliGRAM(s) IV Push every 8 hours PRN  pantoprazole    Tablet 40 milliGRAM(s) Oral two times a day  polyethylene glycol 3350 17 Gram(s) Oral two times a day  senna 2 Tablet(s) Oral at bedtime  sodium chloride 0.65% Nasal 1 Spray(s) Both Nostrils two times a day  traMADol 25 milliGRAM(s) Oral every 6 hours PRN          Hemoglobin: 13.0 g/dL (11-21 @ 23:38)      11-23    143  |  100  |  27<H>  ----------------------------<  176<H>  4.8   |  26  |  0.91    Ca    9.6      23 Nov 2019 13:10  Phos  3.5     11-22  Mg     2.1     11-22    TPro  7.1  /  Alb  4.0  /  TBili  0.3  /  DBili  x   /  AST  39  /  ALT  68<H>  /  AlkPhos  208<H>  11-23    Creatinine Trend: 0.91<--, 0.84<--, 0.85<--, 1.17<--, 1.11<--, 0.87<--    COAGS:     CARDIAC MARKERS ( 22 Nov 2019 11:29 )  x     / x     / 122 U/L / x     / x            T(C): 36.4 (11-24-19 @ 04:56), Max: 36.8 (11-23-19 @ 22:14)  HR: 71 (11-24-19 @ 04:56) (71 - 94)  BP: 139/68 (11-24-19 @ 04:56) (139/68 - 147/70)  RR: 10 (11-24-19 @ 04:56) (10 - 22)  SpO2: 92% (11-24-19 @ 04:56) (92% - 95%)  Wt(kg): --    I&O's Summary    22 Nov 2019 07:01  -  23 Nov 2019 07:00  --------------------------------------------------------  IN: 0 mL / OUT: 1400 mL / NET: -1400 mL    23 Nov 2019 07:01  -  24 Nov 2019 06:25  --------------------------------------------------------  IN: 720 mL / OUT: 300 mL / NET: 420 mL      Cardiovascular: Normal S1 S2, No JVD, No murmurs , Peripheral pulses palpable 2+ bilaterally  Respiratory: Lungs clear to auscultation, normal effort 	  Gastrointestinal:  Soft, Non-tender, + BS	  Skin: No rashes, No ecchymoses, No cyanosis, warm to touch  Musculoskeletal: Normal range of motion, normal strength  Psychiatry:  Mood & affect appropriate    TELEMETRY:  NSR        ASSESSMENT/PLAN: 78 y/o [place] speaking female with PMHx of COPD on Home O2 recently admitted s/p mechanical fall with Right 7 and 8th rib fractures now presents with shortness of breath admitted with COPD exacerbation.    - pulm follow up  * Tele stable    -  GI / DVT prophylaxis.,  keep K>4, mag >2.0   - steroid / bronchodilator per Pulm   - Check TTE to eval LV/RV function  - Further cardiac w/u pending above

## 2019-11-24 NOTE — PROGRESS NOTE ADULT - SUBJECTIVE AND OBJECTIVE BOX
Patient is a 79y old  Female who presents with a chief complaint of SOB (24 Nov 2019 11:40)                                                               INTERVAL HPI/OVERNIGHT EVENTS:    REVIEW OF SYSTEMS:     CONSTITUTIONAL: No weakness, fevers or chills  EYES/ENT: No visual changes , no ear ache   NECK: No pain or stiffness  RESPIRATORY: No cough, wheezing,  No shortness of breath  CARDIOVASCULAR: No chest pain or palpitations  GASTROINTESTINAL: No abdominal pain  . No nausea, vomiting, or hematemesis; No diarrhea or constipation. No melena or hematochezia.  GENITOURINARY: No dysuria, frequency or hematuria  NEUROLOGICAL: No numbness or weakness  SKIN: No itching, burning, rashes, or lesions                                                                                                                                                                                                                                                                                 Medications:  MEDICATIONS  (STANDING):  albuterol/ipratropium for Nebulization 3 milliLiter(s) Nebulizer every 6 hours  budesonide 160 MICROgram(s)/formoterol 4.5 MICROgram(s) Inhaler 2 Puff(s) Inhalation two times a day  enoxaparin Injectable 30 milliGRAM(s) SubCutaneous daily  lidocaine   Patch 1 Patch Transdermal daily  methylPREDNISolone sodium succinate Injectable 40 milliGRAM(s) IV Push two times a day  pantoprazole    Tablet 40 milliGRAM(s) Oral two times a day  polyethylene glycol 3350 17 Gram(s) Oral two times a day  senna 2 Tablet(s) Oral at bedtime  sodium chloride 0.65% Nasal 1 Spray(s) Both Nostrils two times a day    MEDICATIONS  (PRN):  acetaminophen   Tablet .. 650 milliGRAM(s) Oral every 6 hours PRN Mild Pain (1 - 3), Moderate Pain (4 - 6)  morphine  - Injectable 1 milliGRAM(s) IV Push every 6 hours PRN for breakthrough pain not responding to other meds  ondansetron Injectable 4 milliGRAM(s) IV Push every 8 hours PRN Nausea and/or Vomitinga  traMADol 25 milliGRAM(s) Oral every 6 hours PRN Severe Pain (7 - 10)       Allergies    No Known Allergies    Intolerances      Vital Signs Last 24 Hrs  T(C): 36.7 (24 Nov 2019 20:37), Max: 36.8 (23 Nov 2019 22:14)  T(F): 98.1 (24 Nov 2019 20:37), Max: 98.2 (23 Nov 2019 22:14)  HR: 84 (24 Nov 2019 20:37) (71 - 89)  BP: 158/75 (24 Nov 2019 20:37) (139/68 - 158/75)  BP(mean): --  RR: 18 (24 Nov 2019 20:37) (10 - 20)  SpO2: 95% (24 Nov 2019 20:37) (92% - 96%)  CAPILLARY BLOOD GLUCOSE          11-23 @ 07:01  -  11-24 @ 07:00  --------------------------------------------------------  IN: 720 mL / OUT: 300 mL / NET: 420 mL    11-24 @ 07:01  -  11-24 @ 21:26  --------------------------------------------------------  IN: 1080 mL / OUT: 1200 mL / NET: -120 mL      Physical Exam:    Daily     Daily   General:  Well appearing, NAD, not cachetic  HEENT:  Nonicteric, PERRLA  CV:  RRR, S1S2   Lungs:  CTA B/L, no wheezes, rales, rhonchi  Abdomen:  Soft, non-tender, no distended, positive BS  Extremities:  2+ pulses, no c/c, no edema  Skin:  Warm and dry, no rashes  :  No dillon  Neuro:  AAOx3, non-focal, grossly intact                                                                                                                                                                                                                                                                                                LABS:                            11-24    140  |  99  |  35<H>  ----------------------------<  135<H>  4.2   |  26  |  0.93    Ca    9.5      24 Nov 2019 06:37    TPro  6.6  /  Alb  3.9  /  TBili  0.3  /  DBili  x   /  AST  36  /  ALT  61<H>  /  AlkPhos  189<H>  11-24                       RADIOLOGY & ADDITIONAL TESTS         I personally reviewed: [  ]EKG   [  ]CXR    [  ] CT      A/P:         Discussed with :     [facility] consultants' Notes   Time spent : Patient is a 79y old  Female who presents with a chief complaint of SOB (24 Nov 2019 11:40)                                                               INTERVAL HPI/OVERNIGHT EVENTS:    REVIEW OF SYSTEMS:     CONSTITUTIONAL: No weakness, fevers or chills  EYES/ENT: No visual changes , no ear ache   NECK: No pain or stiffness  RESPIRATORY: imrpvoing  shortness of breath  CARDIOVASCULAR: No chest pain or palpitations  GASTROINTESTINAL: No abdominal pain  . No nausea, vomiting, or hematemesis; No diarrhea or constipation. No melena or hematochezia.  GENITOURINARY: No dysuria, frequency or hematuria  NEUROLOGICAL: No numbness or weakness                                                                                                                                                                                                                                                                           Medications:  MEDICATIONS  (STANDING):  albuterol/ipratropium for Nebulization 3 milliLiter(s) Nebulizer every 6 hours  budesonide 160 MICROgram(s)/formoterol 4.5 MICROgram(s) Inhaler 2 Puff(s) Inhalation two times a day  enoxaparin Injectable 30 milliGRAM(s) SubCutaneous daily  lidocaine   Patch 1 Patch Transdermal daily  methylPREDNISolone sodium succinate Injectable 40 milliGRAM(s) IV Push two times a day  pantoprazole    Tablet 40 milliGRAM(s) Oral two times a day  polyethylene glycol 3350 17 Gram(s) Oral two times a day  senna 2 Tablet(s) Oral at bedtime  sodium chloride 0.65% Nasal 1 Spray(s) Both Nostrils two times a day    MEDICATIONS  (PRN):  acetaminophen   Tablet .. 650 milliGRAM(s) Oral every 6 hours PRN Mild Pain (1 - 3), Moderate Pain (4 - 6)  morphine  - Injectable 1 milliGRAM(s) IV Push every 6 hours PRN for breakthrough pain not responding to other meds  ondansetron Injectable 4 milliGRAM(s) IV Push every 8 hours PRN Nausea and/or Vomitinga  traMADol 25 milliGRAM(s) Oral every 6 hours PRN Severe Pain (7 - 10)       Allergies    No Known Allergies    Intolerances      Vital Signs Last 24 Hrs  T(C): 36.7 (24 Nov 2019 20:37), Max: 36.8 (23 Nov 2019 22:14)  T(F): 98.1 (24 Nov 2019 20:37), Max: 98.2 (23 Nov 2019 22:14)  HR: 84 (24 Nov 2019 20:37) (71 - 89)  BP: 158/75 (24 Nov 2019 20:37) (139/68 - 158/75)  BP(mean): --  RR: 18 (24 Nov 2019 20:37) (10 - 20)  SpO2: 95% (24 Nov 2019 20:37) (92% - 96%)  CAPILLARY BLOOD GLUCOSE          11-23 @ 07:01  -  11-24 @ 07:00  --------------------------------------------------------  IN: 720 mL / OUT: 300 mL / NET: 420 mL    11-24 @ 07:01  -  11-24 @ 21:26  --------------------------------------------------------  IN: 1080 mL / OUT: 1200 mL / NET: -120 mL      Physical Exam:    Daily     Daily   General:  NAD   HEENT:  Nonicteric, PERRLA  CV:  RRR, S1S2   Lungs: no wheezing   Abdomen:  Soft, non-tender, no distended, positive BS  Extremities:  2+ pulses, no c/c, no edema  Skin:  Warm and dry, no rashes  :  No dillon  Neuro:  AAOx3, non-focal, grossly intact                                                                                                                                                                                                                                                                                                LABS:                            11-24    140  |  99  |  35<H>  ----------------------------<  135<H>  4.2   |  26  |  0.93    Ca    9.5      24 Nov 2019 06:37    TPro  6.6  /  Alb  3.9  /  TBili  0.3  /  DBili  x   /  AST  36  /  ALT  61<H>  /  AlkPhos  189<H>  11-24

## 2019-11-24 NOTE — PROGRESS NOTE ADULT - ASSESSMENT
78 y/o female with hx of COPD on Home O2  discharged two days ago when she was admitted s/p mechanical fall resulting in fracture of R 7 and 8 th ribs.. was admitted to SICU for observation and was hemodynmaically stable... during that hospitalization she was found to have a liver lesion : w/U with CT and MRI showed fatty infiltration .. 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 pt was discharged home and now presenting with SOB /N/V.  reports worsening SOB from her baseline... pain in chest at the site of fracture has not changed.. +productive cough of yellow sputum.. no fever or chills ..  had two episodes of N/V NB  no abd pain.  no BM for 6 days  no urianry sx   C/O BLE pain in calf : no swelling at this time   no neuro complaints   labs : neg trop *2  EKG : new LBBB compared to 2016    RVP neg   EKG CTA neg     - SOB: with productive cough and decreased air entry .. no pna   likley copd exacerbation   will start nebs and steroids  pul f/u     - LE pain : doubt DVT however will check US     - New EKG findings with LBB however only compared to 2016 .. no available EKG in between.. chekc Pro bnp and echo   no sings of CHF     - N/V: tolerating po   monitor and add prn zofran     - constipation : add bowel regimen     - rib Fx : IS and monitor   pain meds     - elevated LFT : ?? etiology .. monitor and if still elevated will repeat US 80 y/o female with hx of COPD on Home O2  discharged two days ago when she was admitted s/p mechanical fall resulting in fracture of R 7 and 8 th ribs.. was admitted to SICU for observation and was hemodynmaically stable... during that hospitalization she was found to have a liver lesion : w/U with CT and MRI showed fatty infiltration .. 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 pt was discharged home and now presenting with SOB /N/V.  reports worsening SOB from her baseline... pain in chest at the site of fracture has not changed.. +productive cough of yellow sputum.. no fever or chills ..  had two episodes of N/V NB  no abd pain.  no BM for 6 days  no urianry sx   C/O BLE pain in calf : no swelling at this time   no neuro complaints   labs : neg trop *2  EKG : new LBBB compared to 2016    RVP neg   EKG CTA neg     - acute hpoxic respiratory failure sec to COPD exacerrbation:    improved wihht BIPAP and now on NC   start nebs and steroids  pul f/u appreciated   consider one dose of lasix given possible element of CHF     - LE pain : doubt DVT however check  US     - New EKG findings with LBB .. dicussed with  : changes are old   Echo with NF few months ago .. will recheck Echo     - N/V: tolerating po   monitor and add prn zofran     - constipation : add bowel regimen     - rib Fx : IS and monitor   pain meds     - elevated LFT :  frannie sec to liver infiltration reported during last admit   monitor for now

## 2019-11-24 NOTE — PROGRESS NOTE ADULT - SUBJECTIVE AND OBJECTIVE BOX
PULMONARY/OUTPATIENT MEDICINE PROGRESS NOTE:    INTERVAL HPI: Alert in bed. Feeling much better. On NC. Record reviewed. Discussed with family at her bedside.     MEDICATIONS  (STANDING):  albuterol/ipratropium for Nebulization 3 milliLiter(s) Nebulizer every 6 hours  budesonide 160 MICROgram(s)/formoterol 4.5 MICROgram(s) Inhaler 2 Puff(s) Inhalation two times a day  enoxaparin Injectable 30 milliGRAM(s) SubCutaneous daily  lidocaine   Patch 1 Patch Transdermal daily  methylPREDNISolone sodium succinate Injectable 40 milliGRAM(s) IV Push two times a day  pantoprazole    Tablet 40 milliGRAM(s) Oral two times a day  polyethylene glycol 3350 17 Gram(s) Oral two times a day  senna 2 Tablet(s) Oral at bedtime  sodium chloride 0.65% Nasal 1 Spray(s) Both Nostrils two times a day    MEDICATIONS  (PRN):  acetaminophen   Tablet .. 650 milliGRAM(s) Oral every 6 hours PRN Mild Pain (1 - 3), Moderate Pain (4 - 6)  morphine  - Injectable 1 milliGRAM(s) IV Push every 6 hours PRN for breakthrough pain not responding to other meds  ondansetron Injectable 4 milliGRAM(s) IV Push every 8 hours PRN Nausea and/or Vomitinga  traMADol 25 milliGRAM(s) Oral every 6 hours PRN Severe Pain (7 - 10)    No Known Allergies    REVIEW OF SYSTEMS:  CONSTITUTIONAL:  Negative for fever, chills, or night sweats  CARDIOVASCULAR:  Negative for chest pain or palpitations    RESPIRATORY:  Negative for cough, dyspnea or wheezing   GASTROINTESTINAL:  Negative for nausea, vomiting, diarrhea, or constipation         Vital Signs Last 24 Hrs  T(C): 36.4 (24 Nov 2019 04:56), Max: 36.8 (23 Nov 2019 22:14)  T(F): 97.5 (24 Nov 2019 04:56), Max: 98.2 (23 Nov 2019 22:14)  HR: 89 (24 Nov 2019 10:41) (71 - 94)  BP: 139/68 (24 Nov 2019 04:56) (139/68 - 147/70)  BP(mean): --  RR: 10 (24 Nov 2019 04:56) (10 - 22)  SpO2: 94% (24 Nov 2019 10:41) (92% - 95%)  I&O's Summary    23 Nov 2019 07:01  -  24 Nov 2019 07:00  --------------------------------------------------------  IN: 720 mL / OUT: 300 mL / NET: 420 mL      GENERAL: Appears comfortable  NECK: Supple,  without JVD, bruit, adenopathy, or thyromegaly  CARDIOVASCULAR: Normal rate/rhythm. No murmurs  RESPIRATORY: Normal breath sounds without rales, rhonchi, or wheezes.     GASTROINTESTINAL: Normal bowel sounds.  No masses or tenderness     EXTREMITIES: No clubbing, cyanosis, or edma       LABS:    11-24    140  |  99  |  35<H>  ----------------------------<  135<H>  4.2   |  26  |  0.93    Ca    9.5      24 Nov 2019 06:37  Phos  3.5     11-22  Mg     2.1     11-22    TPro  6.6  /  Alb  3.9  /  TBili  0.3  /  DBili  x   /  AST  36  /  ALT  61<H>  /  AlkPhos  189<H>  11-24    ABG - ( 23 Nov 2019 06:31 )  pH, Arterial: 7.48  pH, Blood: x     /  pCO2: 41    /  pO2: 104   / HCO3: 30    / Base Excess: 6.3   /  SaO2: 99        Serum Pro-Brain Natriuretic Peptide: 1347 pg/mL (11-22-19 @ 11:29)  Serum Pro-Brain Natriuretic Peptide: 465 pg/mL (11-21-19 @ 23:38)    MICROBIOLOGY:  Culture Results:   10,000 - 49,000 CFU/mL Escherichia coli (11-22 @ 03:32)  Culture Results:   No growth to date. (11-22 @ 03:30)  Culture Results:   No growth to date. (11-22 @ 03:30)    RADIOLOGY & ADDITIONAL STUDIES:    Assessment:  ?Retained secretions/Exacerbation - Improved this AM  Recent right 7,8 rib fractures  Severe COPD  Now back to near her baseline    Plan:  02 as needed  I reduced her solumedrol to twice daily. Switch to PO shortly.  Pulmonary toilet reviewed. Lidocaine patch will help (OTC on discharge)  She had N/V with Tramadol apparently  Tylenol/Motrin  DVT prophylaxis  GI follow up as outpatient for her pancreatic lesions but doubt we will pursue them given her medical condition  Await echo (Normal in past)  Trelegy/Nebs on discharge  All reviewed with her family at length at her bedside.       Hammad Maier MD, FACP, [facility], [place] [id number] [phone]

## 2019-11-25 LAB
ALBUMIN SERPL ELPH-MCNC: 3.8 G/DL — SIGNIFICANT CHANGE UP (ref 3.3–5)
ALP SERPL-CCNC: 181 U/L — HIGH (ref 40–120)
ALT FLD-CCNC: 62 U/L — HIGH (ref 10–45)
ANION GAP SERPL CALC-SCNC: 15 MMOL/L — SIGNIFICANT CHANGE UP (ref 5–17)
AST SERPL-CCNC: 45 U/L — HIGH (ref 10–40)
BILIRUB DIRECT SERPL-MCNC: 0.1 MG/DL — SIGNIFICANT CHANGE UP (ref 0–0.2)
BILIRUB INDIRECT FLD-MCNC: 0.1 MG/DL — LOW (ref 0.2–1)
BILIRUB SERPL-MCNC: 0.2 MG/DL — SIGNIFICANT CHANGE UP (ref 0.2–1.2)
BUN SERPL-MCNC: 35 MG/DL — HIGH (ref 7–23)
CALCIUM SERPL-MCNC: 9.5 MG/DL — SIGNIFICANT CHANGE UP (ref 8.4–10.5)
CHLORIDE SERPL-SCNC: 100 MMOL/L — SIGNIFICANT CHANGE UP (ref 96–108)
CO2 SERPL-SCNC: 26 MMOL/L — SIGNIFICANT CHANGE UP (ref 22–31)
CREAT SERPL-MCNC: 0.96 MG/DL — SIGNIFICANT CHANGE UP (ref 0.5–1.3)
GLUCOSE SERPL-MCNC: 139 MG/DL — HIGH (ref 70–99)
HCT VFR BLD CALC: 41.2 % — SIGNIFICANT CHANGE UP (ref 34.5–45)
HGB BLD-MCNC: 12.7 G/DL — SIGNIFICANT CHANGE UP (ref 11.5–15.5)
MCHC RBC-ENTMCNC: 30.3 PG — SIGNIFICANT CHANGE UP (ref 27–34)
MCHC RBC-ENTMCNC: 30.8 GM/DL — LOW (ref 32–36)
MCV RBC AUTO: 98.3 FL — SIGNIFICANT CHANGE UP (ref 80–100)
PLATELET # BLD AUTO: 151 K/UL — SIGNIFICANT CHANGE UP (ref 150–400)
POTASSIUM SERPL-MCNC: 5.3 MMOL/L — SIGNIFICANT CHANGE UP (ref 3.5–5.3)
POTASSIUM SERPL-SCNC: 5.3 MMOL/L — SIGNIFICANT CHANGE UP (ref 3.5–5.3)
PROT SERPL-MCNC: 6.5 G/DL — SIGNIFICANT CHANGE UP (ref 6–8.3)
RBC # BLD: 4.19 M/UL — SIGNIFICANT CHANGE UP (ref 3.8–5.2)
RBC # FLD: 13.7 % — SIGNIFICANT CHANGE UP (ref 10.3–14.5)
SODIUM SERPL-SCNC: 141 MMOL/L — SIGNIFICANT CHANGE UP (ref 135–145)
WBC # BLD: 7.58 K/UL — SIGNIFICANT CHANGE UP (ref 3.8–10.5)
WBC # FLD AUTO: 7.58 K/UL — SIGNIFICANT CHANGE UP (ref 3.8–10.5)

## 2019-11-25 PROCEDURE — 93306 TTE W/DOPPLER COMPLETE: CPT | Mod: 26

## 2019-11-25 RX ORDER — ALBUTEROL 90 UG/1
3 AEROSOL, METERED ORAL
Qty: 0 | Refills: 0 | DISCHARGE

## 2019-11-25 RX ORDER — OMEPRAZOLE 10 MG/1
1 CAPSULE, DELAYED RELEASE ORAL
Qty: 0 | Refills: 0 | DISCHARGE

## 2019-11-25 RX ORDER — FLUTICASONE FUROATE, UMECLIDINIUM BROMIDE AND VILANTEROL TRIFENATATE 200; 62.5; 25 UG/1; UG/1; UG/1
1 POWDER RESPIRATORY (INHALATION)
Qty: 0 | Refills: 0 | DISCHARGE

## 2019-11-25 RX ORDER — AMLODIPINE BESYLATE 2.5 MG/1
5 TABLET ORAL DAILY
Refills: 0 | Status: DISCONTINUED | OUTPATIENT
Start: 2019-11-25 | End: 2019-11-30

## 2019-11-25 RX ORDER — FUROSEMIDE 40 MG
40 TABLET ORAL ONCE
Refills: 0 | Status: COMPLETED | OUTPATIENT
Start: 2019-11-25 | End: 2019-11-25

## 2019-11-25 RX ADMIN — Medication 650 MILLIGRAM(S): at 18:51

## 2019-11-25 RX ADMIN — Medication 650 MILLIGRAM(S): at 18:10

## 2019-11-25 RX ADMIN — LIDOCAINE 1 PATCH: 4 CREAM TOPICAL at 23:45

## 2019-11-25 RX ADMIN — AMLODIPINE BESYLATE 5 MILLIGRAM(S): 2.5 TABLET ORAL at 10:55

## 2019-11-25 RX ADMIN — Medication 40 MILLIGRAM(S): at 05:11

## 2019-11-25 RX ADMIN — BUDESONIDE AND FORMOTEROL FUMARATE DIHYDRATE 2 PUFF(S): 160; 4.5 AEROSOL RESPIRATORY (INHALATION) at 05:25

## 2019-11-25 RX ADMIN — ENOXAPARIN SODIUM 30 MILLIGRAM(S): 100 INJECTION SUBCUTANEOUS at 12:01

## 2019-11-25 RX ADMIN — LIDOCAINE 1 PATCH: 4 CREAM TOPICAL at 11:59

## 2019-11-25 RX ADMIN — SENNA PLUS 2 TABLET(S): 8.6 TABLET ORAL at 20:58

## 2019-11-25 RX ADMIN — Medication 1 SPRAY(S): at 05:11

## 2019-11-25 RX ADMIN — Medication 3 MILLILITER(S): at 18:09

## 2019-11-25 RX ADMIN — Medication 3 MILLILITER(S): at 00:54

## 2019-11-25 RX ADMIN — Medication 3 MILLILITER(S): at 12:00

## 2019-11-25 RX ADMIN — PANTOPRAZOLE SODIUM 40 MILLIGRAM(S): 20 TABLET, DELAYED RELEASE ORAL at 05:11

## 2019-11-25 RX ADMIN — PANTOPRAZOLE SODIUM 40 MILLIGRAM(S): 20 TABLET, DELAYED RELEASE ORAL at 18:09

## 2019-11-25 RX ADMIN — Medication 650 MILLIGRAM(S): at 12:35

## 2019-11-25 RX ADMIN — LIDOCAINE 1 PATCH: 4 CREAM TOPICAL at 19:30

## 2019-11-25 RX ADMIN — Medication 40 MILLIGRAM(S): at 18:08

## 2019-11-25 RX ADMIN — Medication 1 SPRAY(S): at 18:09

## 2019-11-25 RX ADMIN — Medication 650 MILLIGRAM(S): at 12:00

## 2019-11-25 RX ADMIN — Medication 40 MILLIGRAM(S): at 10:55

## 2019-11-25 RX ADMIN — BUDESONIDE AND FORMOTEROL FUMARATE DIHYDRATE 2 PUFF(S): 160; 4.5 AEROSOL RESPIRATORY (INHALATION) at 18:08

## 2019-11-25 RX ADMIN — Medication 3 MILLILITER(S): at 05:11

## 2019-11-25 NOTE — PROGRESS NOTE ADULT - SUBJECTIVE AND OBJECTIVE BOX
Patient is a 79y old  Female who presents with a chief complaint of SOB (2019 09:10)                                                               INTERVAL HPI/OVERNIGHT EVENTS:    REVIEW OF SYSTEMS:     CONSTITUTIONAL: No weakness, fevers or chills  RESPIRATORY: mild shortness of breath  CARDIOVASCULAR: No chest pain or palpitations  GASTROINTESTINAL: No abdominal pain  . No nausea, vomiting, or hematemesis; No diarrhea or constipation. No melena or hematochezia.  GENITOURINARY: No dysuria, frequency or hematuria  NEUROLOGICAL: No numbness or weakness                                                                                                                                                                                                                                                                        Medications:  MEDICATIONS  (STANDING):  albuterol/ipratropium for Nebulization 3 milliLiter(s) Nebulizer every 6 hours  amLODIPine   Tablet 5 milliGRAM(s) Oral daily  budesonide 160 MICROgram(s)/formoterol 4.5 MICROgram(s) Inhaler 2 Puff(s) Inhalation two times a day  enoxaparin Injectable 30 milliGRAM(s) SubCutaneous daily  lidocaine   Patch 1 Patch Transdermal daily  methylPREDNISolone sodium succinate Injectable 40 milliGRAM(s) IV Push two times a day  pantoprazole    Tablet 40 milliGRAM(s) Oral two times a day  polyethylene glycol 3350 17 Gram(s) Oral two times a day  senna 2 Tablet(s) Oral at bedtime  sodium chloride 0.65% Nasal 1 Spray(s) Both Nostrils two times a day    MEDICATIONS  (PRN):  acetaminophen   Tablet .. 650 milliGRAM(s) Oral every 6 hours PRN Mild Pain (1 - 3), Moderate Pain (4 - 6)  morphine  - Injectable 1 milliGRAM(s) IV Push every 6 hours PRN for breakthrough pain not responding to other meds  ondansetron Injectable 4 milliGRAM(s) IV Push every 8 hours PRN Nausea and/or Vomitinga  traMADol 25 milliGRAM(s) Oral every 6 hours PRN Severe Pain (7 - 10)       Allergies    No Known Allergies    Intolerances      Vital Signs Last 24 Hrs  T(C): 36.6 (2019 13:19), Max: 36.7 (2019 20:37)  T(F): 97.8 (2019 13:19), Max: 98.1 (2019 20:37)  HR: 87 (2019 13:19) (68 - 93)  BP: 116/56 (2019 13:19) (116/56 - 176/72)  BP(mean): --  RR: 20 (2019 13:19) (18 - 20)  SpO2: 93% (2019 13:19) (91% - 96%)  CAPILLARY BLOOD GLUCOSE           @ 07:  -   @ 07:00  --------------------------------------------------------  IN: 1080 mL / OUT: 1500 mL / NET: -420 mL     @ 07:  -   @ 13:37  --------------------------------------------------------  IN: 1017 mL / OUT: 1400 mL / NET: -383 mL      Physical Exam:    Daily     Daily Weight in k.6 (2019 07:15)  General:  mild sob   HEENT:  Nonicteric, PERRLA  CV:  RRR, S1S2   Lungs:  poor airentry otherwise no wheezing   Abdomen:  Soft, non-tender, no distended, positive BS  Extremities:  2+ pulses, no c/c, no edema  Skin:  Warm and dry, no rashes  :  No dillon  Neuro:  AAOx3, non-focal, grossly intact                                                                                                                                                                                                                                                                                                LABS:                               12.7   7.58  )-----------( 151      ( 2019 07:52 )             41.2                          141  |  100  |  35<H>  ----------------------------<  139<H>  5.3   |  26  |  0.96    Ca    9.5      2019 06:38    TPro  6.5  /  Alb  3.8  /  TBili  0.2  /  DBili  0.1  /  AST  45<H>  /  ALT  62<H>  /  AlkPhos  181<H>

## 2019-11-25 NOTE — PHARMACOTHERAPY INTERVENTION NOTE - COMMENTS
Pharmacy student Juan Stack confirmed home medications with patient and pharmacy, updated in Outpatient Medication Review.     Arin Hogan, PharmD   [phone]

## 2019-11-25 NOTE — PROGRESS NOTE ADULT - SUBJECTIVE AND OBJECTIVE BOX
S: Denies chest pain or SOB. Review of systems otherwise (-)      MEDICATIONS  (STANDING):  albuterol/ipratropium for Nebulization 3 milliLiter(s) Nebulizer every 6 hours  amLODIPine   Tablet 5 milliGRAM(s) Oral daily  budesonide 160 MICROgram(s)/formoterol 4.5 MICROgram(s) Inhaler 2 Puff(s) Inhalation two times a day  enoxaparin Injectable 30 milliGRAM(s) SubCutaneous daily  lidocaine   Patch 1 Patch Transdermal daily  methylPREDNISolone sodium succinate Injectable 40 milliGRAM(s) IV Push two times a day  pantoprazole    Tablet 40 milliGRAM(s) Oral two times a day  polyethylene glycol 3350 17 Gram(s) Oral two times a day  senna 2 Tablet(s) Oral at bedtime  sodium chloride 0.65% Nasal 1 Spray(s) Both Nostrils two times a day    MEDICATIONS  (PRN):  acetaminophen   Tablet .. 650 milliGRAM(s) Oral every 6 hours PRN Mild Pain (1 - 3), Moderate Pain (4 - 6)  morphine  - Injectable 1 milliGRAM(s) IV Push every 6 hours PRN for breakthrough pain not responding to other meds  ondansetron Injectable 4 milliGRAM(s) IV Push every 8 hours PRN Nausea and/or Vomitinga  traMADol 25 milliGRAM(s) Oral every 6 hours PRN Severe Pain (7 - 10)      LABS:                            12.7   7.58  )-----------( 151      ( 25 Nov 2019 07:52 )             41.2     Hemoglobin: 12.7 g/dL (11-25 @ 07:52)  Hemoglobin: 13.0 g/dL (11-21 @ 23:38)    11-25    141  |  100  |  35<H>  ----------------------------<  139<H>  5.3   |  26  |  0.96    Ca    9.5      25 Nov 2019 06:38    TPro  6.5  /  Alb  3.8  /  TBili  0.2  /  DBili  0.1  /  AST  45<H>  /  ALT  62<H>  /  AlkPhos  181<H>  11-25    Creatinine Trend: 0.96<--, 0.93<--, 0.91<--, 0.84<--, 0.85<--, 1.17<--             11-24-19 @ 07:01  -  11-25-19 @ 07:00  --------------------------------------------------------  IN: 1080 mL / OUT: 1500 mL / NET: -420 mL    11-25-19 @ 07:01  -  11-25-19 @ 13:28  --------------------------------------------------------  IN: 1017 mL / OUT: 1400 mL / NET: -383 mL        PHYSICAL EXAM  Vital Signs Last 24 Hrs  T(C): 36.6 (25 Nov 2019 13:19), Max: 36.7 (24 Nov 2019 20:37)  T(F): 97.8 (25 Nov 2019 13:19), Max: 98.1 (24 Nov 2019 20:37)  HR: 87 (25 Nov 2019 13:19) (68 - 93)  BP: 116/56 (25 Nov 2019 13:19) (116/56 - 176/72)  BP(mean): --  RR: 20 (25 Nov 2019 13:19) (18 - 20)  SpO2: 93% (25 Nov 2019 13:19) (91% - 96%)    Gen: Appears well in NAD  HEENT:  (-)icterus (-)pallor  CV: N S1 S2 1/6 MARIE (+)2 Pulses B/l  Resp:  Clear to auscultation B/L, normal effort  GI: (+) BS Soft, NT, ND  Lymph:  (-)Edema, (-)obvious lymphadenopathy  Skin: Warm to touch, Normal turgor  Psych: Appropriate mood and affect      TELEMETRY: SR 60-90, PACs       ASSESSMENT/PLAN: 80 y/o Nigerien speaking female with PMHx of COPD on Home O2 recently admitted s/p mechanical fall with Right 7 and 8th rib fractures now presents with shortness of breath admitted with COPD exacerbation.    - No evidence of clinical HF or anginal symptoms  - Tele remains stable  - Pulm f/u - on IV steroids  - Check TTE to eval LV/RV function  - If TTE normal then no further inpatient cardiac w/u needed    Dirk Olsen PA-C  [facility]  Pager: [phone]

## 2019-11-25 NOTE — PROGRESS NOTE ADULT - ASSESSMENT
80 y/o female with hx of COPD on Home O2  discharged two days ago when she was admitted s/p mechanical fall resulting in fracture of R 7 and 8 th ribs.. was admitted to SICU for observation and was hemodynmaically stable... during that hospitalization she was found to have a liver lesion : w/U with CT and MRI showed fatty infiltration .. 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 pt was discharged home and now presenting with SOB /N/V.  reports worsening SOB from her baseline... pain in chest at the site of fracture has not changed.. +productive cough of yellow sputum.. no fever or chills ..  had two episodes of N/V NB  no abd pain.  no BM for 6 days  no urianry sx   C/O BLE pain in calf : no swelling at this time   no neuro complaints   labs : neg trop *2  EKG : new LBBB compared to 2016    RVP neg   EKG CTA neg     - acute hpoxic respiratory failure sec to COPD exacerrbation:    improved with BIPAP and now on NC   cont  nebs and steroids  pul f/u appreciated   pt seems mildly w labored breathing this AM : will give one dose of lasix for possible element of chf     - LE pain : Duplex neg for dvt     - New EKG findings with LBB .. dicussed with  : changes are old   Echo with NF few months ago .. will recheck Echo     - N/V: tolerating po   monitor and add prn zofran     - constipation : add bowel regimen     - rib Fx : IS and monitor   pain meds     - elevated LFT :  frannie sec to liver infiltration reported during last admit   monitor for now

## 2019-11-25 NOTE — PROGRESS NOTE ADULT - SUBJECTIVE AND OBJECTIVE BOX
PULMONARY/OUTPATIENT MEDICINE PROGRESS NOTE:    INTERVAL HPI: Alert in bed. No new complaints. Record reviewed. Discussed with NP and nursing. Minimal congestion.    MEDICATIONS  (STANDING):  albuterol/ipratropium for Nebulization 3 milliLiter(s) Nebulizer every 6 hours  budesonide 160 MICROgram(s)/formoterol 4.5 MICROgram(s) Inhaler 2 Puff(s) Inhalation two times a day  enoxaparin Injectable 30 milliGRAM(s) SubCutaneous daily  lidocaine   Patch 1 Patch Transdermal daily  methylPREDNISolone sodium succinate Injectable 40 milliGRAM(s) IV Push two times a day  pantoprazole    Tablet 40 milliGRAM(s) Oral two times a day  polyethylene glycol 3350 17 Gram(s) Oral two times a day  senna 2 Tablet(s) Oral at bedtime  sodium chloride 0.65% Nasal 1 Spray(s) Both Nostrils two times a day    MEDICATIONS  (PRN):  acetaminophen   Tablet .. 650 milliGRAM(s) Oral every 6 hours PRN Mild Pain (1 - 3), Moderate Pain (4 - 6)  morphine  - Injectable 1 milliGRAM(s) IV Push every 6 hours PRN for breakthrough pain not responding to other meds  ondansetron Injectable 4 milliGRAM(s) IV Push every 8 hours PRN Nausea and/or Vomitinga  traMADol 25 milliGRAM(s) Oral every 6 hours PRN Severe Pain (7 - 10)    No Known Allergies    REVIEW OF SYSTEMS:  CONSTITUTIONAL:  Negative for fever, chills, or night sweats  CARDIOVASCULAR:  Negative for chest pain or palpitations    RESPIRATORY:  Negative for cough or wheezing   GASTROINTESTINAL:  Negative for nausea, vomiting, diarrhea, or constipation         Vital Signs Last 24 Hrs  T(C): 36.6 (25 Nov 2019 07:45), Max: 36.8 (24 Nov 2019 13:26)  T(F): 97.8 (25 Nov 2019 07:45), Max: 98.2 (24 Nov 2019 13:26)  HR: 78 (25 Nov 2019 07:45) (68 - 89)  BP: 158/86 (25 Nov 2019 07:45) (144/68 - 165/73)  BP(mean): --  RR: 20 (25 Nov 2019 07:45) (18 - 20)  SpO2: 94% (25 Nov 2019 07:45) (93% - 96%)  I&O's Summary    24 Nov 2019 07:01  -  25 Nov 2019 07:00  --------------------------------------------------------  IN: 1080 mL / OUT: 1500 mL / NET: -420 mL      GENERAL: Appears comfortable  NECK: Supple,  without JVD, bruit, adenopathy, or thyromegaly  CARDIOVASCULAR: Normal rate/rhythm. No murmurs  RESPIRATORY: Normal breath sounds without rales, rhonchi, or wheezes.     GASTROINTESTINAL: Normal bowel sounds.  No masses or tenderness     EXTREMITIES: No clubbing, cyanosis, or edma       LABS:                        12.7   7.58  )-----------( 151      ( 25 Nov 2019 07:52 )             41.2     11-25    141  |  100  |  35<H>  ----------------------------<  139<H>  5.3   |  26  |  0.96    Ca    9.5      25 Nov 2019 06:38    TPro  6.6  /  Alb  3.9  /  TBili  0.3  /  DBili  x   /  AST  36  /  ALT  61<H>  /  AlkPhos  189<H>  11-24    Serum Pro-Brain Natriuretic Peptide: 1347 pg/mL (11-22-19 @ 11:29)    MICROBIOLOGY:  Culture Results:   10,000 - 49,000 CFU/mL Escherichia coli (11-22 @ 03:32)  Culture Results:   No growth to date. (11-22 @ 03:30)  Culture Results:   No growth to date. (11-22 @ 03:30)    RADIOLOGY & ADDITIONAL STUDIES: Noted    Assessment:  ?Retained secretions/Exacerbation - Improved   Recent right 7,8 rib fractures  Severe COPD  Now back to near her baseline    Plan:  02 as needed  PO Prednisone soon  Pulmonary toilet reviewed. Lidocaine patch will help (OTC on discharge)  She had N/V with Tramadol apparently  Tylenol/Motrin  DVT prophylaxis  GI follow up as outpatient for her pancreatic lesions but doubt we will pursue them given her medical condition  Await echo (Normal in past) - Results pending  Trelegy/Nebs on discharge  ?Home    Hammad Maier MD, FACP, [facility]  [facility], [address] [address] [phone]

## 2019-11-26 LAB
ALBUMIN SERPL ELPH-MCNC: 4 G/DL — SIGNIFICANT CHANGE UP (ref 3.3–5)
ALP SERPL-CCNC: 177 U/L — HIGH (ref 40–120)
ALT FLD-CCNC: 62 U/L — HIGH (ref 10–45)
ANION GAP SERPL CALC-SCNC: 13 MMOL/L — SIGNIFICANT CHANGE UP (ref 5–17)
AST SERPL-CCNC: 29 U/L — SIGNIFICANT CHANGE UP (ref 10–40)
BILIRUB SERPL-MCNC: 0.3 MG/DL — SIGNIFICANT CHANGE UP (ref 0.2–1.2)
BUN SERPL-MCNC: 39 MG/DL — HIGH (ref 7–23)
CALCIUM SERPL-MCNC: 9.5 MG/DL — SIGNIFICANT CHANGE UP (ref 8.4–10.5)
CHLORIDE SERPL-SCNC: 97 MMOL/L — SIGNIFICANT CHANGE UP (ref 96–108)
CO2 SERPL-SCNC: 29 MMOL/L — SIGNIFICANT CHANGE UP (ref 22–31)
CREAT SERPL-MCNC: 0.93 MG/DL — SIGNIFICANT CHANGE UP (ref 0.5–1.3)
GLUCOSE SERPL-MCNC: 140 MG/DL — HIGH (ref 70–99)
HCT VFR BLD CALC: 42.8 % — SIGNIFICANT CHANGE UP (ref 34.5–45)
HGB BLD-MCNC: 13.5 G/DL — SIGNIFICANT CHANGE UP (ref 11.5–15.5)
MAGNESIUM SERPL-MCNC: 2.4 MG/DL — SIGNIFICANT CHANGE UP (ref 1.6–2.6)
MCHC RBC-ENTMCNC: 30 PG — SIGNIFICANT CHANGE UP (ref 27–34)
MCHC RBC-ENTMCNC: 31.5 GM/DL — LOW (ref 32–36)
MCV RBC AUTO: 95.1 FL — SIGNIFICANT CHANGE UP (ref 80–100)
PLATELET # BLD AUTO: 177 K/UL — SIGNIFICANT CHANGE UP (ref 150–400)
POTASSIUM SERPL-MCNC: 4.4 MMOL/L — SIGNIFICANT CHANGE UP (ref 3.5–5.3)
POTASSIUM SERPL-SCNC: 4.4 MMOL/L — SIGNIFICANT CHANGE UP (ref 3.5–5.3)
PROT SERPL-MCNC: 6.7 G/DL — SIGNIFICANT CHANGE UP (ref 6–8.3)
RBC # BLD: 4.5 M/UL — SIGNIFICANT CHANGE UP (ref 3.8–5.2)
RBC # FLD: 13.5 % — SIGNIFICANT CHANGE UP (ref 10.3–14.5)
SODIUM SERPL-SCNC: 139 MMOL/L — SIGNIFICANT CHANGE UP (ref 135–145)
WBC # BLD: 7.94 K/UL — SIGNIFICANT CHANGE UP (ref 3.8–10.5)
WBC # FLD AUTO: 7.94 K/UL — SIGNIFICANT CHANGE UP (ref 3.8–10.5)

## 2019-11-26 RX ADMIN — ENOXAPARIN SODIUM 30 MILLIGRAM(S): 100 INJECTION SUBCUTANEOUS at 12:43

## 2019-11-26 RX ADMIN — TRAMADOL HYDROCHLORIDE 25 MILLIGRAM(S): 50 TABLET ORAL at 23:30

## 2019-11-26 RX ADMIN — MORPHINE SULFATE 1 MILLIGRAM(S): 50 CAPSULE, EXTENDED RELEASE ORAL at 01:54

## 2019-11-26 RX ADMIN — LIDOCAINE 1 PATCH: 4 CREAM TOPICAL at 19:10

## 2019-11-26 RX ADMIN — Medication 3 MILLILITER(S): at 12:42

## 2019-11-26 RX ADMIN — Medication 3 MILLILITER(S): at 05:20

## 2019-11-26 RX ADMIN — LIDOCAINE 1 PATCH: 4 CREAM TOPICAL at 12:42

## 2019-11-26 RX ADMIN — Medication 40 MILLIGRAM(S): at 05:20

## 2019-11-26 RX ADMIN — SENNA PLUS 2 TABLET(S): 8.6 TABLET ORAL at 21:17

## 2019-11-26 RX ADMIN — Medication 1 SPRAY(S): at 17:12

## 2019-11-26 RX ADMIN — Medication 3 MILLILITER(S): at 17:48

## 2019-11-26 RX ADMIN — BUDESONIDE AND FORMOTEROL FUMARATE DIHYDRATE 2 PUFF(S): 160; 4.5 AEROSOL RESPIRATORY (INHALATION) at 05:20

## 2019-11-26 RX ADMIN — PANTOPRAZOLE SODIUM 40 MILLIGRAM(S): 20 TABLET, DELAYED RELEASE ORAL at 06:12

## 2019-11-26 RX ADMIN — AMLODIPINE BESYLATE 5 MILLIGRAM(S): 2.5 TABLET ORAL at 05:19

## 2019-11-26 RX ADMIN — PANTOPRAZOLE SODIUM 40 MILLIGRAM(S): 20 TABLET, DELAYED RELEASE ORAL at 17:10

## 2019-11-26 RX ADMIN — MORPHINE SULFATE 1 MILLIGRAM(S): 50 CAPSULE, EXTENDED RELEASE ORAL at 02:30

## 2019-11-26 RX ADMIN — TRAMADOL HYDROCHLORIDE 25 MILLIGRAM(S): 50 TABLET ORAL at 23:01

## 2019-11-26 RX ADMIN — BUDESONIDE AND FORMOTEROL FUMARATE DIHYDRATE 2 PUFF(S): 160; 4.5 AEROSOL RESPIRATORY (INHALATION) at 17:10

## 2019-11-26 RX ADMIN — Medication 3 MILLILITER(S): at 21:18

## 2019-11-26 RX ADMIN — Medication 1 SPRAY(S): at 05:19

## 2019-11-26 RX ADMIN — Medication 40 MILLIGRAM(S): at 17:13

## 2019-11-26 NOTE — PROGRESS NOTE ADULT - SUBJECTIVE AND OBJECTIVE BOX
S: SOB improving. Denies chest pain. Review of systems otherwise (-)      MEDICATIONS  (STANDING):  albuterol/ipratropium for Nebulization 3 milliLiter(s) Nebulizer every 6 hours  amLODIPine   Tablet 5 milliGRAM(s) Oral daily  budesonide 160 MICROgram(s)/formoterol 4.5 MICROgram(s) Inhaler 2 Puff(s) Inhalation two times a day  enoxaparin Injectable 30 milliGRAM(s) SubCutaneous daily  lidocaine   Patch 1 Patch Transdermal daily  methylPREDNISolone sodium succinate Injectable 40 milliGRAM(s) IV Push two times a day  pantoprazole    Tablet 40 milliGRAM(s) Oral two times a day  polyethylene glycol 3350 17 Gram(s) Oral two times a day  senna 2 Tablet(s) Oral at bedtime  sodium chloride 0.65% Nasal 1 Spray(s) Both Nostrils two times a day    MEDICATIONS  (PRN):  acetaminophen   Tablet .. 650 milliGRAM(s) Oral every 6 hours PRN Mild Pain (1 - 3), Moderate Pain (4 - 6)  bisacodyl Suppository 10 milliGRAM(s) Rectal daily PRN Constipation  morphine  - Injectable 1 milliGRAM(s) IV Push every 6 hours PRN for breakthrough pain not responding to other meds  ondansetron Injectable 4 milliGRAM(s) IV Push every 8 hours PRN Nausea and/or Vomitinga  traMADol 25 milliGRAM(s) Oral every 6 hours PRN Severe Pain (7 - 10)      LABS:                            13.5   7.94  )-----------( 177      ( 26 Nov 2019 08:41 )             42.8     Hemoglobin: 13.5 g/dL (11-26 @ 08:41)  Hemoglobin: 12.7 g/dL (11-25 @ 07:52)  Hemoglobin: 13.0 g/dL (11-21 @ 23:38)    11-26    139  |  97  |  39<H>  ----------------------------<  140<H>  4.4   |  29  |  0.93    Ca    9.5      26 Nov 2019 07:03  Mg     2.4     11-26    TPro  6.7  /  Alb  4.0  /  TBili  0.3  /  DBili  x   /  AST  29  /  ALT  62<H>  /  AlkPhos  177<H>  11-26    Creatinine Trend: 0.93<--, 0.96<--, 0.93<--, 0.91<--, 0.84<--, 0.85<--             11-25-19 @ 07:01  -  11-26-19 @ 07:00  --------------------------------------------------------  IN: 1197 mL / OUT: 1750 mL / NET: -553 mL    11-26-19 @ 07:01  -  11-26-19 @ 11:46  --------------------------------------------------------  IN: 240 mL / OUT: 0 mL / NET: 240 mL        PHYSICAL EXAM  Vital Signs Last 24 Hrs  T(C): 36.6 (26 Nov 2019 04:58), Max: 36.6 (25 Nov 2019 13:19)  T(F): 97.8 (26 Nov 2019 04:58), Max: 97.8 (25 Nov 2019 13:19)  HR: 72 (26 Nov 2019 06:11) (62 - 87)  BP: 127/64 (26 Nov 2019 04:58) (116/56 - 137/67)  BP(mean): --  RR: 18 (26 Nov 2019 04:58) (18 - 20)  SpO2: 92% (26 Nov 2019 06:11) (92% - 96%)      Gen: Appears well in NAD  HEENT:  (-)icterus (-)pallor  CV: N S1 S2 1/6 MARIE (+)2 Pulses B/l  Resp:  Clear to auscultation B/L, normal effort  GI: (+) BS Soft, NT, ND  Lymph:  (-)Edema, (-)obvious lymphadenopathy  Skin: Warm to touch, Normal turgor  Psych: Appropriate mood and affect      TELEMETRY: SR 60-70s, PAT    < from: Transthoracic Echocardiogram (11.25.19 @ 06:51) >  Conclusions:  1. Endocardium not well visualized; There  is mild global  hypokinesis with minor regional variation and  dyssynchronous left ventricular contraction.  2. Normal right ventricular size with decreased right  ventricular systolic function.  3. Normal tricuspid valve. Mild tricuspid regurgitation.  4. Estimated pulmonary artery systolic pressure equals 57  mm Hg, assuming right atrial pressure equals 8 mm Hg,  consistent with moderate pulmonary pressures.  5. Thickened pericardium.  *** No previous Echo exam.    < end of copied text >       ASSESSMENT/PLAN: 80 y/o Serbian speaking female with PMHx of COPD on Home O2 recently admitted s/p mechanical fall with Right 7 and 8th rib fractures now presents with shortness of breath admitted with COPD exacerbation.    - Tele remains stable  - Pulm f/u - on IV steroids  - Abnormal TTE noted above  - Will check Pharm NST given abnormal echo findings - patient/family agreeable    Dirk Olsen PA-C  [facility]  Pager: [phone] S: SOB improving. Denies chest pain. Review of systems otherwise (-)      MEDICATIONS  (STANDING):  albuterol/ipratropium for Nebulization 3 milliLiter(s) Nebulizer every 6 hours  amLODIPine   Tablet 5 milliGRAM(s) Oral daily  budesonide 160 MICROgram(s)/formoterol 4.5 MICROgram(s) Inhaler 2 Puff(s) Inhalation two times a day  enoxaparin Injectable 30 milliGRAM(s) SubCutaneous daily  lidocaine   Patch 1 Patch Transdermal daily  methylPREDNISolone sodium succinate Injectable 40 milliGRAM(s) IV Push two times a day  pantoprazole    Tablet 40 milliGRAM(s) Oral two times a day  polyethylene glycol 3350 17 Gram(s) Oral two times a day  senna 2 Tablet(s) Oral at bedtime  sodium chloride 0.65% Nasal 1 Spray(s) Both Nostrils two times a day    MEDICATIONS  (PRN):  acetaminophen   Tablet .. 650 milliGRAM(s) Oral every 6 hours PRN Mild Pain (1 - 3), Moderate Pain (4 - 6)  bisacodyl Suppository 10 milliGRAM(s) Rectal daily PRN Constipation  morphine  - Injectable 1 milliGRAM(s) IV Push every 6 hours PRN for breakthrough pain not responding to other meds  ondansetron Injectable 4 milliGRAM(s) IV Push every 8 hours PRN Nausea and/or Vomitinga  traMADol 25 milliGRAM(s) Oral every 6 hours PRN Severe Pain (7 - 10)      LABS:                            13.5   7.94  )-----------( 177      ( 26 Nov 2019 08:41 )             42.8     Hemoglobin: 13.5 g/dL (11-26 @ 08:41)  Hemoglobin: 12.7 g/dL (11-25 @ 07:52)  Hemoglobin: 13.0 g/dL (11-21 @ 23:38)    11-26    139  |  97  |  39<H>  ----------------------------<  140<H>  4.4   |  29  |  0.93    Ca    9.5      26 Nov 2019 07:03  Mg     2.4     11-26    TPro  6.7  /  Alb  4.0  /  TBili  0.3  /  DBili  x   /  AST  29  /  ALT  62<H>  /  AlkPhos  177<H>  11-26    Creatinine Trend: 0.93<--, 0.96<--, 0.93<--, 0.91<--, 0.84<--, 0.85<--             11-25-19 @ 07:01  -  11-26-19 @ 07:00  --------------------------------------------------------  IN: 1197 mL / OUT: 1750 mL / NET: -553 mL    11-26-19 @ 07:01  -  11-26-19 @ 11:46  --------------------------------------------------------  IN: 240 mL / OUT: 0 mL / NET: 240 mL        PHYSICAL EXAM  Vital Signs Last 24 Hrs  T(C): 36.6 (26 Nov 2019 04:58), Max: 36.6 (25 Nov 2019 13:19)  T(F): 97.8 (26 Nov 2019 04:58), Max: 97.8 (25 Nov 2019 13:19)  HR: 72 (26 Nov 2019 06:11) (62 - 87)  BP: 127/64 (26 Nov 2019 04:58) (116/56 - 137/67)  BP(mean): --  RR: 18 (26 Nov 2019 04:58) (18 - 20)  SpO2: 92% (26 Nov 2019 06:11) (92% - 96%)      Gen: Appears well in NAD  HEENT:  (-)icterus (-)pallor  CV: N S1 S2 1/6 MARIE (+)2 Pulses B/l  Resp:  Clear to auscultation B/L, normal effort  GI: (+) BS Soft, NT, ND  Lymph:  (-)Edema, (-)obvious lymphadenopathy  Skin: Warm to touch, Normal turgor  Psych: Appropriate mood and affect      TELEMETRY: SR 60-70s, PAT    < from: Transthoracic Echocardiogram (11.25.19 @ 06:51) >  Conclusions:  1. Endocardium not well visualized; There  is mild global  hypokinesis with minor regional variation and  dyssynchronous left ventricular contraction.  2. Normal right ventricular size with decreased right  ventricular systolic function.  3. Normal tricuspid valve. Mild tricuspid regurgitation.  4. Estimated pulmonary artery systolic pressure equals 57  mm Hg, assuming right atrial pressure equals 8 mm Hg,  consistent with moderate pulmonary pressures.  5. Thickened pericardium.  *** No previous Echo exam.    < end of copied text >       ASSESSMENT/PLAN: 80 y/o Belgian speaking female with PMHx of COPD on Home O2 recently admitted s/p mechanical fall with Right 7 and 8th rib fractures now presents with shortness of breath admitted with COPD exacerbation.    - Tele remains stable  - Pulm f/u - on IV steroids  - Abnormal TTE noted above  - Will check Pharm NST given abnormal echo findings when optimized by pulm - patient/family agreeable    Dirk Olsen PA-C  [facility]  Pager: [phone]

## 2019-11-26 NOTE — PROGRESS NOTE ADULT - SUBJECTIVE AND OBJECTIVE BOX
Patient is a 79y old  Female who presents with a chief complaint of SOB (2019 09:10)                                                               INTERVAL HPI/OVERNIGHT EVENTS:    REVIEW OF SYSTEMS:     CONSTITUTIONAL: No weakness, fevers or chills  EYES/ENT: No visual changes , no ear ache   NECK: No pain or stiffness  RESPIRATORY: improving shortness of breath  CARDIOVASCULAR: No chest pain or palpitations  GASTROINTESTINAL: No abdominal pain  . No nausea, vomiting, or hematemesis; No diarrhea or constipation. No melena or hematochezia.  GENITOURINARY: No dysuria, frequency or hematuria  NEUROLOGICAL: No numbness or weakness                                                                                                                                                                                                                                                                                 Medications:  MEDICATIONS  (STANDING):  albuterol/ipratropium for Nebulization 3 milliLiter(s) Nebulizer every 6 hours  amLODIPine   Tablet 5 milliGRAM(s) Oral daily  budesonide 160 MICROgram(s)/formoterol 4.5 MICROgram(s) Inhaler 2 Puff(s) Inhalation two times a day  enoxaparin Injectable 30 milliGRAM(s) SubCutaneous daily  lidocaine   Patch 1 Patch Transdermal daily  methylPREDNISolone sodium succinate Injectable 40 milliGRAM(s) IV Push two times a day  pantoprazole    Tablet 40 milliGRAM(s) Oral two times a day  polyethylene glycol 3350 17 Gram(s) Oral two times a day  senna 2 Tablet(s) Oral at bedtime  sodium chloride 0.65% Nasal 1 Spray(s) Both Nostrils two times a day    MEDICATIONS  (PRN):  acetaminophen   Tablet .. 650 milliGRAM(s) Oral every 6 hours PRN Mild Pain (1 - 3), Moderate Pain (4 - 6)  bisacodyl Suppository 10 milliGRAM(s) Rectal daily PRN Constipation  morphine  - Injectable 1 milliGRAM(s) IV Push every 6 hours PRN for breakthrough pain not responding to other meds  ondansetron Injectable 4 milliGRAM(s) IV Push every 8 hours PRN Nausea and/or Vomitinga  traMADol 25 milliGRAM(s) Oral every 6 hours PRN Severe Pain (7 - 10)       Allergies    No Known Allergies    Intolerances      Vital Signs Last 24 Hrs  T(C): 36.6 (2019 13:40), Max: 36.6 (2019 20:20)  T(F): 97.9 (2019 13:40), Max: 97.9 (2019 13:40)  HR: 72 (2019 13:40) (62 - 87)  BP: 123/64 (2019 13:40) (123/64 - 137/67)  BP(mean): --  RR: 18 (2019 13:40) (18 - 20)  SpO2: 95% (2019 13:40) (92% - 96%)  CAPILLARY BLOOD GLUCOSE           @ 07:  -   @ 07:00  --------------------------------------------------------  IN: 1197 mL / OUT: 1750 mL / NET: -553 mL     @ 07: @ 19:55  --------------------------------------------------------  IN: 520 mL / OUT: 1 mL / NET: 519 mL      Physical Exam:    Daily     Daily Weight in k.1 (2019 07:15)  General:  NAD   HEENT:  Nonicteric, PERRLA  CV:  RRR, S1S2   Lungs: poor airentry otherwise no wheezing   Abdomen:  Soft, non-tender, no distended, positive BS  Extremities:  2+ pulses, no c/c, no edema  Skin:  Warm and dry, no rashes  :  No wilma  Neuro:  AAOx3, non-focal, grossly intact                                                                                                                                                                                                                                                                                                LABS:                               13.5   7.94  )-----------( 177      ( 2019 08:41 )             42.8                          139  |  97  |  39<H>  ----------------------------<  140<H>  4.4   |  29  |  0.93    Ca    9.5      2019 07:03  Mg     2.4         TPro  6.7  /  Alb  4.0  /  TBili  0.3  /  DBili  x   /  AST  29  /  ALT  62<H>  /  AlkPhos  177<H>

## 2019-11-26 NOTE — PROGRESS NOTE ADULT - ASSESSMENT
78 y/o female with hx of COPD on Home O2  discharged two days ago when she was admitted s/p mechanical fall resulting in fracture of R 7 and 8 th ribs.. was admitted to SICU for observation and was hemodynmaically stable... during that hospitalization she was found to have a liver lesion : w/U with CT and MRI showed fatty infiltration .. 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 pt was discharged home and now presenting with SOB /N/V.  reports worsening SOB from her baseline... pain in chest at the site of fracture has not changed.. +productive cough of yellow sputum.. no fever or chills ..  had two episodes of N/V NB  no abd pain.  no BM for 6 days  no urianry sx   C/O BLE pain in calf : no swelling at this time   no neuro complaints   labs : neg trop *2  EKG : new LBBB compared to 2016    RVP neg   EKG CTA neg     - acute hpoxic respiratory failure sec to COPD exacerrbation:    improved with BIPAP and now on NC   cont  nebs and steroids  pul f/u appreciated      - LE pain : Duplex neg for DVT     - New EKG findings with LBB .. discussed with cardio : will proceed w cath...     - N/V: tolerating po   monitor and add prn zofran     - constipation : bowel regimen     - rib Fx : IS and monitor   pain meds     - elevated LFT :  likely sec to liver infiltration reported during last admit   monitor for now       again discussed GOC however pt and daughter still indecisive.. will consult palliative

## 2019-11-27 DIAGNOSIS — Z51.5 ENCOUNTER FOR PALLIATIVE CARE: ICD-10-CM

## 2019-11-27 DIAGNOSIS — K86.9 DISEASE OF PANCREAS, UNSPECIFIED: ICD-10-CM

## 2019-11-27 DIAGNOSIS — Z71.89 OTHER SPECIFIED COUNSELING: ICD-10-CM

## 2019-11-27 DIAGNOSIS — J44.1 CHRONIC OBSTRUCTIVE PULMONARY DISEASE WITH (ACUTE) EXACERBATION: ICD-10-CM

## 2019-11-27 LAB
ALBUMIN SERPL ELPH-MCNC: 3.8 G/DL — SIGNIFICANT CHANGE UP (ref 3.3–5)
ALP SERPL-CCNC: 166 U/L — HIGH (ref 40–120)
ALT FLD-CCNC: 61 U/L — HIGH (ref 10–45)
ANION GAP SERPL CALC-SCNC: 13 MMOL/L — SIGNIFICANT CHANGE UP (ref 5–17)
AST SERPL-CCNC: 29 U/L — SIGNIFICANT CHANGE UP (ref 10–40)
BILIRUB SERPL-MCNC: 0.3 MG/DL — SIGNIFICANT CHANGE UP (ref 0.2–1.2)
BUN SERPL-MCNC: 38 MG/DL — HIGH (ref 7–23)
CALCIUM SERPL-MCNC: 9.5 MG/DL — SIGNIFICANT CHANGE UP (ref 8.4–10.5)
CHLORIDE SERPL-SCNC: 99 MMOL/L — SIGNIFICANT CHANGE UP (ref 96–108)
CO2 SERPL-SCNC: 29 MMOL/L — SIGNIFICANT CHANGE UP (ref 22–31)
CREAT SERPL-MCNC: 1.09 MG/DL — SIGNIFICANT CHANGE UP (ref 0.5–1.3)
CULTURE RESULTS: SIGNIFICANT CHANGE UP
CULTURE RESULTS: SIGNIFICANT CHANGE UP
GLUCOSE SERPL-MCNC: 130 MG/DL — HIGH (ref 70–99)
MAGNESIUM SERPL-MCNC: 2.4 MG/DL — SIGNIFICANT CHANGE UP (ref 1.6–2.6)
POTASSIUM SERPL-MCNC: 4.8 MMOL/L — SIGNIFICANT CHANGE UP (ref 3.5–5.3)
POTASSIUM SERPL-SCNC: 4.8 MMOL/L — SIGNIFICANT CHANGE UP (ref 3.5–5.3)
PROT SERPL-MCNC: 6.1 G/DL — SIGNIFICANT CHANGE UP (ref 6–8.3)
SODIUM SERPL-SCNC: 141 MMOL/L — SIGNIFICANT CHANGE UP (ref 135–145)
SPECIMEN SOURCE: SIGNIFICANT CHANGE UP
SPECIMEN SOURCE: SIGNIFICANT CHANGE UP

## 2019-11-27 PROCEDURE — 99497 ADVNCD CARE PLAN 30 MIN: CPT | Mod: 25

## 2019-11-27 PROCEDURE — 99223 1ST HOSP IP/OBS HIGH 75: CPT

## 2019-11-27 PROCEDURE — 99498 ADVNCD CARE PLAN ADDL 30 MIN: CPT | Mod: 25

## 2019-11-27 PROCEDURE — 93454 CORONARY ARTERY ANGIO S&I: CPT | Mod: 26

## 2019-11-27 RX ADMIN — LIDOCAINE 1 PATCH: 4 CREAM TOPICAL at 00:05

## 2019-11-27 RX ADMIN — BUDESONIDE AND FORMOTEROL FUMARATE DIHYDRATE 2 PUFF(S): 160; 4.5 AEROSOL RESPIRATORY (INHALATION) at 05:10

## 2019-11-27 RX ADMIN — BUDESONIDE AND FORMOTEROL FUMARATE DIHYDRATE 2 PUFF(S): 160; 4.5 AEROSOL RESPIRATORY (INHALATION) at 21:25

## 2019-11-27 RX ADMIN — Medication 40 MILLIGRAM(S): at 14:22

## 2019-11-27 RX ADMIN — AMLODIPINE BESYLATE 5 MILLIGRAM(S): 2.5 TABLET ORAL at 05:10

## 2019-11-27 RX ADMIN — Medication 650 MILLIGRAM(S): at 14:48

## 2019-11-27 RX ADMIN — Medication 650 MILLIGRAM(S): at 14:23

## 2019-11-27 RX ADMIN — Medication 650 MILLIGRAM(S): at 21:25

## 2019-11-27 RX ADMIN — LIDOCAINE 1 PATCH: 4 CREAM TOPICAL at 14:23

## 2019-11-27 RX ADMIN — Medication 3 MILLILITER(S): at 21:25

## 2019-11-27 RX ADMIN — LIDOCAINE 1 PATCH: 4 CREAM TOPICAL at 19:17

## 2019-11-27 RX ADMIN — PANTOPRAZOLE SODIUM 40 MILLIGRAM(S): 20 TABLET, DELAYED RELEASE ORAL at 05:11

## 2019-11-27 RX ADMIN — Medication 3 MILLILITER(S): at 05:12

## 2019-11-27 RX ADMIN — Medication 650 MILLIGRAM(S): at 22:00

## 2019-11-27 RX ADMIN — Medication 40 MILLIGRAM(S): at 05:10

## 2019-11-27 RX ADMIN — Medication 3 MILLILITER(S): at 14:22

## 2019-11-27 NOTE — CONSULT NOTE ADULT - PROBLEM SELECTOR RECOMMENDATION 9
management per primary team and pulmonary  supplemental o2 as tolerated  not actively dyspneic on examination today, speaking in full sentences

## 2019-11-27 NOTE — CONSULT NOTE ADULT - PROBLEM SELECTOR RECOMMENDATION 4
ACP completed today. FM planned for Friday at 11am to discuss abdominal imaging further. please call with questions

## 2019-11-27 NOTE — CONSULT NOTE ADULT - PROBLEM SELECTOR RECOMMENDATION 3
>46 minutes spent on ACP  MOLST completed by patient for DNR/DNI, copy in chart, original to patient  HCP also completed, naming daughter Carmel as primary, copy in chart, original to patient

## 2019-11-27 NOTE — PROGRESS NOTE ADULT - SUBJECTIVE AND OBJECTIVE BOX
CARDIOLOGY ATTENDING    tele: NSR, no events    no palpitations, no syncope, no angina, dyspnea improved, ROS otherwise -    acetaminophen   Tablet .. 650 milliGRAM(s) Oral every 6 hours PRN  albuterol/ipratropium for Nebulization 3 milliLiter(s) Nebulizer every 6 hours  amLODIPine   Tablet 5 milliGRAM(s) Oral daily  bisacodyl Suppository 10 milliGRAM(s) Rectal daily PRN  budesonide 160 MICROgram(s)/formoterol 4.5 MICROgram(s) Inhaler 2 Puff(s) Inhalation two times a day  enoxaparin Injectable 30 milliGRAM(s) SubCutaneous daily  lidocaine   Patch 1 Patch Transdermal daily  methylPREDNISolone sodium succinate Injectable 40 milliGRAM(s) IV Push two times a day  morphine  - Injectable 1 milliGRAM(s) IV Push every 6 hours PRN  ondansetron Injectable 4 milliGRAM(s) IV Push every 8 hours PRN  pantoprazole    Tablet 40 milliGRAM(s) Oral two times a day  polyethylene glycol 3350 17 Gram(s) Oral two times a day  senna 2 Tablet(s) Oral at bedtime  sodium chloride 0.65% Nasal 1 Spray(s) Both Nostrils two times a day  traMADol 25 milliGRAM(s) Oral every 6 hours PRN                            13.5   7.94  )-----------( 177      ( 26 Nov 2019 08:41 )             42.8       11-26    139  |  97  |  39<H>  ----------------------------<  140<H>  4.4   |  29  |  0.93    Ca    9.5      26 Nov 2019 07:03  Mg     2.4     11-26    TPro  6.7  /  Alb  4.0  /  TBili  0.3  /  DBili  x   /  AST  29  /  ALT  62<H>  /  AlkPhos  177<H>  11-26      T(C): 36.7 (11-27-19 @ 05:00), Max: 37.2 (11-26-19 @ 22:57)  HR: 67 (11-27-19 @ 05:00) (67 - 85)  BP: 151/70 (11-27-19 @ 05:00) (123/64 - 155/65)  RR: 18 (11-27-19 @ 05:00) (18 - 19)  SpO2: 96% (11-27-19 @ 05:00) (91% - 96%)  Wt(kg): --    A&O x 3  neurologically intact  no JVD  RRR, no murmurs  CTAB  soft nt/nd  no c/c/e    echo: mild LV dysfunction  cath pending today      A/P) 80 y/o female PMH COPD on Home O2 recently admitted s/p mechanical fall with Right 7 and 8th rib fractures now presents with shortness of breath admitted with COPD exacerbation. Echo shows mild LV dysfunction with regional wall motion abnormality. Underlying COPD precluded NST    - Pulm f/u - on IV steroids  - Abnormal TTE noted above  - f/u cath today

## 2019-11-27 NOTE — CONSULT NOTE ADULT - SUBJECTIVE AND OBJECTIVE BOX
HPI:  80 y/o female with hx of COPD on Home O2  discharged two days ago when she was admitted s/p mechanical fall resulting in fracture of R 7 and 8 th ribs.. was admitted to SICU for observation and was hemodynmaically stable... during that hospitalization she was found to have a liver lesion : w/U with CT and MRI showed fatty infiltration .. 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 pt was discharged home and now presenting with SOB /N/V.  reports worsening SOB from her baseline... pain in chest at the site of fracture has not changed.. +productive cough of yellow sputum.. no fever or chills ..  had two episodes of N/V NB  no abd pain.  no BM for 6 days  no urianry sx   C/O BLE pain in calf : no swelling at this time   no neuro complaints   labs : neg trop *2  EKG : new LBBB compared to 2016    RVP neg   EKG CTA neg (22 Nov 2019 10:20)    Palliative care consulted to assist with goals of care in a 78 yo F with known COPD as well as new pancreatic/abdominal lesions found on imaging. Introduced self and role, patient primarily Indonesian speaking. Spoke with patient in common shared language by this provider. Patient able to explain that she has COPD, remains on oxygen. She is aware that she has multiple medical issues but not able to elaborate regarding abdominal imaging findings. She identifies that her daughter Carmel is her primary care giver. Discussed HCP and code status with patient and daughter via phone. Patient able to complete HCP, naming Carmel and signed on this date. Original to patient, copy in chart. Patient also clear that her wishes would be a natural passing, including no CPR and no life support machines. DNR/DNI confirmed on this date and MOLST completed for the same with patients verbal consent. Original provided to patient and copy in chart. Discussed with teresa Carmel via phone regarding imaging studies- she believes she was told there are "lesions in the liver" but were considering not doing formal work up or unaware of any planned formal work up given patients age. Daughter willing to meet with palliative team and patient to discuss further, plan to meet Friday at 11am at bedside. contact information provided.     PERTINENT PM/SXH:   Raynauds disease  GERD (gastroesophageal reflux disease)  COPD (Chronic Obstructive Pulmonary Disease)    Status post cataract extraction  S/P left cataract extraction  History of appendectomy    FAMILY HISTORY:  No pertinent family history in first degree relatives    ITEMS NOT CHECKED ARE NOT PRESENT    SOCIAL HISTORY:   Significant other/partner:  [ ]  Children:  [x ]  Orthodox/Spirituality: Restoration  Substance hx:  [ ]   Tobacco hx:  [ ]   Alcohol hx: [ ]   Home Opioid hx:  [ ] I-Stop Reference No:  Living Situation: [ x]Home  [ ]Long term care  [ ]Rehab [ ]Other    ADVANCE DIRECTIVES:    DNR  MOLST  [ x]  Living Will  [ ]   DECISION MAKER(s):  [x ] Health Care Proxy(s)  [ ] Surrogate(s)  [ ] Guardian           Name(s): Phone Number(s):  Carmel Parrish [phone]    BASELINE (I)ADL(s) (prior to admission):  Breaks: [x ]Total  [ ] Moderate [ ]Dependent    Allergies    No Known Allergies    Intolerances    MEDICATIONS  (STANDING):  albuterol/ipratropium for Nebulization 3 milliLiter(s) Nebulizer every 6 hours  amLODIPine   Tablet 5 milliGRAM(s) Oral daily  budesonide 160 MICROgram(s)/formoterol 4.5 MICROgram(s) Inhaler 2 Puff(s) Inhalation two times a day  enoxaparin Injectable 30 milliGRAM(s) SubCutaneous daily  lidocaine   Patch 1 Patch Transdermal daily  pantoprazole    Tablet 40 milliGRAM(s) Oral two times a day  polyethylene glycol 3350 17 Gram(s) Oral two times a day  predniSONE   Tablet 40 milliGRAM(s) Oral daily  senna 2 Tablet(s) Oral at bedtime  sodium chloride 0.65% Nasal 1 Spray(s) Both Nostrils two times a day    MEDICATIONS  (PRN):  acetaminophen   Tablet .. 650 milliGRAM(s) Oral every 6 hours PRN Mild Pain (1 - 3), Moderate Pain (4 - 6)  bisacodyl Suppository 10 milliGRAM(s) Rectal daily PRN Constipation  morphine  - Injectable 1 milliGRAM(s) IV Push every 6 hours PRN for breakthrough pain not responding to other meds  ondansetron Injectable 4 milliGRAM(s) IV Push every 8 hours PRN Nausea and/or Vomitinga  traMADol 25 milliGRAM(s) Oral every 6 hours PRN Severe Pain (7 - 10)    PRESENT SYMPTOMS: [ ]Unable to obtain due to poor mentation   Source if other than patient:  [ ]Family   [ ]Team     Pain (Impact on QOL): denies  Location -         Minimal acceptable level (0-10 scale):                    Aggrevating factors -  Quality -  Radiation -  Severity (0-10 scale) -    Timing -    PAIN AD Score:     [email or web address] (press ctrl +  left click to view)    Dyspnea:                           [ ]Mild [ ]Moderate [ ]Severe  Anxiety:                             [ ]Mild [ ]Moderate [ ]Severe  Fatigue:                             [ ]Mild [ ]Moderate [ ]Severe  Nausea:                             [ ]Mild [ ]Moderate [ ]Severe  Loss of appetite:              [ ]Mild [ ]Moderate [ ]Severe  Constipation:                    [ ]Mild [ ]Moderate [ ]Severe    Other Symptoms:  [x ]All other review of systems negative     Karnofsky Performance Score/Palliative Performance Status Version 2:        60 %  PHYSICAL EXAM:  Vital Signs Last 24 Hrs  T(C): 37.3 (27 Nov 2019 13:06), Max: 37.3 (27 Nov 2019 13:06)  T(F): 99.2 (27 Nov 2019 13:06), Max: 99.2 (27 Nov 2019 13:06)  HR: 75 (27 Nov 2019 13:06) (67 - 85)  BP: 134/62 (27 Nov 2019 13:06) (134/62 - 155/65)  BP(mean): --  RR: 18 (27 Nov 2019 13:06) (18 - 19)  SpO2: 94% (27 Nov 2019 13:06) (91% - 96%) I&O's Summary    26 Nov 2019 07:01  -  27 Nov 2019 07:00  --------------------------------------------------------  IN: 520 mL / OUT: 801 mL / NET: -281 mL    27 Nov 2019 07:01  -  27 Nov 2019 15:57  --------------------------------------------------------  IN: 720 mL / OUT: 0 mL / NET: 720 mL    GENERAL: Indonesian speaking  [ x]Alert  [ x]Oriented x3   [ ]Lethargic  [ ]Cachexia  [ ]Unarousable  [ ]Verbal  [ ]Non-Verbal  Behavioral:   [ ] Anxiety  [ ] Delirium [ ] Agitation [ ] Other  HEENT:  [ ]Normal   [ x]Dry mouth   [ ]ET Tube/Trach  [ ]Oral lesions  PULMONARY:   [ ]Clear [ ]Tachypnea  [ ]Audible excessive secretions   [ ]Rhonchi        [ ]Right [ ]Left [ ]Bilateral  [x ]Crackles        [ ]Right [ ]Left [ ]Bilateral  [x ]Wheezing     [ ]Right [ ]Left [x ]Bilateral  CARDIOVASCULAR:    [x ]Regular [ ]Irregular [ ]Tachy  [ ]Ronaldo [ ]Murmur [ ]Other  GASTROINTESTINAL:  [ x]Soft  [ ]Distended   [x ]+BS  [ ]Non tender [ x]Tender  [ ]PEG [ ]OGT/ NGT  Last BM:   GENITOURINARY:  [x ]Normal [ ] Incontinent   [ ]Oliguria/Anuria   [ ]Lynne  MUSCULOSKELETAL:   [ ]Normal   [x ]Weakness  [ ]Bed/Wheelchair bound [ ]Edema  NEUROLOGIC:   [x ]No focal deficits  [ ] Cognitive impairment  [ ] Dysphagia [ ]Dysarthria [ ] Paresis [ ]Other   SKIN: ecchymoses  [ ]Normal   [ ]Pressure ulcer(s)  [ ]Rash    CRITICAL CARE:  [ ] Shock Present  [ ]Septic [ ]Cardiogenic [ ]Neurologic [ ]Hypovolemic  [ ]  Vasopressors [ ]  Inotropes   [x ] Respiratory failure present  [ ] Acute  [x ] Chronic [ ] Hypoxic  [ ] Hypercarbic [ ] Other  [ ] Other organ failure     LABS:                        13.5   7.94  )-----------( 177      ( 26 Nov 2019 08:41 )             42.8   11-27    141  |  99  |  38<H>  ----------------------------<  130<H>  4.8   |  29  |  1.09    Ca    9.5      27 Nov 2019 06:57  Mg     2.4     11-27    TPro  6.1  /  Alb  3.8  /  TBili  0.3  /  DBili  x   /  AST  29  /  ALT  61<H>  /  AlkPhos  166<H>  11-27    RADIOLOGY & ADDITIONAL STUDIES:  < from: Xray Chest 1 View- PORTABLE-Urgent (11.22.19 @ 14:03) >    EXAM:  XR CHEST PORTABLE URGENT 1V                            PROCEDURE DATE:  11/22/2019      INTERPRETATION:  CLINICAL INFORMATION: Shortness of breath for 2 days.    EXAM: Frontal view of chest.    COMPARISON: Chest radiograph from 11/18/2019.    FINDINGS:    No pleural effusion or pneumothorax.   Normal heart size.   Clear lungs.   No acute osseous abnormality.     IMPRESSION:   Clear lungs    LOLA FONSECA M.D., RADIOLOGY RESIDENT  This document has been electronically signed.  CAROL ALONZO M.D., ATTENDING RADIOLOGIST  This document has been electronically signed. Nov 22 2019  4:41PM       < end of copied text >  < from: MR Abdomen w/wo IV Cont (10.23.19 @ 09:28) >    EXAM:  MR ABDOMEN WAW IC        PROCEDURE DATE:  10/23/2019          INTERPRETATION:  CLINICAL INFORMATION: Liver lesion seen on recent   imaging studies.    COMPARISON: CT abdomen 6/14/2018, CT chest 9/16/2019, abdominal   ultrasound 9/27/2019.    PROCEDURE:   MRI of the abdomen was performed with and without intravenous contrast.  IV Contrast: Gadavist. 6 cc administered, for cc discarded.      FINDINGS:    LOWER CHEST: There are no pleural or pericardial effusions. The heart   size is normal.    LIVER: There is a 1.8 x 1.3 cm somewhat triangular area centrally in the   liver at the site of pain portal vein bifurcation (corresponding to the   lesion described on prior studies) which is minimally hyperintense on T2   and demonstrates loss of signal on all fat saturated sequences, most   compatible with a focal area of fatty infiltration. This has been stable   when compared to CT scans dating back to 10/10/2017 (chest CT). No other   focal lesions are appreciated.  BILE DUCTS: Normal caliber.  GALLBLADDER: Status postcholecystectomy.  SPLEEN: Within normal limits.  PANCREAS: Atrophic with multiple cystic lesions throughout the pancreas.   The largest lesion is in the tail measuring 1.1 x 1 cm. A lesion in the   pancreatic head measures 1.1 x 0.9 cm. The main pancreatic duct is normal   in caliber.  ADRENALS: Within normal limits.  KIDNEYS/URETERS: Symmetric enhancement. No hydronephrosis. Bilateral   renal cysts. The larger cyst in the right kidney is at the upper pole   measuring 3 x 2.4 cm. The left renal cyst is at the upper/mid pole   measuring 2.4 x 2.1 cm.    VISUALIZED PORTIONS:    BOWEL: Within normal limits.   PERITONEUM: No ascites.  VESSELS: The aorta and IVC are normal in caliber and patent.  RETROPERITONEUM/LYMPH NODES: No lymphadenopathy.    ABDOMINAL WALL: Within normal limits.  BONES: Normal bone marrow signal.    IMPRESSION:     Lesion centrally in the liver as described on prior recent CT and   ultrasound is most compatible with a focal area of fatty infiltration,   stable in size going back to studies from 2017. Details are as above.    Multiple cystic lesions throughout the pancreas, most likely representing   intraductal papillary mucinous neoplasm.    ROSARIO PETTY M.D. ATTENDING RADIOLOGIST  This document has been electronically signed. Oct 24 2019 11:57AM     < end of copied text >    PROTEIN CALORIE MALNUTRITION: No  [ ] PPSV2 < or = to 30% [ ] significant weight loss  [ ] poor nutritional intake [ ] catabolic state [ ] anasarca     Albumin, Serum: 3.8 g/dL (11-27-19 @ 06:57)  Artificial Nutrition [ ]     REFERRALS:   [ ]Chaplaincy  [ ] Hospice  [ ]Child Life  [ ]Social Work  [x ]Case management [ ]Holistic Therapy   Goals of Care Discussion Document:

## 2019-11-27 NOTE — PROGRESS NOTE ADULT - SUBJECTIVE AND OBJECTIVE BOX
Patient is a 79y old  Female who presents with a chief complaint of SOB (25 Nov 2019 09:10)      INTERVAL HPI/OVERNIGHT EVENTS: Feels well.  Denies any new complaints.  Ambulated to bathroom yesterday.      REVIEW OF SYSTEMS    General:denies fever or chills  Skin/Breast:no lesions, no rashes  Ophthalmologic:denies diploplia or blurred vision  ENT:denies sore throat or earache  Respiratory and Thorax: denies sob at rest, cough  Cardiovascular:denies cp or palp  Gastrointestinal: denies nausea or vomiting  Genitourinary: denies dysuria or polyuria  Neuro: denies headache or dizziness    MEDICATIONS  (STANDING):  albuterol/ipratropium for Nebulization 3 milliLiter(s) Nebulizer every 6 hours  amLODIPine   Tablet 5 milliGRAM(s) Oral daily  budesonide 160 MICROgram(s)/formoterol 4.5 MICROgram(s) Inhaler 2 Puff(s) Inhalation two times a day  enoxaparin Injectable 30 milliGRAM(s) SubCutaneous daily  lidocaine   Patch 1 Patch Transdermal daily  pantoprazole    Tablet 40 milliGRAM(s) Oral two times a day  polyethylene glycol 3350 17 Gram(s) Oral two times a day  predniSONE   Tablet 40 milliGRAM(s) Oral daily  senna 2 Tablet(s) Oral at bedtime  sodium chloride 0.65% Nasal 1 Spray(s) Both Nostrils two times a day      MEDICATIONS  (PRN):  acetaminophen   Tablet .. 650 milliGRAM(s) Oral every 6 hours PRN Mild Pain (1 - 3), Moderate Pain (4 - 6)  bisacodyl Suppository 10 milliGRAM(s) Rectal daily PRN Constipation  morphine  - Injectable 1 milliGRAM(s) IV Push every 6 hours PRN for breakthrough pain not responding to other meds  ondansetron Injectable 4 milliGRAM(s) IV Push every 8 hours PRN Nausea and/or Vomitinga  traMADol 25 milliGRAM(s) Oral every 6 hours PRN Severe Pain (7 - 10)      Allergies    No Known Allergies    Intolerances        PAST MEDICAL & SURGICAL HISTORY:  Raynauds disease  GERD (gastroesophageal reflux disease)  COPD (Chronic Obstructive Pulmonary Disease): no intubation hx  last exacerb 11/2016  PRN 2 L NC  Status post cataract extraction: right eye in 2014  S/P left cataract extraction  History of appendectomy      Vital Signs Last 24 Hrs  T(C): 36.7 (27 Nov 2019 05:00), Max: 37.2 (26 Nov 2019 22:57)  T(F): 98.1 (27 Nov 2019 05:00), Max: 98.9 (26 Nov 2019 22:57)  HR: 67 (27 Nov 2019 05:00) (67 - 85)  BP: 151/70 (27 Nov 2019 05:00) (123/64 - 155/65)  BP(mean): --  RR: 18 (27 Nov 2019 05:00) (18 - 19)  SpO2: 96% (27 Nov 2019 05:00) (91% - 96%)    PHYSICAL EXAMINATION:    GENERAL: The patient is awake and alert in no apparent distress on nasal O2.     HEENT: Head is normocephalic and atraumatic. Extraocular muscles are intact. Mucous membranes are moist.    NECK: Supple.    LUNGS: Clear to auscultation without wheezing, rales or rhonchi; respirations unlabored    HEART: Regular rate and rhythm without murmur.    ABDOMEN: Soft, nontender, and nondistended.      EXTREMITIES: Without any cyanosis, clubbing, rash, lesions or edema.    NEUROLOGIC: Grossly intact.    SKIN: No ulceration or induration present.      LABS:                        13.5   7.94  )-----------( 177      ( 26 Nov 2019 08:41 )             42.8     11-27    141  |  99  |  38<H>  ----------------------------<  130<H>  4.8   |  29  |  1.09    Ca    9.5      27 Nov 2019 06:57  Mg     2.4     11-27    TPro  6.1  /  Alb  3.8  /  TBili  0.3  /  DBili  x   /  AST  29  /  ALT  61<H>  /  AlkPhos  166<H>  11-27

## 2019-11-27 NOTE — PROGRESS NOTE ADULT - SUBJECTIVE AND OBJECTIVE BOX
Patient is a 79y old  Female who presents with a chief complaint of COPD exacerbation (2019 15:57)                                                               INTERVAL HPI/OVERNIGHT EVENTS:    REVIEW OF SYSTEMS:     CONSTITUTIONAL: No weakness, fevers or chills  EYES/ENT: No visual changes , no ear ache   NECK: No pain or stiffness  RESPIRATORY: No cough, wheezing,  No shortness of breath  CARDIOVASCULAR: No chest pain or palpitations  GASTROINTESTINAL: No abdominal pain  . No nausea, vomiting, or hematemesis; No diarrhea or constipation. No melena or hematochezia.  GENITOURINARY: No dysuria, frequency or hematuria  NEUROLOGICAL: No numbness or weakness  SKIN: No itching, burning, rashes, or lesions                                                                                                                                                                                                                                                                                 Medications:  MEDICATIONS  (STANDING):  albuterol/ipratropium for Nebulization 3 milliLiter(s) Nebulizer every 6 hours  amLODIPine   Tablet 5 milliGRAM(s) Oral daily  budesonide 160 MICROgram(s)/formoterol 4.5 MICROgram(s) Inhaler 2 Puff(s) Inhalation two times a day  enoxaparin Injectable 30 milliGRAM(s) SubCutaneous daily  lidocaine   Patch 1 Patch Transdermal daily  pantoprazole    Tablet 40 milliGRAM(s) Oral two times a day  polyethylene glycol 3350 17 Gram(s) Oral two times a day  predniSONE   Tablet 40 milliGRAM(s) Oral daily  senna 2 Tablet(s) Oral at bedtime  sodium chloride 0.65% Nasal 1 Spray(s) Both Nostrils two times a day    MEDICATIONS  (PRN):  acetaminophen   Tablet .. 650 milliGRAM(s) Oral every 6 hours PRN Mild Pain (1 - 3), Moderate Pain (4 - 6)  bisacodyl Suppository 10 milliGRAM(s) Rectal daily PRN Constipation  morphine  - Injectable 1 milliGRAM(s) IV Push every 6 hours PRN for breakthrough pain not responding to other meds  ondansetron Injectable 4 milliGRAM(s) IV Push every 8 hours PRN Nausea and/or Vomitinga  traMADol 25 milliGRAM(s) Oral every 6 hours PRN Severe Pain (7 - 10)       Allergies    No Known Allergies    Intolerances      Vital Signs Last 24 Hrs  T(C): 36.9 (2019 20:26), Max: 37.3 (2019 13:06)  T(F): 98.5 (2019 20:26), Max: 99.2 (2019 13:06)  HR: 71 (2019 21:00) (67 - 75)  BP: 144/81 (2019 21:00) (134/62 - 151/70)  BP(mean): --  RR: 19 (2019 20:26) (18 - 19)  SpO2: 93% (2019 20:26) (93% - 96%)  CAPILLARY BLOOD GLUCOSE           @ 07:  -   @ 07:00  --------------------------------------------------------  IN: 520 mL / OUT: 801 mL / NET: -281 mL     @ 07: @ 23:26  --------------------------------------------------------  IN: 1070 mL / OUT: 625 mL / NET: 445 mL      Physical Exam:    Daily     Daily Weight in k.8 (2019 07:15)  General:  Well appearing, NAD, not cachetic  HEENT:  Nonicteric, PERRLA  CV:  RRR, S1S2   Lungs:  CTA B/L, no wheezes, rales, rhonchi  Abdomen:  Soft, non-tender, no distended, positive BS  Extremities:  2+ pulses, no c/c, no edema  Skin:  Warm and dry, no rashes  :  No dillon  Neuro:  AAOx3, non-focal, grossly intact                                                                                                                                                                                                                                                                                                LABS:                               13.5   7.94  )-----------( 177      ( 2019 08:41 )             42.8                          141  |  99  |  38<H>  ----------------------------<  130<H>  4.8   |  29  |  1.09    Ca    9.5      2019 06:57  Mg     2.4         TPro  6.1  /  Alb  3.8  /  TBili  0.3  /  DBili  x   /  AST  29  /  ALT  61<H>  /  AlkPhos  166<H>  11-27                       RADIOLOGY & ADDITIONAL TESTS         I personally reviewed: [  ]EKG   [  ]CXR    [  ] CT      A/P:         Discussed with :     Arthur consultants' Notes   Time spent : Patient is a 79y old  Female who presents with a chief complaint of COPD exacerbation (2019 15:57)                                                               INTERVAL HPI/OVERNIGHT EVENTS:    REVIEW OF SYSTEMS:     CONSTITUTIONAL: No weakness, fevers or chills  EYES/ENT: No visual changes , no ear ache   NECK: No pain or stiffness  RESPIRATORY: No cough, wheezing,  No shortness of breath  CARDIOVASCULAR: No chest pain or palpitations  GASTROINTESTINAL: No abdominal pain  . No nausea, vomiting, or hematemesis; No diarrhea or constipation. No melena or hematochezia.  GENITOURINARY: No dysuria, frequency or hematuria  NEUROLOGICAL: No numbness or weakness  SKIN: No itching, burning, rashes, or lesions                                                                                                                                                                                                                                                                                 Medications:  MEDICATIONS  (STANDING):  albuterol/ipratropium for Nebulization 3 milliLiter(s) Nebulizer every 6 hours  amLODIPine   Tablet 5 milliGRAM(s) Oral daily  budesonide 160 MICROgram(s)/formoterol 4.5 MICROgram(s) Inhaler 2 Puff(s) Inhalation two times a day  enoxaparin Injectable 30 milliGRAM(s) SubCutaneous daily  lidocaine   Patch 1 Patch Transdermal daily  pantoprazole    Tablet 40 milliGRAM(s) Oral two times a day  polyethylene glycol 3350 17 Gram(s) Oral two times a day  predniSONE   Tablet 40 milliGRAM(s) Oral daily  senna 2 Tablet(s) Oral at bedtime  sodium chloride 0.65% Nasal 1 Spray(s) Both Nostrils two times a day    MEDICATIONS  (PRN):  acetaminophen   Tablet .. 650 milliGRAM(s) Oral every 6 hours PRN Mild Pain (1 - 3), Moderate Pain (4 - 6)  bisacodyl Suppository 10 milliGRAM(s) Rectal daily PRN Constipation  morphine  - Injectable 1 milliGRAM(s) IV Push every 6 hours PRN for breakthrough pain not responding to other meds  ondansetron Injectable 4 milliGRAM(s) IV Push every 8 hours PRN Nausea and/or Vomitinga  traMADol 25 milliGRAM(s) Oral every 6 hours PRN Severe Pain (7 - 10)       Allergies    No Known Allergies    Intolerances      Vital Signs Last 24 Hrs  T(C): 36.9 (2019 20:26), Max: 37.3 (2019 13:06)  T(F): 98.5 (2019 20:26), Max: 99.2 (2019 13:06)  HR: 71 (2019 21:00) (67 - 75)  BP: 144/81 (2019 21:00) (134/62 - 151/70)  BP(mean): --  RR: 19 (2019 20:26) (18 - 19)  SpO2: 93% (2019 20:26) (93% - 96%)  CAPILLARY BLOOD GLUCOSE           @ 07:  -   @ 07:00  --------------------------------------------------------  IN: 520 mL / OUT: 801 mL / NET: -281 mL     @ 07:  -   @ 23:26  --------------------------------------------------------  IN: 1070 mL / OUT: 625 mL / NET: 445 mL      Physical Exam:    Daily     Daily Weight in k.8 (2019 07:15)  General:  NAD   HEENT:  Nonicteric, PERRLA  CV:  RRR, S1S2   Lungs: Decreased air entry   otherwise CTA   Abdomen:  Soft, non-tender, no distended, positive BS  Extremities: no edema   Neuro:  AAOx3, non-focal, grossly intact                                                                                                                                                                                                                                                                                                LABS:                               13.5   7.94  )-----------( 177      ( 2019 08:41 )             42.8                          141  |  99  |  38<H>  ----------------------------<  130<H>  4.8   |  29  |  1.09    Ca    9.5      2019 06:57  Mg     2.4         TPro  6.1  /  Alb  3.8  /  TBili  0.3  /  DBili  x   /  AST  29  /  ALT  61<H>  /  AlkPhos  166<H>

## 2019-11-27 NOTE — PROGRESS NOTE ADULT - ASSESSMENT
Assessment:  Severe COPD with Exacerbation - Improved   Recent right 7,8 rib fractures    Plan:  02 continuously  Switch to Prednisone today  Pulmonary toilet reviewed.   Lidocaine patch for rib fracture pain on discharge  Tylenol/Motrin prn  DVT prophylaxis  GI follow up as outpatient for her pancreatic lesions but doubt we will pursue them given her medical condition  Trelegy/Nicolás on discharge  Discharge planning

## 2019-11-27 NOTE — PROGRESS NOTE ADULT - ASSESSMENT
78 y/o female with hx of COPD on Home O2  discharged two days ago when she was admitted s/p mechanical fall resulting in fracture of R 7 and 8 th ribs.. was admitted to SICU for observation and was hemodynmaically stable... during that hospitalization she was found to have a liver lesion : w/U with CT and MRI showed fatty infiltration .. 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 pt was discharged home and now presenting with SOB /N/V.  reports worsening SOB from her baseline... pain in chest at the site of fracture has not changed.. +productive cough of yellow sputum.. no fever or chills ..  had two episodes of N/V NB  no abd pain.  no BM for 6 days  no urianry sx   C/O BLE pain in calf : no swelling at this time   no neuro complaints   labs : neg trop *2  EKG : new LBBB compared to 2016    RVP neg   EKG CTA neg     - acute hpoxic respiratory failure sec to COPD exacerrbation:    improved with BIPAP and now on NC   cont  nebs and steroids  pul f/u appreciated      - LE pain : Duplex neg for DVT     - New EKG findings with LBB .. discussed with cardio : will proceed w cath...     - N/V: tolerating po   monitor and add prn zofran     - constipation : bowel regimen     - rib Fx : IS and monitor   pain meds     - elevated LFT :  likely sec to liver infiltration reported during last admit   monitor for now       again discussed GOC however pt and daughter still indecisive.. will consult palliative 80 y/o female with hx of COPD on Home O2  discharged two days ago when she was admitted s/p mechanical fall resulting in fracture of R 7 and 8 th ribs.. was admitted to SICU for observation and was hemodynmaically stable... during that hospitalization she was found to have a liver lesion : w/U with CT and MRI showed fatty infiltration .. 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 pt was discharged home and now presenting with SOB /N/V.  reports worsening SOB from her baseline... pain in chest at the site of fracture has not changed.. +productive cough of yellow sputum.. no fever or chills ..  had two episodes of N/V NB  no abd pain.  no BM for 6 days  no urianry sx   C/O BLE pain in calf : no swelling at this time   no neuro complaints   labs : neg trop *2  EKG : new LBBB compared to 2016    RVP neg   EKG CTA neg     - acute hpoxic respiratory failure sec to COPD exacerrbation:    improved with BIPAP and now on NC   cont  nebs and steroids  pul f/u appreciated      - LE pain : Duplex neg for DVT     - New EKG findings with LBB .. discussed with cardio : cath : no significant CAD   cont medical management     - N/V: tolerating po   monitor and add prn zofran     - constipation : bowel regimen     - rib Fx : IS and monitor   pain meds     - elevated LFT :  likely sec to liver infiltration reported during last admit   monitor for now       again discussed GOC however pt and daughter still indecisive.. will consult palliative

## 2019-11-28 LAB
ANION GAP SERPL CALC-SCNC: 11 MMOL/L — SIGNIFICANT CHANGE UP (ref 5–17)
BUN SERPL-MCNC: 38 MG/DL — HIGH (ref 7–23)
CALCIUM SERPL-MCNC: 9.8 MG/DL — SIGNIFICANT CHANGE UP (ref 8.4–10.5)
CHLORIDE SERPL-SCNC: 100 MMOL/L — SIGNIFICANT CHANGE UP (ref 96–108)
CO2 SERPL-SCNC: 28 MMOL/L — SIGNIFICANT CHANGE UP (ref 22–31)
CREAT SERPL-MCNC: 1.2 MG/DL — SIGNIFICANT CHANGE UP (ref 0.5–1.3)
GLUCOSE SERPL-MCNC: 161 MG/DL — HIGH (ref 70–99)
HCT VFR BLD CALC: 41.9 % — SIGNIFICANT CHANGE UP (ref 34.5–45)
HGB BLD-MCNC: 13.2 G/DL — SIGNIFICANT CHANGE UP (ref 11.5–15.5)
MCHC RBC-ENTMCNC: 30.7 PG — SIGNIFICANT CHANGE UP (ref 27–34)
MCHC RBC-ENTMCNC: 31.5 GM/DL — LOW (ref 32–36)
MCV RBC AUTO: 97.4 FL — SIGNIFICANT CHANGE UP (ref 80–100)
PLATELET # BLD AUTO: 230 K/UL — SIGNIFICANT CHANGE UP (ref 150–400)
POTASSIUM SERPL-MCNC: 5 MMOL/L — SIGNIFICANT CHANGE UP (ref 3.5–5.3)
POTASSIUM SERPL-SCNC: 5 MMOL/L — SIGNIFICANT CHANGE UP (ref 3.5–5.3)
RBC # BLD: 4.3 M/UL — SIGNIFICANT CHANGE UP (ref 3.8–5.2)
RBC # FLD: 13.5 % — SIGNIFICANT CHANGE UP (ref 10.3–14.5)
SODIUM SERPL-SCNC: 139 MMOL/L — SIGNIFICANT CHANGE UP (ref 135–145)
WBC # BLD: 8.69 K/UL — SIGNIFICANT CHANGE UP (ref 3.8–10.5)
WBC # FLD AUTO: 8.69 K/UL — SIGNIFICANT CHANGE UP (ref 3.8–10.5)

## 2019-11-28 RX ORDER — ACETAMINOPHEN 500 MG
650 TABLET ORAL EVERY 6 HOURS
Refills: 0 | Status: DISCONTINUED | OUTPATIENT
Start: 2019-11-28 | End: 2019-11-30

## 2019-11-28 RX ORDER — LANOLIN ALCOHOL/MO/W.PET/CERES
3 CREAM (GRAM) TOPICAL AT BEDTIME
Refills: 0 | Status: DISCONTINUED | OUTPATIENT
Start: 2019-11-28 | End: 2019-11-30

## 2019-11-28 RX ORDER — FUROSEMIDE 40 MG
40 TABLET ORAL ONCE
Refills: 0 | Status: COMPLETED | OUTPATIENT
Start: 2019-11-28 | End: 2019-11-28

## 2019-11-28 RX ADMIN — Medication 40 MILLIGRAM(S): at 05:31

## 2019-11-28 RX ADMIN — PANTOPRAZOLE SODIUM 40 MILLIGRAM(S): 20 TABLET, DELAYED RELEASE ORAL at 18:23

## 2019-11-28 RX ADMIN — BUDESONIDE AND FORMOTEROL FUMARATE DIHYDRATE 2 PUFF(S): 160; 4.5 AEROSOL RESPIRATORY (INHALATION) at 18:27

## 2019-11-28 RX ADMIN — Medication 3 MILLILITER(S): at 22:00

## 2019-11-28 RX ADMIN — LIDOCAINE 1 PATCH: 4 CREAM TOPICAL at 02:02

## 2019-11-28 RX ADMIN — PANTOPRAZOLE SODIUM 40 MILLIGRAM(S): 20 TABLET, DELAYED RELEASE ORAL at 05:31

## 2019-11-28 RX ADMIN — Medication 3 MILLIGRAM(S): at 22:02

## 2019-11-28 RX ADMIN — Medication 650 MILLIGRAM(S): at 23:36

## 2019-11-28 RX ADMIN — LIDOCAINE 1 PATCH: 4 CREAM TOPICAL at 12:54

## 2019-11-28 RX ADMIN — Medication 1 SPRAY(S): at 18:22

## 2019-11-28 RX ADMIN — BUDESONIDE AND FORMOTEROL FUMARATE DIHYDRATE 2 PUFF(S): 160; 4.5 AEROSOL RESPIRATORY (INHALATION) at 05:31

## 2019-11-28 RX ADMIN — SENNA PLUS 2 TABLET(S): 8.6 TABLET ORAL at 22:02

## 2019-11-28 RX ADMIN — Medication 3 MILLILITER(S): at 05:31

## 2019-11-28 RX ADMIN — AMLODIPINE BESYLATE 5 MILLIGRAM(S): 2.5 TABLET ORAL at 05:31

## 2019-11-28 RX ADMIN — Medication 3 MILLILITER(S): at 12:54

## 2019-11-28 RX ADMIN — Medication 40 MILLIGRAM(S): at 07:01

## 2019-11-28 RX ADMIN — Medication 3 MILLILITER(S): at 18:22

## 2019-11-28 RX ADMIN — ENOXAPARIN SODIUM 30 MILLIGRAM(S): 100 INJECTION SUBCUTANEOUS at 12:54

## 2019-11-28 NOTE — PROGRESS NOTE ADULT - SUBJECTIVE AND OBJECTIVE BOX
Patient is a 79y old  Female who presents with a chief complaint of COPD exacerbation (2019 15:57)                                                               INTERVAL HPI/OVERNIGHT EVENTS:    REVIEW OF SYSTEMS:     CONSTITUTIONAL: No weakness, fevers or chills  EYES/ENT: No visual changes , no ear ache   NECK: No pain or stiffness  RESPIRATORY: No cough, wheezing,  No shortness of breath  CARDIOVASCULAR: No chest pain or palpitations  GASTROINTESTINAL: No abdominal pain  . No nausea, vomiting, or hematemesis; No diarrhea or constipation. No melena or hematochezia.  GENITOURINARY: No dysuria, frequency or hematuria  NEUROLOGICAL: No numbness or weakness  SKIN: No itching, burning, rashes, or lesions                                                                                                                                                                                                                                                                                 Medications:  MEDICATIONS  (STANDING):  acetaminophen   Tablet .. 650 milliGRAM(s) Oral every 6 hours  albuterol/ipratropium for Nebulization 3 milliLiter(s) Nebulizer every 6 hours  amLODIPine   Tablet 5 milliGRAM(s) Oral daily  budesonide 160 MICROgram(s)/formoterol 4.5 MICROgram(s) Inhaler 2 Puff(s) Inhalation two times a day  enoxaparin Injectable 30 milliGRAM(s) SubCutaneous daily  lidocaine   Patch 1 Patch Transdermal daily  melatonin 3 milliGRAM(s) Oral at bedtime  pantoprazole    Tablet 40 milliGRAM(s) Oral two times a day  polyethylene glycol 3350 17 Gram(s) Oral two times a day  predniSONE   Tablet 40 milliGRAM(s) Oral daily  senna 2 Tablet(s) Oral at bedtime  sodium chloride 0.65% Nasal 1 Spray(s) Both Nostrils two times a day    MEDICATIONS  (PRN):  bisacodyl Suppository 10 milliGRAM(s) Rectal daily PRN Constipation  ondansetron Injectable 4 milliGRAM(s) IV Push every 8 hours PRN Nausea and/or Vomitinga       Allergies    No Known Allergies    Intolerances      Vital Signs Last 24 Hrs  T(C): 36.6 (2019 20:53), Max: 36.8 (2019 04:49)  T(F): 97.9 (2019 20:53), Max: 98.2 (2019 04:49)  HR: 85 (2019 20:53) (72 - 85)  BP: 135/69 (2019 20:53) (132/70 - 137/71)  BP(mean): --  RR: 19 (2019 20:53) (18 - 19)  SpO2: 93% (2019 20:53) (93% - 96%)  CAPILLARY BLOOD GLUCOSE           @ 07: @ 07:00  --------------------------------------------------------  IN: 1070 mL / OUT: 1025 mL / NET: 45 mL     @ 07: @ 22:32  --------------------------------------------------------  IN: 980 mL / OUT: 900 mL / NET: 80 mL      Physical Exam:    Daily     Daily Weight in k.2 (2019 07:15)  General:  Well appearing, NAD, not cachetic  HEENT:  Nonicteric, PERRLA  CV:  RRR, S1S2   Lungs:  CTA B/L, no wheezes, rales, rhonchi  Abdomen:  Soft, non-tender, no distended, positive BS  Extremities:  2+ pulses, no c/c, no edema  Skin:  Warm and dry, no rashes  :  No dillon  Neuro:  AAOx3, non-focal, grossly intact                                                                                                                                                                                                                                                                                                LABS:                               13.2   8.69  )-----------( 230      ( 2019 09:05 )             41.9                          139  |  100  |  38<H>  ----------------------------<  161<H>  5.0   |  28  |  1.20    Ca    9.8      2019 06:06  Mg     2.4         TPro  6.1  /  Alb  3.8  /  TBili  0.3  /  DBili  x   /  AST  29  /  ALT  61<H>  /  AlkPhos  166<H>                         RADIOLOGY & ADDITIONAL TESTS         I personally reviewed: [  ]EKG   [  ]CXR    [  ] CT      A/P:         Discussed with :     Arthur consultants' Notes   Time spent : Patient is a 79y old  Female who presents with a chief complaint of COPD exacerbation (2019 15:57)                                                               INTERVAL HPI/OVERNIGHT EVENTS:    REVIEW OF SYSTEMS:     CONSTITUTIONAL: No weakness, fevers or chills  EYES/ENT: No visual changes , no ear ache   NECK: No pain or stiffness  RESPIRATORY: No cough, wheezing,  No shortness of breath  CARDIOVASCULAR: No chest pain or palpitations  GASTROINTESTINAL: No abdominal pain  . No nausea, vomiting, or hematemesis; No diarrhea or constipation. No melena or hematochezia.  GENITOURINARY: No dysuria, frequency or hematuria  NEUROLOGICAL: No numbness or weakness  SKIN: No itching, burning, rashes, or lesions                                                                                                                                                                                                                                                                                 Medications:  MEDICATIONS  (STANDING):  acetaminophen   Tablet .. 650 milliGRAM(s) Oral every 6 hours  albuterol/ipratropium for Nebulization 3 milliLiter(s) Nebulizer every 6 hours  amLODIPine   Tablet 5 milliGRAM(s) Oral daily  budesonide 160 MICROgram(s)/formoterol 4.5 MICROgram(s) Inhaler 2 Puff(s) Inhalation two times a day  enoxaparin Injectable 30 milliGRAM(s) SubCutaneous daily  lidocaine   Patch 1 Patch Transdermal daily  melatonin 3 milliGRAM(s) Oral at bedtime  pantoprazole    Tablet 40 milliGRAM(s) Oral two times a day  polyethylene glycol 3350 17 Gram(s) Oral two times a day  predniSONE   Tablet 40 milliGRAM(s) Oral daily  senna 2 Tablet(s) Oral at bedtime  sodium chloride 0.65% Nasal 1 Spray(s) Both Nostrils two times a day    MEDICATIONS  (PRN):  bisacodyl Suppository 10 milliGRAM(s) Rectal daily PRN Constipation  ondansetron Injectable 4 milliGRAM(s) IV Push every 8 hours PRN Nausea and/or Vomitinga       Allergies    No Known Allergies    Intolerances      Vital Signs Last 24 Hrs  T(C): 36.6 (2019 20:53), Max: 36.8 (2019 04:49)  T(F): 97.9 (2019 20:53), Max: 98.2 (2019 04:49)  HR: 85 (2019 20:53) (72 - 85)  BP: 135/69 (2019 20:53) (132/70 - 137/71)  BP(mean): --  RR: 19 (2019 20:53) (18 - 19)  SpO2: 93% (2019 20:53) (93% - 96%)  CAPILLARY BLOOD GLUCOSE           @ 07: @ 07:00  --------------------------------------------------------  IN: 1070 mL / OUT: 1025 mL / NET: 45 mL     @ 07: @ 22:32  --------------------------------------------------------  IN: 980 mL / OUT: 900 mL / NET: 80 mL      Physical Exam:    Daily     Daily Weight in k.2 (2019 07:15)  General:  Well appearing, NAD, not cachetic  HEENT:  Nonicteric, PERRLA  CV:  RRR, S1S2   Lungs:  mild crackles   Abdomen:  Soft, non-tender, no distended, positive BS  Extremities:  2+ pulses, no c/c, no edema  Skin:  Warm and dry, no rashes  :  No dillon  Neuro:  AAOx3, non-focal, grossly intact                                                                                                                                                                                                                                                                                                LABS:                               13.2   8.69  )-----------( 230      ( 2019 09:05 )             41.9                          139  |  100  |  38<H>  ----------------------------<  161<H>  5.0   |  28  |  1.20    Ca    9.8      2019 06:06  Mg     2.4         TPro  6.1  /  Alb  3.8  /  TBili  0.3  /  DBili  x   /  AST  29  /  ALT  61<H>  /  AlkPhos  166<H>

## 2019-11-28 NOTE — PROGRESS NOTE ADULT - SUBJECTIVE AND OBJECTIVE BOX
pt seen and examined, no complaints, ROS - .          acetaminophen   Tablet .. 650 milliGRAM(s) Oral every 6 hours PRN  albuterol/ipratropium for Nebulization 3 milliLiter(s) Nebulizer every 6 hours  amLODIPine   Tablet 5 milliGRAM(s) Oral daily  bisacodyl Suppository 10 milliGRAM(s) Rectal daily PRN  budesonide 160 MICROgram(s)/formoterol 4.5 MICROgram(s) Inhaler 2 Puff(s) Inhalation two times a day  enoxaparin Injectable 30 milliGRAM(s) SubCutaneous daily  lidocaine   Patch 1 Patch Transdermal daily  morphine  - Injectable 1 milliGRAM(s) IV Push every 6 hours PRN  ondansetron Injectable 4 milliGRAM(s) IV Push every 8 hours PRN  pantoprazole    Tablet 40 milliGRAM(s) Oral two times a day  polyethylene glycol 3350 17 Gram(s) Oral two times a day  predniSONE   Tablet 40 milliGRAM(s) Oral daily  senna 2 Tablet(s) Oral at bedtime  sodium chloride 0.65% Nasal 1 Spray(s) Both Nostrils two times a day  traMADol 25 milliGRAM(s) Oral every 6 hours PRN                            13.5   7.94  )-----------( 177      ( 26 Nov 2019 08:41 )             42.8       Hemoglobin: 13.5 g/dL (11-26 @ 08:41)  Hemoglobin: 12.7 g/dL (11-25 @ 07:52)      11-27    141  |  99  |  38<H>  ----------------------------<  130<H>  4.8   |  29  |  1.09    Ca    9.5      27 Nov 2019 06:57  Mg     2.4     11-27    TPro  6.1  /  Alb  3.8  /  TBili  0.3  /  DBili  x   /  AST  29  /  ALT  61<H>  /  AlkPhos  166<H>  11-27    Creatinine Trend: 1.09<--, 0.93<--, 0.96<--, 0.93<--, 0.91<--, 0.84<--    COAGS:           T(C): 36.8 (11-28-19 @ 04:49), Max: 37.3 (11-27-19 @ 13:06)  HR: 72 (11-28-19 @ 04:49) (71 - 75)  BP: 132/70 (11-28-19 @ 04:49) (132/70 - 144/81)  RR: 18 (11-28-19 @ 04:49) (18 - 19)  SpO2: 96% (11-28-19 @ 04:49) (93% - 96%)  Wt(kg): --    I&O's Summary    26 Nov 2019 07:01  -  27 Nov 2019 07:00  --------------------------------------------------------  IN: 520 mL / OUT: 801 mL / NET: -281 mL    27 Nov 2019 07:01  -  28 Nov 2019 06:22  --------------------------------------------------------  IN: 1070 mL / OUT: 625 mL / NET: 445 mL      A&O x 3  neurologically intact  no JVD  RRR, no murmurs  CTAB  soft nt/nd  no c/c/e    echo: mild LV dysfunction  Cath cardiac : mild luminal irregularities       A/P) 78 y/o female PMH COPD on Home O2 recently admitted s/p mechanical fall with Right 7 and 8th rib fractures now presents with shortness of breath admitted with COPD exacerbation. Echo shows mild LV dysfunction with regional wall motion abnormality. Underlying COPD precluded NST    - Pulm f/u , steroids, bronchodilators   - Abnormal TTE   -  GI / DVT prophylaxis, keep K>4, mag >2.0   - tele stable   - Cath noted   - palliative follow up   D/W Dr Solano

## 2019-11-28 NOTE — PROGRESS NOTE ADULT - ASSESSMENT
78 y/o female with hx of COPD on Home O2  discharged two days ago when she was admitted s/p mechanical fall resulting in fracture of R 7 and 8 th ribs.. was admitted to SICU for observation and was hemodynmaically stable... during that hospitalization she was found to have a liver lesion : w/U with CT and MRI showed fatty infiltration .. 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 pt was discharged home and now presenting with SOB /N/V.  reports worsening SOB from her baseline... pain in chest at the site of fracture has not changed.. +productive cough of yellow sputum.. no fever or chills ..  had two episodes of N/V NB  no abd pain.  no BM for 6 days  no urianry sx   C/O BLE pain in calf : no swelling at this time   no neuro complaints   labs : neg trop *2  EKG : new LBBB compared to 2016    RVP neg   EKG CTA neg     - acute hpoxic respiratory failure sec to COPD exacerrbation:    improved with BIPAP and now on NC   cont  nebs and steroids  pul f/u appreciated      - LE pain : Duplex neg for DVT     - New EKG findings with LBB .. discussed with cardio : will proceed w cath...     - N/V: tolerating po   monitor and add prn zofran     - constipation : bowel regimen     - rib Fx : IS and monitor   pain meds     - elevated LFT :  likely sec to liver infiltration reported during last admit   monitor for now       again discussed GOC however pt and daughter still indecisive.. will consult palliative 78 y/o female with hx of COPD on Home O2  discharged two days ago when she was admitted s/p mechanical fall resulting in fracture of R 7 and 8 th ribs.. was admitted to SICU for observation and was hemodynmaically stable... during that hospitalization she was found to have a liver lesion : w/U with CT and MRI showed fatty infiltration .. 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 pt was discharged home and now presenting with SOB /N/V.  reports worsening SOB from her baseline... pain in chest at the site of fracture has not changed.. +productive cough of yellow sputum.. no fever or chills ..  had two episodes of N/V NB  no abd pain.  no BM for 6 days  no urianry sx   C/O BLE pain in calf : no swelling at this time   no neuro complaints   labs : neg trop *2  EKG : new LBBB compared to 2016    RVP neg   EKG CTA neg     - acute hpoxic respiratory failure sec to COPD exacerrbation:    improved with BIPAP and now on NC   cont  nebs and steroids  pul f/u appreciated      - LE pain : Duplex neg for DVT     - New EKG findings with LBB .. discussed with cardio : no sginifacnt CAD     medical managme t    - N/V: tolerating po   monitor and add prn zofran     - constipation : bowel regimen     - rib Fx : IS and monitor   pain meds     - elevated LFT :  likely sec to liver infiltration reported during last admit   monitor for now       again discussed GOC however pt and daughter still indecisive.. will consult palliative

## 2019-11-29 ENCOUNTER — TRANSCRIPTION ENCOUNTER (OUTPATIENT)
Age: 79
End: 2019-11-29

## 2019-11-29 LAB
ALBUMIN SERPL ELPH-MCNC: 3.6 G/DL — SIGNIFICANT CHANGE UP (ref 3.3–5)
ALP SERPL-CCNC: 160 U/L — HIGH (ref 40–120)
ALT FLD-CCNC: 77 U/L — HIGH (ref 10–45)
ANION GAP SERPL CALC-SCNC: 12 MMOL/L — SIGNIFICANT CHANGE UP (ref 5–17)
AST SERPL-CCNC: 32 U/L — SIGNIFICANT CHANGE UP (ref 10–40)
BILIRUB SERPL-MCNC: 0.3 MG/DL — SIGNIFICANT CHANGE UP (ref 0.2–1.2)
BUN SERPL-MCNC: 50 MG/DL — HIGH (ref 7–23)
CALCIUM SERPL-MCNC: 9.1 MG/DL — SIGNIFICANT CHANGE UP (ref 8.4–10.5)
CHLORIDE SERPL-SCNC: 98 MMOL/L — SIGNIFICANT CHANGE UP (ref 96–108)
CO2 SERPL-SCNC: 29 MMOL/L — SIGNIFICANT CHANGE UP (ref 22–31)
CREAT SERPL-MCNC: 1.24 MG/DL — SIGNIFICANT CHANGE UP (ref 0.5–1.3)
GLUCOSE SERPL-MCNC: 132 MG/DL — HIGH (ref 70–99)
HCT VFR BLD CALC: 43.1 % — SIGNIFICANT CHANGE UP (ref 34.5–45)
HGB BLD-MCNC: 13.6 G/DL — SIGNIFICANT CHANGE UP (ref 11.5–15.5)
MAGNESIUM SERPL-MCNC: 2.2 MG/DL — SIGNIFICANT CHANGE UP (ref 1.6–2.6)
MCHC RBC-ENTMCNC: 30.2 PG — SIGNIFICANT CHANGE UP (ref 27–34)
MCHC RBC-ENTMCNC: 31.6 GM/DL — LOW (ref 32–36)
MCV RBC AUTO: 95.8 FL — SIGNIFICANT CHANGE UP (ref 80–100)
PHOSPHATE SERPL-MCNC: 3.3 MG/DL — SIGNIFICANT CHANGE UP (ref 2.5–4.5)
PLATELET # BLD AUTO: 234 K/UL — SIGNIFICANT CHANGE UP (ref 150–400)
POTASSIUM SERPL-MCNC: 4.1 MMOL/L — SIGNIFICANT CHANGE UP (ref 3.5–5.3)
POTASSIUM SERPL-SCNC: 4.1 MMOL/L — SIGNIFICANT CHANGE UP (ref 3.5–5.3)
PROT SERPL-MCNC: 5.9 G/DL — LOW (ref 6–8.3)
RBC # BLD: 4.5 M/UL — SIGNIFICANT CHANGE UP (ref 3.8–5.2)
RBC # FLD: 13.7 % — SIGNIFICANT CHANGE UP (ref 10.3–14.5)
SODIUM SERPL-SCNC: 139 MMOL/L — SIGNIFICANT CHANGE UP (ref 135–145)
WBC # BLD: 12.34 K/UL — HIGH (ref 3.8–10.5)
WBC # FLD AUTO: 12.34 K/UL — HIGH (ref 3.8–10.5)

## 2019-11-29 PROCEDURE — 99233 SBSQ HOSP IP/OBS HIGH 50: CPT

## 2019-11-29 PROCEDURE — 93010 ELECTROCARDIOGRAM REPORT: CPT

## 2019-11-29 RX ORDER — POLYETHYLENE GLYCOL 3350 17 G/17G
17 POWDER, FOR SOLUTION ORAL
Qty: 0 | Refills: 0 | DISCHARGE
Start: 2019-11-29

## 2019-11-29 RX ORDER — SENNA PLUS 8.6 MG/1
2 TABLET ORAL
Qty: 60 | Refills: 0
Start: 2019-11-29 | End: 2019-12-28

## 2019-11-29 RX ORDER — AMLODIPINE BESYLATE 2.5 MG/1
1 TABLET ORAL
Qty: 30 | Refills: 0
Start: 2019-11-29 | End: 2019-12-28

## 2019-11-29 RX ORDER — ASPIRIN/CALCIUM CARB/MAGNESIUM 324 MG
81 TABLET ORAL DAILY
Refills: 0 | Status: DISCONTINUED | OUTPATIENT
Start: 2019-11-29 | End: 2019-11-30

## 2019-11-29 RX ORDER — FUROSEMIDE 40 MG
1 TABLET ORAL
Qty: 13 | Refills: 0
Start: 2019-11-29 | End: 2019-12-28

## 2019-11-29 RX ORDER — ATORVASTATIN CALCIUM 80 MG/1
1 TABLET, FILM COATED ORAL
Qty: 30 | Refills: 0
Start: 2019-11-29 | End: 2019-12-28

## 2019-11-29 RX ORDER — POLYETHYLENE GLYCOL 3350 17 G/17G
17 POWDER, FOR SOLUTION ORAL
Qty: 1 | Refills: 0
Start: 2019-11-29 | End: 2019-12-28

## 2019-11-29 RX ORDER — FUROSEMIDE 40 MG
40 TABLET ORAL ONCE
Refills: 0 | Status: COMPLETED | OUTPATIENT
Start: 2019-11-29 | End: 2019-11-29

## 2019-11-29 RX ORDER — ASPIRIN/CALCIUM CARB/MAGNESIUM 324 MG
1 TABLET ORAL
Qty: 30 | Refills: 0
Start: 2019-11-29 | End: 2019-12-28

## 2019-11-29 RX ORDER — SENNA PLUS 8.6 MG/1
2 TABLET ORAL
Qty: 0 | Refills: 0 | DISCHARGE
Start: 2019-11-29

## 2019-11-29 RX ORDER — ATORVASTATIN CALCIUM 80 MG/1
40 TABLET, FILM COATED ORAL AT BEDTIME
Refills: 0 | Status: DISCONTINUED | OUTPATIENT
Start: 2019-11-29 | End: 2019-11-30

## 2019-11-29 RX ADMIN — Medication 3 MILLILITER(S): at 23:01

## 2019-11-29 RX ADMIN — Medication 3 MILLILITER(S): at 11:36

## 2019-11-29 RX ADMIN — Medication 3 MILLILITER(S): at 06:27

## 2019-11-29 RX ADMIN — POLYETHYLENE GLYCOL 3350 17 GRAM(S): 17 POWDER, FOR SOLUTION ORAL at 06:27

## 2019-11-29 RX ADMIN — Medication 650 MILLIGRAM(S): at 17:09

## 2019-11-29 RX ADMIN — Medication 650 MILLIGRAM(S): at 11:36

## 2019-11-29 RX ADMIN — ENOXAPARIN SODIUM 30 MILLIGRAM(S): 100 INJECTION SUBCUTANEOUS at 11:36

## 2019-11-29 RX ADMIN — PANTOPRAZOLE SODIUM 40 MILLIGRAM(S): 20 TABLET, DELAYED RELEASE ORAL at 06:26

## 2019-11-29 RX ADMIN — Medication 1 SPRAY(S): at 17:10

## 2019-11-29 RX ADMIN — Medication 650 MILLIGRAM(S): at 17:38

## 2019-11-29 RX ADMIN — ATORVASTATIN CALCIUM 40 MILLIGRAM(S): 80 TABLET, FILM COATED ORAL at 23:00

## 2019-11-29 RX ADMIN — Medication 3 MILLIGRAM(S): at 23:00

## 2019-11-29 RX ADMIN — LIDOCAINE 1 PATCH: 4 CREAM TOPICAL at 23:00

## 2019-11-29 RX ADMIN — LIDOCAINE 1 PATCH: 4 CREAM TOPICAL at 19:00

## 2019-11-29 RX ADMIN — Medication 81 MILLIGRAM(S): at 17:09

## 2019-11-29 RX ADMIN — Medication 650 MILLIGRAM(S): at 12:05

## 2019-11-29 RX ADMIN — Medication 650 MILLIGRAM(S): at 06:26

## 2019-11-29 RX ADMIN — LIDOCAINE 1 PATCH: 4 CREAM TOPICAL at 11:35

## 2019-11-29 RX ADMIN — Medication 1 SPRAY(S): at 06:27

## 2019-11-29 RX ADMIN — Medication 40 MILLIGRAM(S): at 06:26

## 2019-11-29 RX ADMIN — Medication 3 MILLILITER(S): at 17:09

## 2019-11-29 RX ADMIN — PANTOPRAZOLE SODIUM 40 MILLIGRAM(S): 20 TABLET, DELAYED RELEASE ORAL at 17:09

## 2019-11-29 RX ADMIN — Medication 650 MILLIGRAM(S): at 23:01

## 2019-11-29 RX ADMIN — POLYETHYLENE GLYCOL 3350 17 GRAM(S): 17 POWDER, FOR SOLUTION ORAL at 17:10

## 2019-11-29 RX ADMIN — BUDESONIDE AND FORMOTEROL FUMARATE DIHYDRATE 2 PUFF(S): 160; 4.5 AEROSOL RESPIRATORY (INHALATION) at 17:11

## 2019-11-29 RX ADMIN — Medication 650 MILLIGRAM(S): at 23:45

## 2019-11-29 RX ADMIN — AMLODIPINE BESYLATE 5 MILLIGRAM(S): 2.5 TABLET ORAL at 06:26

## 2019-11-29 RX ADMIN — Medication 40 MILLIGRAM(S): at 15:14

## 2019-11-29 RX ADMIN — SENNA PLUS 2 TABLET(S): 8.6 TABLET ORAL at 23:01

## 2019-11-29 NOTE — DISCHARGE NOTE PROVIDER - INSTRUCTIONS
Regular diet Please continue a healthy and balanced diet that is low in sodium (salt) and cholesterol. Please keep your intake of carbohydrates (breads, pasta, rice) consistent. We recommend healthy or generous servings of fruits/vegetables (high-fiber containing foods)

## 2019-11-29 NOTE — DISCHARGE NOTE NURSING/CASE MANAGEMENT/SOCIAL WORK - PATIENT PORTAL LINK FT
You can access the FollowMyHealth Patient Portal offered by [facility] by registering at the following website: [email or web address]. By joining [employer]’s FollowMyHealth portal, you will also be able to view your health information using other applications (apps) compatible with our system.

## 2019-11-29 NOTE — DIETITIAN INITIAL EVALUATION ADULT. - PHYSICAL APPEARANCE
other (specify) Ht: 63 inches Wt: 129 pounds  BMI: 22.9 kg/m2 IBW: 115 pounds  (+/-10%) 112%IBW  Edema: none noted  Skin per nursing flowsheets: none noted

## 2019-11-29 NOTE — PHARMACOTHERAPY INTERVENTION NOTE - COMMENTS
Counseled patient and family members (patient declined  phone) on anticipated discharge medication doses, indications, and possible side effects. Provided medication cards and [employer] med essentials fact sheets in Maltese for all new medications.    Arin Hogan, PharmD   [phone]

## 2019-11-29 NOTE — DIETITIAN INITIAL EVALUATION ADULT. - ADD RECOMMEND
1. Continue diet as ordered. 2. Provided education on COPD nutrition therapy. Pt made aware RD to remain available for additional information PRN. 3. Monitor PO intake/tolerance, weights, labs, hydration status, bowels, and skin integrity.

## 2019-11-29 NOTE — DISCHARGE NOTE PROVIDER - NSDCHHHOMEBOUND_GEN_ALL_CORE
Shortness of breath with minimal ambulation/Chest pain/weakness during/after ambulation   greater than 20 feet/Pain greater than 7 on scale of 10 on ambulation

## 2019-11-29 NOTE — DISCHARGE NOTE PROVIDER - NSDCMRMEDTOKEN_GEN_ALL_CORE_FT
acetaminophen 325 mg oral tablet: 1 tablet orally every 6 hours as needed  albuterol 2.5 mg/3 mL (0.083%) inhalation solution: 3 milliliter(s) inhaled every 4 hours  omeprazole 20 mg oral delayed release capsule: 1 cap(s) orally once a day  polyethylene glycol 3350 oral powder for reconstitution: 17 gram(s) orally 2 times a day  ProAir HFA 90 mcg/inh inhalation aerosol: 2 puff(s) inhaled 4 times a day, As Needed  senna oral tablet: 2 tab(s) orally once a day (at bedtime)  Trelegy Ellipta inhalation powder: 1 puff(s) inhaled once a day acetaminophen 325 mg oral tablet: 1 tablet orally every 6 hours as needed  albuterol 2.5 mg/3 mL (0.083%) inhalation solution: 3 milliliter(s) inhaled every 4 hours  amLODIPine 5 mg oral tablet: 1 tab(s) orally once a day  aspirin 81 mg oral delayed release tablet: 1 tab(s) orally once a day  atorvastatin 40 mg oral tablet: 1 tab(s) orally once a day (at bedtime)  Lasix 40 mg oral tablet: 1 tab(s) orally Monday, Wednesday, and Friday   omeprazole 20 mg oral delayed release capsule: 1 cap(s) orally once a day  polyethylene glycol 3350 oral powder for reconstitution: 17 gram(s) orally 2 times a day as needed for constipation   predniSONE 10 mg oral tablet: Please take 3 tabs on 11/30  to 11/4, and then take 2 tabs on 12/5 to 12/9 and then take 1 tab on 12/10 to 12/14 to complete taper  ProAir HFA 90 mcg/inh inhalation aerosol: 2 puff(s) inhaled 4 times a day, As Needed  senna oral tablet: 2 tab(s) orally once a day (at bedtime) as needed for constipation   Trelegy Ellipta inhalation powder: 1 puff(s) inhaled once a day acetaminophen 325 mg oral tablet: 1 tablet orally every 6 hours as needed  albuterol 2.5 mg/3 mL (0.083%) inhalation solution: 3 milliliter(s) inhaled every 4 hours  amLODIPine 5 mg oral tablet: 1 tab(s) orally once a day  aspirin 81 mg oral delayed release tablet: 1 tab(s) orally once a day  atorvastatin 40 mg oral tablet: 1 tab(s) orally once a day (at bedtime)  Lasix 40 mg oral tablet: 1 tab(s) orally Monday, Wednesday, and Friday   omeprazole 20 mg oral delayed release capsule: 1 cap(s) orally once a day  polyethylene glycol 3350 oral powder for reconstitution: 17 gram(s) orally 2 times a day as needed for constipation   predniSONE 10 mg oral tablet: Please take 3 tabs on 12/1  to 11/4, and then take 2 tabs on 12/5 to 12/9 and then take 1 tab on 12/10 to 12/14 to complete taper  ProAir HFA 90 mcg/inh inhalation aerosol: 2 puff(s) inhaled 4 times a day, As Needed  senna oral tablet: 2 tab(s) orally once a day (at bedtime) as needed for constipation   Trelegy Ellipta inhalation powder: 1 puff(s) inhaled once a day

## 2019-11-29 NOTE — PROGRESS NOTE ADULT - SUBJECTIVE AND OBJECTIVE BOX
Patient is a 79y old  Female who presents with a chief complaint of COPD exacerbation (2019 10:50)                                                               INTERVAL HPI/OVERNIGHT EVENTS:    REVIEW OF SYSTEMS:     CONSTITUTIONAL: No weakness, fevers or chills  EYES/ENT: No visual changes , no ear ache   NECK: No pain or stiffness  RESPIRATORY: No cough, wheezing,  No shortness of breath  CARDIOVASCULAR: No chest pain or palpitations  GASTROINTESTINAL: No abdominal pain  . No nausea, vomiting, or hematemesis; No diarrhea or constipation. No melena or hematochezia.  GENITOURINARY: No dysuria, frequency or hematuria  NEUROLOGICAL: No numbness or weakness                                                                                                                                                                                                                                                                                 Medications:  MEDICATIONS  (STANDING):  acetaminophen   Tablet .. 650 milliGRAM(s) Oral every 6 hours  albuterol/ipratropium for Nebulization 3 milliLiter(s) Nebulizer every 6 hours  amLODIPine   Tablet 5 milliGRAM(s) Oral daily  budesonide 160 MICROgram(s)/formoterol 4.5 MICROgram(s) Inhaler 2 Puff(s) Inhalation two times a day  enoxaparin Injectable 30 milliGRAM(s) SubCutaneous daily  lidocaine   Patch 1 Patch Transdermal daily  melatonin 3 milliGRAM(s) Oral at bedtime  pantoprazole    Tablet 40 milliGRAM(s) Oral two times a day  polyethylene glycol 3350 17 Gram(s) Oral two times a day  predniSONE   Tablet 40 milliGRAM(s) Oral daily  senna 2 Tablet(s) Oral at bedtime  sodium chloride 0.65% Nasal 1 Spray(s) Both Nostrils two times a day    MEDICATIONS  (PRN):  bisacodyl Suppository 10 milliGRAM(s) Rectal daily PRN Constipation  ondansetron Injectable 4 milliGRAM(s) IV Push every 8 hours PRN Nausea and/or Vomitinga       Allergies    No Known Allergies    Intolerances      Vital Signs Last 24 Hrs  T(C): 36.7 (2019 05:20), Max: 36.7 (2019 05:20)  T(F): 98 (2019 05:20), Max: 98 (2019 05:20)  HR: 82 (2019 11:04) (73 - 85)  BP: 117/57 (2019 11:04) (117/57 - 144/66)  BP(mean): --  RR: 18 (2019 11:04) (18 - 19)  SpO2: 94% (2019 11:04) (90% - 94%)  CAPILLARY BLOOD GLUCOSE           @ 07:01  -   @ 07:00  --------------------------------------------------------  IN: 980 mL / OUT: 1500 mL / NET: -520 mL      Physical Exam:    Daily     Daily Weight in k.6 (2019 07:16)  General:  cachetic  HEENT:  Nonicteric, PERRLA  CV:  RRR, S1S2   Lungs:  carckles at bases   Abdomen:  Soft, non-tender, no distended, positive BS  Extremities:  2+ pulses, no c/c, no edema  Skin:  Warm and dry, no rashes  :  No dillon  Neuro:  AAOx3, non-focal, grossly intact                                                                                                                                                                                                                                                                                                LABS:                               13.6   12.34 )-----------( 234      ( 2019 08:19 )             43.1                          139  |  98  |  50<H>  ----------------------------<  132<H>  4.1   |  29  |  1.24    Ca    9.1      2019 06:07  Phos  3.3       Mg     2.2         TPro  5.9<L>  /  Alb  3.6  /  TBili  0.3  /  DBili  x   /  AST  32  /  ALT  77<H>  /  AlkPhos  160<H>

## 2019-11-29 NOTE — DISCHARGE NOTE PROVIDER - HOSPITAL COURSE
A/P) 80 y/o female PMH COPD on Home O2 recently admitted s/p mechanical fall with Right 7 and 8th rib fractures now presents with shortness of breath admitted with COPD exacerbation. Echo shows mild LV dysfunction with regional wall motion abnormality. Underlying COPD precluded NST. Seen and evaluated by Pulm and Cards. Patient treated with IV Solumedrol, now on PO Prednisone taper, bronchodilators, and intermittent IV Lasix with clinical improvement. With regional wall motion abnormality noted on Echo, patient s/p LHC showing mild CAD. Elevated LFT is likely 2/2 liver infiltration, which is improving. Patient remains stable for DC home with close followup with PCP, Pulm, Cards as per Dr. Fields A/P) 80 y/o female PMH COPD on Home O2 recently admitted s/p mechanical fall with Right 7 and 8th rib fractures now presents with shortness of breath admitted with COPD exacerbation. Echo shows mild LV dysfunction with regional wall motion abnormality. Underlying COPD precluded NST. CTA negative for PNA, PE. LE US negative for DVT. Seen and evaluated by Pulm and Cards. Patient treated with IV Solumedrol, now on PO Prednisone taper, bronchodilators, and intermittent IV Lasix with clinical improvement. With regional wall motion abnormality noted on Echo, patient s/p LHC showing mild CAD. Elevated LFT is likely 2/2 liver infiltration, which is improving. Patient remains stable for DC home with close followup with PCP, Pulm, Cards as per Dr. Fields A/P) 80 y/o female PMH COPD on Home O2 recently admitted s/p mechanical fall with Right 7 and 8th rib fractures now presents with shortness of breath admitted with COPD exacerbation. Echo shows mild LV dysfunction with regional wall motion abnormality. Underlying COPD precluded NST. CTA negative for PNA, PE. LE US negative for DVT. Seen and evaluated by Pulm and Jennifer. Patient treated with IV Solumedrol, now on PO Prednisone taper, bronchodilators, and intermittent IV Lasix with clinical improvement. With regional wall motion abnormality noted on Echo, patient s/p LHC showing mild CAD. Elevated LFT is likely 2/2 liver infiltration, which is improving. GOC is setup with palliative, patient now DNR/DNI. Patient and family want DC to home with home services. Patient remains stable for DC home with close followup with PCP, Pulm, Cards as per Dr. Fields

## 2019-11-29 NOTE — CHART NOTE - NSCHARTNOTEFT_GEN_A_CORE
Requested by Dr. Fields to check ambulation O2. Patient    Patient was on 4 L NC, O2 sat @ 93% at rest  87% with ambulating to door on 4L  Ambulating in [address] desat to 83% on increased O2 @ 6L  Ambulating back to room on 8L,  89%    Discussed with Dr. Fields,  Give Lasix 40 mg IVP x 1 monitor patient closely overnight  Re-check O2 level tomorrow with ambulation and decide the plan for DC depending on the result    HD stable  Will closely monitor    Sindhu Potts PA-C Requested by Dr. Fields to check ambulation O2.    Patient was on 4 L NC, O2 sat @ 93% at rest  87% with ambulating to door on 4L  Ambulating in [address] desat to 83% on increased O2 @ 6L  Ambulating back to room on 8L,  89%    Discussed with Dr. Fields,  Give Lasix 40 mg IVP x 1 monitor patient closely overnight  Re-check O2 level tomorrow with ambulation and decide the plan for DC depending on the result    HD stable  Will closely monitor    Sindhu Potts PA-C

## 2019-11-29 NOTE — CHART NOTE - NSCHARTNOTEFT_GEN_A_CORE
MEDICINE PA     EVENT SUMMARY   Notified by RN for atach for ~ 5 mins. Pt seen at the bedside. Pt is alert; asymptomatic tat the time of the event. Pt was asleep. pt denies CP, SOB, dyspnea, palpitations, dizziness, lightheadedness. Pt with previous episodes of atach for shorter intervals. Pt admitted for COPD exac on PO steroids; was found to have new L Bundle branch block s/p cath was found to have minor CAD.     MEDICATIONS  (STANDING):  acetaminophen   Tablet .. 650 milliGRAM(s) Oral every 6 hours  albuterol/ipratropium for Nebulization 3 milliLiter(s) Nebulizer every 6 hours  amLODIPine   Tablet 5 milliGRAM(s) Oral daily  budesonide 160 MICROgram(s)/formoterol 4.5 MICROgram(s) Inhaler 2 Puff(s) Inhalation two times a day  enoxaparin Injectable 30 milliGRAM(s) SubCutaneous daily  lidocaine   Patch 1 Patch Transdermal daily  melatonin 3 milliGRAM(s) Oral at bedtime  pantoprazole    Tablet 40 milliGRAM(s) Oral two times a day  polyethylene glycol 3350 17 Gram(s) Oral two times a day  predniSONE   Tablet 40 milliGRAM(s) Oral daily  senna 2 Tablet(s) Oral at bedtime  sodium chloride 0.65% Nasal 1 Spray(s) Both Nostrils two times a day    MEDICATIONS  (PRN):  bisacodyl Suppository 10 milliGRAM(s) Rectal daily PRN Constipation  ondansetron Injectable 4 milliGRAM(s) IV Push every 8 hours PRN Nausea and/or Vomitinga    ICU Vital Signs Last 24 Hrs  T(C): 36.7 (29 Nov 2019 05:20), Max: 36.7 (29 Nov 2019 05:20)  T(F): 98 (29 Nov 2019 05:20), Max: 98 (29 Nov 2019 05:20)  HR: 78 (29 Nov 2019 05:49) (73 - 85)  BP: 144/66 (29 Nov 2019 05:49) (133/66 - 144/66)  BP(mean): --  ABP: --  ABP(mean): --  RR: 18 (29 Nov 2019 05:49) (18 - 19)  SpO2: 90% (29 Nov 2019 05:49) (90% - 94%)    Basic Metabolic Panel in AM (11.28.19 @ 06:06)    Sodium, Serum: 139 mmol/L    Potassium, Serum: 5.0 mmol/L    Chloride, Serum: 100 mmol/L    Carbon Dioxide, Serum: 28 mmol/L    Anion Gap, Serum: 11 mmol/L    Blood Urea Nitrogen, Serum: 38 mg/dL    Creatinine, Serum: 1.20 mg/dL    Glucose, Serum: 161 mg/dL    Calcium, Total Serum: 9.8 mg/dL      Magnesium, Serum in AM (11.27.19 @ 06:57)    Magnesium, Serum: 2.4 mg/dL    A&P  78 y/o female PMH COPD on Home O2 recently admitted s/p mechanical fall with Right 7 and 8th rib fractures now presents with shortness of breath admitted with COPD exacerbation. Echo shows mild LV dysfunction with regional wall motion abnormality. Underlying COPD precluded NST    Atach  - c/w vitals and tele monitor  - BMP, Mg and phos; replete PRN  - EKG: NSR no acute changes compared to previous EKG  - F/u cardiology in AM; paged awaiting call back  Will continue to monitor.  Endorsed to the primary team    Tami MARLOW  #[id number]

## 2019-11-29 NOTE — PROGRESS NOTE ADULT - SUBJECTIVE AND OBJECTIVE BOX
SUBJECTIVE AND OBJECTIVE:  INTERVAL HPI/OVERNIGHT EVENTS:    DNR on chart: Yes    Allergies    No Known Allergies    Intolerances    MEDICATIONS  (STANDING):  acetaminophen   Tablet .. 650 milliGRAM(s) Oral every 6 hours  albuterol/ipratropium for Nebulization 3 milliLiter(s) Nebulizer every 6 hours  amLODIPine   Tablet 5 milliGRAM(s) Oral daily  aspirin enteric coated 81 milliGRAM(s) Oral daily  atorvastatin 40 milliGRAM(s) Oral at bedtime  budesonide 160 MICROgram(s)/formoterol 4.5 MICROgram(s) Inhaler 2 Puff(s) Inhalation two times a day  enoxaparin Injectable 30 milliGRAM(s) SubCutaneous daily  lidocaine   Patch 1 Patch Transdermal daily  melatonin 3 milliGRAM(s) Oral at bedtime  pantoprazole    Tablet 40 milliGRAM(s) Oral two times a day  polyethylene glycol 3350 17 Gram(s) Oral two times a day  predniSONE   Tablet 40 milliGRAM(s) Oral daily  senna 2 Tablet(s) Oral at bedtime  sodium chloride 0.65% Nasal 1 Spray(s) Both Nostrils two times a day    MEDICATIONS  (PRN):  bisacodyl Suppository 10 milliGRAM(s) Rectal daily PRN Constipation  ondansetron Injectable 4 milliGRAM(s) IV Push every 8 hours PRN Nausea and/or Vomitinga      ITEMS UNCHECKED ARE NOT PRESENT    PRESENT SYMPTOMS: [ ]Unable to obtain due to poor mentation   Source if other than patient:  [ ]Family   [ ]Team     Pain (Impact on QOL):    Location:  Minimal acceptable level (0-10 scale):                   Aggravating factors:  Quality:  Radiation:  Severity (0-10 scale):    Timing:    Dyspnea:                           [ ]Mild [ ]Moderate [ ]Severe  Anxiety:                             [ ]Mild [ ]Moderate [ ]Severe  Fatigue:                             [ ]Mild [ ]Moderate [ ]Severe  Nausea:                             [ ]Mild [ ]Moderate [ ]Severe  Loss of appetite:              [ ]Mild [ ]Moderate [ ]Severe  Constipation:                    [ ]Mild [ ]Moderate [ ]Severe    PAIN AD Score:	  [email or web address].[email or web address]/cog/painad.pdf (Ctrl + left click to view)    Other Symptoms:  [ ]All other review of systems negative     Karnofsky Performance Score/Palliative Performance Status Version 2:         %    http://palliative.info/resource_material/PPSv2.pdf  PHYSICAL EXAM:  Vital Signs Last 24 Hrs  T(C): 36.6 (29 Nov 2019 12:50), Max: 36.7 (29 Nov 2019 05:20)  T(F): 97.9 (29 Nov 2019 12:50), Max: 98 (29 Nov 2019 05:20)  HR: 80 (29 Nov 2019 12:50) (73 - 85)  BP: 122/64 (29 Nov 2019 12:50) (117/57 - 144/66)  BP(mean): --  RR: 20 (29 Nov 2019 14:05) (18 - 20)  SpO2: 89% (29 Nov 2019 14:05) (83% - 94%) I&O's Summary    28 Nov 2019 07:01  -  29 Nov 2019 07:00  --------------------------------------------------------  IN: 980 mL / OUT: 1500 mL / NET: -520 mL    29 Nov 2019 07:01  -  29 Nov 2019 17:19  --------------------------------------------------------  IN: 0 mL / OUT: 1500 mL / NET: -1500 mL     GENERAL:  [ ]Alert  [ ]Oriented x   [ ]Lethargic  [ ]Cachexia  [ ]Unarousable  [ ]Verbal  [ ]Non-Verbal  Behavioral:   [ ] Anxiety  [ ] Delirium [ ] Agitation [ ] Other  HEENT:  [ ]Normal   [ ]Dry mouth   [ ]ET Tube/Trach  [ ]Oral lesions  PULMONARY:   [ ]Clear [ ]Tachypnea  [ ]Audible excessive secretions   [ ]Rhonchi        [ ]Right [ ]Left [ ]Bilateral  [ ]Crackles        [ ]Right [ ]Left [ ]Bilateral  [ ]Wheezing     [ ]Right [ ]Left [ ]Bilateral  CARDIOVASCULAR:    [ ]Regular [ ]Irregular [ ]Tachy  [ ]Ronaldo [ ]Murmur [ ]Other  GASTROINTESTINAL:  [ ]Soft  [ ]Distended   [ ]+BS  [ ]Non tender [ ]Tender  [ ]PEG [ ]OGT/ NGT   Last BM:   11-26-19 @ 07:01 - 11-27-19 @ 07:00  --------------------------------------------------------  OUT: 1 mL     GENITOURINARY:  [ ]Normal [ ] Incontinent   [ ]Oliguria/Anuria   [ ]Lynne  MUSCULOSKELETAL:   [ ]Normal   [ ]Weakness  [ ]Bed/Wheelchair bound [ ]Edema  NEUROLOGIC:   [ ]No focal deficits  [ ] Cognitive impairment  [ ] Dysphagia [ ]Dysarthria [ ] Paresis [ ]Other   SKIN:   [ ]Normal   [ ]Pressure ulcer(s)  [ ]Rash    CRITICAL CARE:  [ ] Shock Present  [ ]Septic [ ]Cardiogenic [ ]Neurologic [ ]Hypovolemic  [ ]  Vasopressors [ ]  Inotropes   [ ] Respiratory failure present  [ ] Acute  [ ] Chronic [ ] Hypoxic  [ ] Hypercarbic [ ] Other  [ ] Other organ failure     LABS:                        13.6   12.34 )-----------( 234      ( 29 Nov 2019 08:19 )             43.1   11-29    139  |  98  |  50<H>  ----------------------------<  132<H>  4.1   |  29  |  1.24    Ca    9.1      29 Nov 2019 06:07  Phos  3.3     11-29  Mg     2.2     11-29    TPro  5.9<L>  /  Alb  3.6  /  TBili  0.3  /  DBili  x   /  AST  32  /  ALT  77<H>  /  AlkPhos  160<H>  11-29        RADIOLOGY & ADDITIONAL STUDIES:    Protein Calorie Malnutrition Present: [ ] yes [ ] no  [ ] PPSV2 < or = 30%  [ ] significant weight loss [ ] poor nutritional intake [ ] anasarca [ ] catabolic state Albumin, Serum: 3.6 g/dL (11-29-19 @ 06:07)  Artificial Nutrition [ ]     REFERRALS:   [ ]Chaplaincy  [ ] Hospice  [ ]Child Life  [ ]Social Work  [ ]Case management [ ]Holistic Therapy   Goals of Care Document: SUBJECTIVE AND OBJECTIVE: Patient seen and examined. Met with patient and daughter. Reviewed MOLST and HCP forms. Discussed MRI findings as well including need for follow up. Patient goals are to go home with home PT. Plan for discharge home hopefully today, with support from family with whom patient lives. Would want rehospitalization if needed    INTERVAL HPI/OVERNIGHT EVENTS: no acute overnight events    DNR on chart: Yes    Allergies    No Known Allergies    Intolerances    MEDICATIONS  (STANDING):  acetaminophen   Tablet .. 650 milliGRAM(s) Oral every 6 hours  albuterol/ipratropium for Nebulization 3 milliLiter(s) Nebulizer every 6 hours  amLODIPine   Tablet 5 milliGRAM(s) Oral daily  aspirin enteric coated 81 milliGRAM(s) Oral daily  atorvastatin 40 milliGRAM(s) Oral at bedtime  budesonide 160 MICROgram(s)/formoterol 4.5 MICROgram(s) Inhaler 2 Puff(s) Inhalation two times a day  enoxaparin Injectable 30 milliGRAM(s) SubCutaneous daily  lidocaine   Patch 1 Patch Transdermal daily  melatonin 3 milliGRAM(s) Oral at bedtime  pantoprazole    Tablet 40 milliGRAM(s) Oral two times a day  polyethylene glycol 3350 17 Gram(s) Oral two times a day  predniSONE   Tablet 40 milliGRAM(s) Oral daily  senna 2 Tablet(s) Oral at bedtime  sodium chloride 0.65% Nasal 1 Spray(s) Both Nostrils two times a day    MEDICATIONS  (PRN):  bisacodyl Suppository 10 milliGRAM(s) Rectal daily PRN Constipation  ondansetron Injectable 4 milliGRAM(s) IV Push every 8 hours PRN Nausea and/or Vomitinga      ITEMS UNCHECKED ARE NOT PRESENT    PRESENT SYMPTOMS: [ ]Unable to obtain due to poor mentation   Source if other than patient:  [ ]Family   [ ]Team     Pain (Impact on QOL):    Location:  Minimal acceptable level (0-10 scale):                   Aggravating factors:  Quality:  Radiation:  Severity (0-10 scale):    Timing:    Dyspnea:                           [x ]Mild [ ]Moderate [ ]Severe  Anxiety:                             [ ]Mild [ ]Moderate [ ]Severe  Fatigue:                             [ ]Mild [ ]Moderate [ ]Severe  Nausea:                             [ ]Mild [ ]Moderate [ ]Severe  Loss of appetite:              [ ]Mild [ ]Moderate [ ]Severe  Constipation:                    [ ]Mild [ ]Moderate [ ]Severe    PAIN AD Score:	  http://geriatrictoolkit.[facility]/cog/painad.pdf (Ctrl + left click to view)    Other Symptoms:  [ x]All other review of systems negative     Karnofsky Performance Score/Palliative Performance Status Version 2:       50-60  %    http://palliative.info/resource_material/PPSv2.pdf  PHYSICAL EXAM:  Vital Signs Last 24 Hrs  T(C): 36.6 (29 Nov 2019 12:50), Max: 36.7 (29 Nov 2019 05:20)  T(F): 97.9 (29 Nov 2019 12:50), Max: 98 (29 Nov 2019 05:20)  HR: 80 (29 Nov 2019 12:50) (73 - 85)  BP: 122/64 (29 Nov 2019 12:50) (117/57 - 144/66)  BP(mean): --  RR: 20 (29 Nov 2019 14:05) (18 - 20)  SpO2: 89% (29 Nov 2019 14:05) (83% - 94%) I&O's Summary    28 Nov 2019 07:01  -  29 Nov 2019 07:00  --------------------------------------------------------  IN: 980 mL / OUT: 1500 mL / NET: -520 mL    29 Nov 2019 07:01  -  29 Nov 2019 17:19  --------------------------------------------------------  IN: 0 mL / OUT: 1500 mL / NET: -1500 mL     GENERAL: [place]ese speaking  [x ]Alert  [x ]Oriented x3   [ ]Lethargic  [ ]Cachexia  [ ]Unarousable  [ ]Verbal  [ ]Non-Verbal  Behavioral:   [ ] Anxiety  [ ] Delirium [ ] Agitation [ ] Other  HEENT:  [ ]Normal   [x ]Dry mouth   [ ]ET Tube/Trach  [ ]Oral lesions  PULMONARY:   [ ]Clear [ ]Tachypnea  [ ]Audible excessive secretions   [ ]Rhonchi        [ ]Right [ ]Left [ ]Bilateral  [ x]Crackles        [ ]Right [ ]Left [ ]Bilateral  [ ]Wheezing     [ ]Right [ ]Left [ ]Bilateral  CARDIOVASCULAR:    [x ]Regular [ ]Irregular [ ]Tachy  [ ]Ronaldo [ ]Murmur [ ]Other  GASTROINTESTINAL:  [x ]Soft  [ ]Distended   [ x]+BS  [ x]Non tender [ ]Tender  [ ]PEG [ ]OGT/ NGT   Last BM:    GENITOURINARY:  [x ]Normal [ ] Incontinent   [ ]Oliguria/Anuria   [ ]Lynne  MUSCULOSKELETAL:   [ ]Normal   [ x]Weakness  [ ]Bed/Wheelchair bound [ ]Edema  NEUROLOGIC:   [x ]No focal deficits  [ ] Cognitive impairment  [ ] Dysphagia [ ]Dysarthria [ ] Paresis [ ]Other   SKIN: ecchymoses  [ ]Normal   [ ]Pressure ulcer(s)  [ ]Rash    CRITICAL CARE:  [ ] Shock Present  [ ]Septic [ ]Cardiogenic [ ]Neurologic [ ]Hypovolemic  [ ]  Vasopressors [ ]  Inotropes   [ ] Respiratory failure present  [ ] Acute  [ ] Chronic [ ] Hypoxic  [ ] Hypercarbic [ ] Other  [ ] Other organ failure     LABS:                        13.6   12.34 )-----------( 234      ( 29 Nov 2019 08:19 )             43.1   11-29    139  |  98  |  50<H>  ----------------------------<  132<H>  4.1   |  29  |  1.24    Ca    9.1      29 Nov 2019 06:07  Phos  3.3     11-29  Mg     2.2     11-29    TPro  5.9<L>  /  Alb  3.6  /  TBili  0.3  /  DBili  x   /  AST  32  /  ALT  77<H>  /  AlkPhos  160<H>  11-29        RADIOLOGY & ADDITIONAL STUDIES: no new imaging studies    Protein Calorie Malnutrition Present: [ ] yes [ x] no  [ ] PPSV2 < or = 30%  [ ] significant weight loss [ ] poor nutritional intake [ ] anasarca [ ] catabolic state Albumin, Serum: 3.6 g/dL (11-29-19 @ 06:07)  Artificial Nutrition [ ]     REFERRALS:   [ ]Chaplaincy  [ ] Hospice  [ ]Child Life  [ ]Social Work  [ x]Case management [ ]Holistic Therapy   Goals of Care Document:

## 2019-11-29 NOTE — DIETITIAN INITIAL EVALUATION ADULT. - OTHER INFO
"80 y/o female with hx of COPD on Home O2  discharged two days ago when she was admitted s/p mechanical fall resulting in fracture of R 7 and 8 th ribs.. was admitted to SICU for observation and was hemodynmaically stable... during that hospitalization she was found to have a liver lesion"    Pt Japanese speaking, Son at bedside interpreted. Pt notes good appetite and PO intake in-house and PTA; notes slight decreased appetite PTA 2/2 COPD exacerbation, but notes that she is eating very well now. Briefly provided COPD diet education, providing tips to optimize PO intake when having exacerbation. Notes that her UBW is 130 pounds +/- 2 pounds, and has been stable for a while; pt standing weight currently 129 pounds. Admits to drinking Ensure sometimes at home, declined supplement at this time. Declined Nutrition focused physical exam, noting she is leaving tomorrow. Denies N+V, diarrhea/constipation; no chewing/swallowing difficulties. NKFA. Denies micronutrient supplementation PTA.

## 2019-11-29 NOTE — PROGRESS NOTE ADULT - SUBJECTIVE AND OBJECTIVE BOX
S: Denies chest pain or SOB. Review of systems otherwise (-)      MEDICATIONS  (STANDING):  acetaminophen   Tablet .. 650 milliGRAM(s) Oral every 6 hours  albuterol/ipratropium for Nebulization 3 milliLiter(s) Nebulizer every 6 hours  amLODIPine   Tablet 5 milliGRAM(s) Oral daily  budesonide 160 MICROgram(s)/formoterol 4.5 MICROgram(s) Inhaler 2 Puff(s) Inhalation two times a day  enoxaparin Injectable 30 milliGRAM(s) SubCutaneous daily  lidocaine   Patch 1 Patch Transdermal daily  melatonin 3 milliGRAM(s) Oral at bedtime  pantoprazole    Tablet 40 milliGRAM(s) Oral two times a day  polyethylene glycol 3350 17 Gram(s) Oral two times a day  predniSONE   Tablet 40 milliGRAM(s) Oral daily  senna 2 Tablet(s) Oral at bedtime  sodium chloride 0.65% Nasal 1 Spray(s) Both Nostrils two times a day    MEDICATIONS  (PRN):  bisacodyl Suppository 10 milliGRAM(s) Rectal daily PRN Constipation  ondansetron Injectable 4 milliGRAM(s) IV Push every 8 hours PRN Nausea and/or Vomitinga      LABS:                            13.6   12.34 )-----------( 234      ( 29 Nov 2019 08:19 )             43.1     Hemoglobin: 13.6 g/dL (11-29 @ 08:19)  Hemoglobin: 13.2 g/dL (11-28 @ 09:05)  Hemoglobin: 13.5 g/dL (11-26 @ 08:41)  Hemoglobin: 12.7 g/dL (11-25 @ 07:52)    11-29    139  |  98  |  50<H>  ----------------------------<  132<H>  4.1   |  29  |  1.24    Ca    9.1      29 Nov 2019 06:07  Phos  3.3     11-29  Mg     2.2     11-29    TPro  5.9<L>  /  Alb  3.6  /  TBili  0.3  /  DBili  x   /  AST  32  /  ALT  77<H>  /  AlkPhos  160<H>  11-29    Creatinine Trend: 1.24<--, 1.20<--, 1.09<--, 0.93<--, 0.96<--, 0.93<--             11-28-19 @ 07:01  -  11-29-19 @ 07:00  --------------------------------------------------------  IN: 980 mL / OUT: 1500 mL / NET: -520 mL        PHYSICAL EXAM  Vital Signs Last 24 Hrs  T(C): 36.7 (29 Nov 2019 05:20), Max: 36.7 (29 Nov 2019 05:20)  T(F): 98 (29 Nov 2019 05:20), Max: 98 (29 Nov 2019 05:20)  HR: 82 (29 Nov 2019 11:04) (73 - 85)  BP: 117/57 (29 Nov 2019 11:04) (117/57 - 144/66)  BP(mean): --  RR: 18 (29 Nov 2019 11:04) (18 - 19)  SpO2: 94% (29 Nov 2019 11:04) (90% - 94%)    Gen: Appears well in NAD  HEENT:  (-)icterus (-)pallor  CV: N S1 S2 1/6 MARIE (+)2 Pulses B/l  Resp:  Clear to auscultation B/L, normal effort  GI: (+) BS Soft, NT, ND  Lymph:  (-)Edema, (-)obvious lymphadenopathy  Skin: Warm to touch, Normal turgor  Psych: Appropriate mood and affect    Tele: SR 60-70, Atach    echo: mild LV dysfunction  Cath cardiac : mild luminal irregularities       A/P) 80 y/o female PMH COPD on Home O2 recently admitted s/p mechanical fall with Right 7 and 8th rib fractures now presents with shortness of breath admitted with COPD exacerbation. Echo shows mild LV dysfunction with regional wall motion abnormality. Underlying COPD precluded NST. Now s/p cath with mild non-obstructive CAD.    - Recommend medical management of CAD - start ASA/Statin if no contraindications  - Atach noted - would consider low dose beta blocker when off of steroids  - Pulm f/u  - Palliative f/u per primary team  - No further inpatient cardiac w/u needed at this time  - Will arrange f/u in our office prior to discharge    Dirk Olsen PA-C  [facility]  Pager: [phone] S: Denies chest pain or SOB. Review of systems otherwise (-)      MEDICATIONS  (STANDING):  acetaminophen   Tablet .. 650 milliGRAM(s) Oral every 6 hours  albuterol/ipratropium for Nebulization 3 milliLiter(s) Nebulizer every 6 hours  amLODIPine   Tablet 5 milliGRAM(s) Oral daily  budesonide 160 MICROgram(s)/formoterol 4.5 MICROgram(s) Inhaler 2 Puff(s) Inhalation two times a day  enoxaparin Injectable 30 milliGRAM(s) SubCutaneous daily  lidocaine   Patch 1 Patch Transdermal daily  melatonin 3 milliGRAM(s) Oral at bedtime  pantoprazole    Tablet 40 milliGRAM(s) Oral two times a day  polyethylene glycol 3350 17 Gram(s) Oral two times a day  predniSONE   Tablet 40 milliGRAM(s) Oral daily  senna 2 Tablet(s) Oral at bedtime  sodium chloride 0.65% Nasal 1 Spray(s) Both Nostrils two times a day    MEDICATIONS  (PRN):  bisacodyl Suppository 10 milliGRAM(s) Rectal daily PRN Constipation  ondansetron Injectable 4 milliGRAM(s) IV Push every 8 hours PRN Nausea and/or Vomitinga      LABS:                            13.6   12.34 )-----------( 234      ( 29 Nov 2019 08:19 )             43.1     Hemoglobin: 13.6 g/dL (11-29 @ 08:19)  Hemoglobin: 13.2 g/dL (11-28 @ 09:05)  Hemoglobin: 13.5 g/dL (11-26 @ 08:41)  Hemoglobin: 12.7 g/dL (11-25 @ 07:52)    11-29    139  |  98  |  50<H>  ----------------------------<  132<H>  4.1   |  29  |  1.24    Ca    9.1      29 Nov 2019 06:07  Phos  3.3     11-29  Mg     2.2     11-29    TPro  5.9<L>  /  Alb  3.6  /  TBili  0.3  /  DBili  x   /  AST  32  /  ALT  77<H>  /  AlkPhos  160<H>  11-29    Creatinine Trend: 1.24<--, 1.20<--, 1.09<--, 0.93<--, 0.96<--, 0.93<--             11-28-19 @ 07:01  -  11-29-19 @ 07:00  --------------------------------------------------------  IN: 980 mL / OUT: 1500 mL / NET: -520 mL        PHYSICAL EXAM  Vital Signs Last 24 Hrs  T(C): 36.7 (29 Nov 2019 05:20), Max: 36.7 (29 Nov 2019 05:20)  T(F): 98 (29 Nov 2019 05:20), Max: 98 (29 Nov 2019 05:20)  HR: 82 (29 Nov 2019 11:04) (73 - 85)  BP: 117/57 (29 Nov 2019 11:04) (117/57 - 144/66)  BP(mean): --  RR: 18 (29 Nov 2019 11:04) (18 - 19)  SpO2: 94% (29 Nov 2019 11:04) (90% - 94%)    Gen: Appears well in NAD  HEENT:  (-)icterus (-)pallor  CV: N S1 S2 1/6 MARIE (+)2 Pulses B/l  Resp:  Clear to auscultation B/L, normal effort  GI: (+) BS Soft, NT, ND  Lymph:  (-)Edema, (-)obvious lymphadenopathy  Skin: Warm to touch, Normal turgor  Psych: Appropriate mood and affect    Tele: SR 60-70, Atach    echo: mild LV dysfunction  Cath cardiac : mild luminal irregularities       A/P) 78 y/o female PMH COPD on Home O2 recently admitted s/p mechanical fall with Right 7 and 8th rib fractures now presents with shortness of breath admitted with COPD exacerbation. Echo shows mild LV dysfunction with regional wall motion abnormality. Underlying COPD precluded NST. Now s/p cath with mild non-obstructive CAD.    - Recommend medical management of CAD - start ASA/Statin if no contraindications  - Atach noted - would consider low dose beta blocker when off of steroids  - Pulm f/u  - Palliative f/u per primary team  - No further inpatient cardiac w/u needed at this time  - Patient to follow up in our [place] office with Dr. Alejandro on Friday 12/6 at 12:30 PM ([address], [address], [place] [address] - Office #[phone])    Dirk Olsen PA-C  [facility]  Pager: [phone]

## 2019-11-29 NOTE — DISCHARGE NOTE PROVIDER - CARE PROVIDER_API CALL
Arturo Yee  Phone: [phone]  Fax: (   )    -  Follow Up Time: 1-3 days Arturo Yee  Phone: [phone]  Fax: (   )    -  Follow Up Time: 1-3 days    Arturo Rose (DO)  Critical Care Medicine; Internal Medicine; Pulmonary Disease  [address] [place] [address]  Phone: [phone]  Fax: [phone]  Follow Up Time: 1-3 days

## 2019-11-29 NOTE — DIETITIAN INITIAL EVALUATION ADULT. - PERTINENT MEDS FT
MEDICATIONS  (STANDING):  acetaminophen   Tablet .. 650 milliGRAM(s) Oral every 6 hours  albuterol/ipratropium for Nebulization 3 milliLiter(s) Nebulizer every 6 hours  amLODIPine   Tablet 5 milliGRAM(s) Oral daily  aspirin enteric coated 81 milliGRAM(s) Oral daily  atorvastatin 40 milliGRAM(s) Oral at bedtime  budesonide 160 MICROgram(s)/formoterol 4.5 MICROgram(s) Inhaler 2 Puff(s) Inhalation two times a day  enoxaparin Injectable 30 milliGRAM(s) SubCutaneous daily  lidocaine   Patch 1 Patch Transdermal daily  melatonin 3 milliGRAM(s) Oral at bedtime  pantoprazole    Tablet 40 milliGRAM(s) Oral two times a day  polyethylene glycol 3350 17 Gram(s) Oral two times a day  predniSONE   Tablet 40 milliGRAM(s) Oral daily  senna 2 Tablet(s) Oral at bedtime  sodium chloride 0.65% Nasal 1 Spray(s) Both Nostrils two times a day

## 2019-11-29 NOTE — DISCHARGE NOTE PROVIDER - NSDCFUADDAPPT_GEN_ALL_CORE_FT
Please follow up in with Cardiology, Dr. Alejandro on Friday 12/6 at 12:30 PM    : [address], [address], [place] [address] - Office #[phone]  Please follow up with PCP, Dr. Yee in 1-3 days to repeat blood work and have your INR check. Please follow up in with Cardiology, Dr. Alejandro on Friday 12/6 at 12:30 PM    : [address], [address], [place] [address] - Office #[phone]  Please follow up with PCP, Pulm, Dr. Rose in 1-3 days to repeat blood work and have your INR check. Please follow up in with Cardiology, Dr. Alejandro on Friday 12/6 at 12:30 PM    : [address], [address], [place] [address] - Office #[phone]  Please follow up with PCP, Pulm, Dr. Rose in 1-3 days to repeat blood work and further management

## 2019-11-29 NOTE — PROGRESS NOTE ADULT - SUBJECTIVE AND OBJECTIVE BOX
PULMONARY/OUTPATIENT MEDICINE PROGRESS NOTE:    INTERVAL HPI: OOB with family at her bedside. Looks great. No cough/congestion. Hypoxia noted. Given lasix. Pain improved.     MEDICATIONS  (STANDING):  acetaminophen   Tablet .. 650 milliGRAM(s) Oral every 6 hours  albuterol/ipratropium for Nebulization 3 milliLiter(s) Nebulizer every 6 hours  amLODIPine   Tablet 5 milliGRAM(s) Oral daily  aspirin enteric coated 81 milliGRAM(s) Oral daily  atorvastatin 40 milliGRAM(s) Oral at bedtime  budesonide 160 MICROgram(s)/formoterol 4.5 MICROgram(s) Inhaler 2 Puff(s) Inhalation two times a day  enoxaparin Injectable 30 milliGRAM(s) SubCutaneous daily  lidocaine   Patch 1 Patch Transdermal daily  melatonin 3 milliGRAM(s) Oral at bedtime  pantoprazole    Tablet 40 milliGRAM(s) Oral two times a day  polyethylene glycol 3350 17 Gram(s) Oral two times a day  predniSONE   Tablet 40 milliGRAM(s) Oral daily  senna 2 Tablet(s) Oral at bedtime  sodium chloride 0.65% Nasal 1 Spray(s) Both Nostrils two times a day    MEDICATIONS  (PRN):  bisacodyl Suppository 10 milliGRAM(s) Rectal daily PRN Constipation  ondansetron Injectable 4 milliGRAM(s) IV Push every 8 hours PRN Nausea and/or Vomitinga    No Known Allergies    REVIEW OF SYSTEMS:  CONSTITUTIONAL:  Negative for fever, chills, or night sweats  CARDIOVASCULAR:  Negative for chest pain or palpitations    RESPIRATORY:  Negative for cough, Improved dyspnea, no wheezing   GASTROINTESTINAL:  Negative for nausea, vomiting, diarrhea, or constipation         Vital Signs Last 24 Hrs  T(C): 36.6 (29 Nov 2019 12:50), Max: 36.7 (29 Nov 2019 05:20)  T(F): 97.9 (29 Nov 2019 12:50), Max: 98 (29 Nov 2019 05:20)  HR: 80 (29 Nov 2019 12:50) (73 - 85)  BP: 122/64 (29 Nov 2019 12:50) (117/57 - 144/66)  BP(mean): --  RR: 20 (29 Nov 2019 14:05) (18 - 20)  SpO2: 89% (29 Nov 2019 14:05) (83% - 94%)  I&O's Summary    28 Nov 2019 07:01  -  29 Nov 2019 07:00  --------------------------------------------------------  IN: 980 mL / OUT: 1500 mL / NET: -520 mL    29 Nov 2019 07:01  -  29 Nov 2019 16:33  --------------------------------------------------------  IN: 0 mL / OUT: 1500 mL / NET: -1500 mL      GENERAL: Appears comfortable  NECK: Supple,  without JVD, bruit, adenopathy, or thyromegaly  CARDIOVASCULAR: Normal rate/rhythm. No murmurs  RESPIRATORY: Normal breath sounds without rales, rhonchi, or wheezes.     GASTROINTESTINAL: Normal bowel sounds.  No masses or tenderness     EXTREMITIES: No clubbing, cyanosis, or edma       LABS:                        13.6   12.34 )-----------( 234      ( 29 Nov 2019 08:19 )             43.1     11-29    139  |  98  |  50<H>  ----------------------------<  132<H>  4.1   |  29  |  1.24    Ca    9.1      29 Nov 2019 06:07  Phos  3.3     11-29  Mg     2.2     11-29    TPro  5.9<L>  /  Alb  3.6  /  TBili  0.3  /  DBili  x   /  AST  32  /  ALT  77<H>  /  AlkPhos  160<H>  11-29    Assessment:  ?Retained secretions/Exacerbation - Improved   Recent right 7,8 rib fractures  Severe COPD  Now back to near her baseline but hypoxic    Plan:  02 as needed  Continue Prednisone 40 mg daukt  Pulmonary toilet reviewed. Lidocaine patch will help (OTC on discharge)  She had N/V with Tramadol apparently  Tylenol/Motrin  DVT prophylaxis  GI follow up as outpatient for her pancreatic lesions but doubt we will pursue them given her medical condition  Trelegy/Nebs on discharge  Hopefully home in AM as long as her Sa02 is at least mid 80's. She should continue to improve with time.   Follow up in office next week with Dr. Milton Maier MD, FACP, [address] [address] [phone]

## 2019-11-29 NOTE — DIETITIAN INITIAL EVALUATION ADULT. - PERTINENT LABORATORY DATA
11-29 Na139 mmol/L Glu 132 mg/dL<H> K+ 4.1 mmol/L Cr  1.24 mg/dL BUN 50 mg/dL<H> Phos 3.3 mg/dL Alb 3.6 g/dL PAB n/a

## 2019-11-29 NOTE — PROGRESS NOTE ADULT - ASSESSMENT
80 y/o female with hx of COPD on Home O2  discharged two days ago when she was admitted s/p mechanical fall resulting in fracture of R 7 and 8 th ribs.. was admitted to SICU for observation and was hemodynmaically stable... during that hospitalization she was found to have a liver lesion : w/U with CT and MRI showed fatty infiltration .. 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 pt was discharged home and now presenting with SOB /N/V.  reports worsening SOB from her baseline... pain in chest at the site of fracture has not changed.. +productive cough of yellow sputum.. no fever or chills ..  had two episodes of N/V NB  no abd pain.  no BM for 6 days  no urianry sx   C/O BLE pain in calf : no swelling at this time   no neuro complaints   labs : neg trop *2  EKG : new LBBB compared to 2016    RVP neg   EKG CTA neg     - acute hpoxic respiratory failure sec to COPD exacerrbation:    improved with BIPAP and now on NC   still with hypoxia upon exertion .. requiring higher O2   cont  nebs and steroids very slow taper   pul f/u appreciated   possible elemnt of Diastoic CHF ( mild ) crackles on exam .. responds to lasix .. will give three times a week      - LE pain : Duplex neg for DVT     - New EKG findings with LBB .. discussed with cardio : no sginifacnt CAD     medical management    - N/V: tolerating po   monitor and add prn zofran     - constipation : bowel regimen     - rib Fx : IS and monitor   pain meds     - elevated LFT :  likely sec to liver infiltration reported during last admit   monitor for now       again discussed GOC however pt and daughter still indecisive.. palliative meetingh with family re: GOC    likely dc today

## 2019-11-29 NOTE — DISCHARGE NOTE PROVIDER - NSDCCPCAREPLAN_GEN_ALL_CORE_FT
PRINCIPAL DISCHARGE DIAGNOSIS  Diagnosis: COPD exacerbation  Assessment and Plan of Treatment: Call your Health Care provider upon arrival home to make a follow up appointment within one week.  Take all inhalers as prescribed by your Health Care Provider.  Take steroids as prescribed by your Health Care Provider.  If your cough increases infrequency and severity and/or you have shortness of breath or increased shortness of breath call your Health Care Provider.  If you develop fever, chills, night sweats, malaise, and/or change in mental status call your Health care Provider.  Nutrition is very important.  Eat small frequent meals.  Increase your activity as tolerated.  Do not stay in bed all day

## 2019-11-29 NOTE — DISCHARGE NOTE NURSING/CASE MANAGEMENT/SOCIAL WORK - NSDCFUADDAPPT_GEN_ALL_CORE_FT
Please follow up in with Cardiology, Dr. Alejandro on Friday 12/6 at 12:30 PM    : [address], [address], [place] [address] - Office #[phone]  Please follow up with PCP, Pulm, Dr. Rose in 1-3 days to repeat blood work and further management

## 2019-11-29 NOTE — DISCHARGE NOTE PROVIDER - PROVIDER TOKENS
FREE:[LAST:[Joselito],FIRST:[Arturo],PHONE:[phone]:[(   )    -],FOLLOWUP:[1-3 days]] FREE:[LAST:[Genjosuese],FIRST:[Arturo],PHONE:[phone]],FAX:[(   )    -],FOLLOWUP:[1-3 days]],PROVIDER:[TOKEN:[[id number]],FOLLOWUP:[1-3 days]]

## 2019-11-30 VITALS
OXYGEN SATURATION: 92 % | DIASTOLIC BLOOD PRESSURE: 60 MMHG | TEMPERATURE: 98 F | HEART RATE: 89 BPM | SYSTOLIC BLOOD PRESSURE: 108 MMHG | RESPIRATION RATE: 20 BRPM

## 2019-11-30 LAB
ANION GAP SERPL CALC-SCNC: 16 MMOL/L — SIGNIFICANT CHANGE UP (ref 5–17)
BUN SERPL-MCNC: 34 MG/DL — HIGH (ref 7–23)
CALCIUM SERPL-MCNC: 9.7 MG/DL — SIGNIFICANT CHANGE UP (ref 8.4–10.5)
CHLORIDE SERPL-SCNC: 93 MMOL/L — LOW (ref 96–108)
CO2 SERPL-SCNC: 27 MMOL/L — SIGNIFICANT CHANGE UP (ref 22–31)
CREAT SERPL-MCNC: 0.94 MG/DL — SIGNIFICANT CHANGE UP (ref 0.5–1.3)
GLUCOSE SERPL-MCNC: 236 MG/DL — HIGH (ref 70–99)
MAGNESIUM SERPL-MCNC: 2.4 MG/DL — SIGNIFICANT CHANGE UP (ref 1.6–2.6)
PHOSPHATE SERPL-MCNC: 2.9 MG/DL — SIGNIFICANT CHANGE UP (ref 2.5–4.5)
POTASSIUM SERPL-MCNC: 5 MMOL/L — SIGNIFICANT CHANGE UP (ref 3.5–5.3)
POTASSIUM SERPL-SCNC: 5 MMOL/L — SIGNIFICANT CHANGE UP (ref 3.5–5.3)
SODIUM SERPL-SCNC: 136 MMOL/L — SIGNIFICANT CHANGE UP (ref 135–145)

## 2019-11-30 PROCEDURE — 87186 SC STD MICRODIL/AGAR DIL: CPT

## 2019-11-30 PROCEDURE — 87581 M.PNEUMON DNA AMP PROBE: CPT

## 2019-11-30 PROCEDURE — 82565 ASSAY OF CREATININE: CPT

## 2019-11-30 PROCEDURE — 93970 EXTREMITY STUDY: CPT

## 2019-11-30 PROCEDURE — 97530 THERAPEUTIC ACTIVITIES: CPT

## 2019-11-30 PROCEDURE — 82803 BLOOD GASES ANY COMBINATION: CPT

## 2019-11-30 PROCEDURE — 84484 ASSAY OF TROPONIN QUANT: CPT

## 2019-11-30 PROCEDURE — 97116 GAIT TRAINING THERAPY: CPT

## 2019-11-30 PROCEDURE — 96374 THER/PROPH/DIAG INJ IV PUSH: CPT | Mod: XU

## 2019-11-30 PROCEDURE — C1894: CPT

## 2019-11-30 PROCEDURE — 96361 HYDRATE IV INFUSION ADD-ON: CPT | Mod: XU

## 2019-11-30 PROCEDURE — 85014 HEMATOCRIT: CPT

## 2019-11-30 PROCEDURE — 99285 EMERGENCY DEPT VISIT HI MDM: CPT | Mod: 25

## 2019-11-30 PROCEDURE — 87798 DETECT AGENT NOS DNA AMP: CPT

## 2019-11-30 PROCEDURE — 93306 TTE W/DOPPLER COMPLETE: CPT

## 2019-11-30 PROCEDURE — 84100 ASSAY OF PHOSPHORUS: CPT

## 2019-11-30 PROCEDURE — C1769: CPT

## 2019-11-30 PROCEDURE — 82435 ASSAY OF BLOOD CHLORIDE: CPT

## 2019-11-30 PROCEDURE — 82330 ASSAY OF CALCIUM: CPT

## 2019-11-30 PROCEDURE — 87633 RESP VIRUS 12-25 TARGETS: CPT

## 2019-11-30 PROCEDURE — 87486 CHLMYD PNEUM DNA AMP PROBE: CPT

## 2019-11-30 PROCEDURE — 81001 URINALYSIS AUTO W/SCOPE: CPT

## 2019-11-30 PROCEDURE — 71275 CT ANGIOGRAPHY CHEST: CPT

## 2019-11-30 PROCEDURE — 82550 ASSAY OF CK (CPK): CPT

## 2019-11-30 PROCEDURE — 71045 X-RAY EXAM CHEST 1 VIEW: CPT

## 2019-11-30 PROCEDURE — 97161 PT EVAL LOW COMPLEX 20 MIN: CPT

## 2019-11-30 PROCEDURE — 83735 ASSAY OF MAGNESIUM: CPT

## 2019-11-30 PROCEDURE — 85027 COMPLETE CBC AUTOMATED: CPT

## 2019-11-30 PROCEDURE — 83880 ASSAY OF NATRIURETIC PEPTIDE: CPT

## 2019-11-30 PROCEDURE — 93454 CORONARY ARTERY ANGIO S&I: CPT

## 2019-11-30 PROCEDURE — 36600 WITHDRAWAL OF ARTERIAL BLOOD: CPT

## 2019-11-30 PROCEDURE — C1887: CPT

## 2019-11-30 PROCEDURE — 94640 AIRWAY INHALATION TREATMENT: CPT

## 2019-11-30 PROCEDURE — 87040 BLOOD CULTURE FOR BACTERIA: CPT

## 2019-11-30 PROCEDURE — 93005 ELECTROCARDIOGRAM TRACING: CPT

## 2019-11-30 PROCEDURE — 83605 ASSAY OF LACTIC ACID: CPT

## 2019-11-30 PROCEDURE — 82272 OCCULT BLD FECES 1-3 TESTS: CPT

## 2019-11-30 PROCEDURE — 82962 GLUCOSE BLOOD TEST: CPT

## 2019-11-30 PROCEDURE — 96375 TX/PRO/DX INJ NEW DRUG ADDON: CPT | Mod: XU

## 2019-11-30 PROCEDURE — 80048 BASIC METABOLIC PNL TOTAL CA: CPT

## 2019-11-30 PROCEDURE — 94660 CPAP INITIATION&MGMT: CPT

## 2019-11-30 PROCEDURE — 84295 ASSAY OF SERUM SODIUM: CPT

## 2019-11-30 PROCEDURE — 80053 COMPREHEN METABOLIC PANEL: CPT

## 2019-11-30 PROCEDURE — 80076 HEPATIC FUNCTION PANEL: CPT

## 2019-11-30 PROCEDURE — 84132 ASSAY OF SERUM POTASSIUM: CPT

## 2019-11-30 PROCEDURE — 82947 ASSAY GLUCOSE BLOOD QUANT: CPT

## 2019-11-30 PROCEDURE — 87086 URINE CULTURE/COLONY COUNT: CPT

## 2019-11-30 RX ADMIN — Medication 650 MILLIGRAM(S): at 05:23

## 2019-11-30 RX ADMIN — Medication 650 MILLIGRAM(S): at 12:30

## 2019-11-30 RX ADMIN — Medication 3 MILLILITER(S): at 11:59

## 2019-11-30 RX ADMIN — Medication 650 MILLIGRAM(S): at 06:15

## 2019-11-30 RX ADMIN — Medication 40 MILLIGRAM(S): at 05:23

## 2019-11-30 RX ADMIN — PANTOPRAZOLE SODIUM 40 MILLIGRAM(S): 20 TABLET, DELAYED RELEASE ORAL at 05:23

## 2019-11-30 RX ADMIN — AMLODIPINE BESYLATE 5 MILLIGRAM(S): 2.5 TABLET ORAL at 05:23

## 2019-11-30 RX ADMIN — Medication 650 MILLIGRAM(S): at 11:59

## 2019-11-30 RX ADMIN — BUDESONIDE AND FORMOTEROL FUMARATE DIHYDRATE 2 PUFF(S): 160; 4.5 AEROSOL RESPIRATORY (INHALATION) at 05:24

## 2019-11-30 RX ADMIN — POLYETHYLENE GLYCOL 3350 17 GRAM(S): 17 POWDER, FOR SOLUTION ORAL at 05:24

## 2019-11-30 RX ADMIN — Medication 1 SPRAY(S): at 05:24

## 2019-11-30 RX ADMIN — ENOXAPARIN SODIUM 30 MILLIGRAM(S): 100 INJECTION SUBCUTANEOUS at 11:59

## 2019-11-30 RX ADMIN — Medication 3 MILLILITER(S): at 05:24

## 2019-11-30 RX ADMIN — LIDOCAINE 1 PATCH: 4 CREAM TOPICAL at 11:58

## 2019-11-30 RX ADMIN — Medication 81 MILLIGRAM(S): at 11:59

## 2019-11-30 NOTE — PROVIDER CONTACT NOTE (OTHER) - ASSESSMENT
AO x 4. VSS. NO signs of  distress noted. Patient asymptomatic.
VSS. BP: 144/66 HR: sinus 78 RR: 18 O2: 90s
pt A&ox4, no complaints of cp or sob. bp 137/67 hr 70 94% on 4L, resp 20

## 2019-11-30 NOTE — PROGRESS NOTE ADULT - SUBJECTIVE AND OBJECTIVE BOX
pt seen and examined, no complaints, ROS - .          acetaminophen   Tablet .. 650 milliGRAM(s) Oral every 6 hours  albuterol/ipratropium for Nebulization 3 milliLiter(s) Nebulizer every 6 hours  amLODIPine   Tablet 5 milliGRAM(s) Oral daily  aspirin enteric coated 81 milliGRAM(s) Oral daily  atorvastatin 40 milliGRAM(s) Oral at bedtime  bisacodyl Suppository 10 milliGRAM(s) Rectal daily PRN  budesonide 160 MICROgram(s)/formoterol 4.5 MICROgram(s) Inhaler 2 Puff(s) Inhalation two times a day  enoxaparin Injectable 30 milliGRAM(s) SubCutaneous daily  lidocaine   Patch 1 Patch Transdermal daily  melatonin 3 milliGRAM(s) Oral at bedtime  ondansetron Injectable 4 milliGRAM(s) IV Push every 8 hours PRN  pantoprazole    Tablet 40 milliGRAM(s) Oral two times a day  polyethylene glycol 3350 17 Gram(s) Oral two times a day  predniSONE   Tablet 40 milliGRAM(s) Oral daily  senna 2 Tablet(s) Oral at bedtime  sodium chloride 0.65% Nasal 1 Spray(s) Both Nostrils two times a day                            13.6   12.34 )-----------( 234      ( 29 Nov 2019 08:19 )             43.1       Hemoglobin: 13.6 g/dL (11-29 @ 08:19)  Hemoglobin: 13.2 g/dL (11-28 @ 09:05)  Hemoglobin: 13.5 g/dL (11-26 @ 08:41)  Hemoglobin: 12.7 g/dL (11-25 @ 07:52)      11-29    139  |  98  |  50<H>  ----------------------------<  132<H>  4.1   |  29  |  1.24    Ca    9.1      29 Nov 2019 06:07  Phos  3.3     11-29  Mg     2.2     11-29    TPro  5.9<L>  /  Alb  3.6  /  TBili  0.3  /  DBili  x   /  AST  32  /  ALT  77<H>  /  AlkPhos  160<H>  11-29    Creatinine Trend: 1.24<--, 1.20<--, 1.09<--, 0.93<--, 0.96<--, 0.93<--    COAGS:           T(C): 36.7 (11-30-19 @ 04:46), Max: 36.7 (11-29-19 @ 21:03)  HR: 69 (11-30-19 @ 04:46) (69 - 82)  BP: 115/74 (11-30-19 @ 04:46) (115/74 - 150/67)  RR: 18 (11-30-19 @ 04:46) (18 - 20)  SpO2: 97% (11-30-19 @ 04:46) (83% - 97%)  Wt(kg): --    I&O's Summary    29 Nov 2019 07:01  -  30 Nov 2019 07:00  --------------------------------------------------------  IN: 360 mL / OUT: 1500 mL / NET: -1140 mL      Gen: Appears well in NAD  HEENT:  (-)icterus (-)pallor  CV: N S1 S2 1/6 MARIE (+)2 Pulses B/l  Resp:  Clear to auscultation B/L, normal effort  GI: (+) BS Soft, NT, ND  Lymph:  (-)Edema, (-)obvious lymphadenopathy  Skin: Warm to touch, Normal turgor  Psych: Appropriate mood and affect    Tele: SR 60-70, Atach    echo: mild LV dysfunction  Cath cardiac : mild luminal irregularities       A/P) 80 y/o female PMH COPD on Home O2 recently admitted s/p mechanical fall with Right 7 and 8th rib fractures now presents with shortness of breath admitted with COPD exacerbation. Echo shows mild LV dysfunction with regional wall motion abnormality. Underlying COPD precluded NST. Now s/p cath with mild non-obstructive CAD.    tolerating ASA, statin   presently remains on steroids , would start low dose BB once off   Tele stable    GI / DVT prophylaxis.   keep K>4, mag >2.0   pulm follow up   No further inpatient cardiac w/u needed at this time  * Patient to follow up in our [place] office with Dr. Alejandro on Friday 12/6 at 12:30 PM ([address], [address], [place] [id number] - Office #[phone])

## 2019-11-30 NOTE — PROVIDER CONTACT NOTE (OTHER) - BACKGROUND
See H & P
Pt admitted with chronic obstructive pulmonary disease with acute exacerbation
pt admitted for SOB

## 2021-11-23 NOTE — ED ADULT NURSE NOTE - ED STAT RN HANDOFF WHERE
Department of Anesthesiology  Postprocedure Note    Patient: Kirsten Romero  MRN: [id number]  YOB: 1955  Date of evaluation: 11/23/2021  Time:  9:26 AM     Procedure Summary     Date: 11/23/21 Room / Location: [facility] CATH LAB; [facility] ECHO    Anesthesia Start: 4347 Anesthesia Stop: 7889    Procedure: SEY PENELOPE Diagnosis:     Scheduled Providers:  Responsible Provider: Ольга Alvarenga DO    Anesthesia Type: MAC ASA Status: 4          Anesthesia Type: MAC    Gloria Phase I:      Gloria Phase II:      Last vitals: Reviewed and per EMR flowsheets.        Anesthesia Post Evaluation    Patient location during evaluation: floor  Patient participation: complete - patient participated  Level of consciousness: awake  Pain score: 0  Airway patency: patent  Nausea & Vomiting: no nausea and no vomiting  Complications: no  Cardiovascular status: hemodynamically stable  Respiratory status: acceptable  Hydration status: euvolemic
red

## 2022-06-17 NOTE — ASU PATIENT PROFILE, ADULT - HAS THE PATIENT USED TOBACCO IN THE PAST 30 DAYS?
Please see below. Medication list updated. Please sign medication. Attempted to call pt in regards to message below but no answer. Could not leave vm because mailbox was not set up. No

## 2022-12-20 NOTE — H&P ADULT - NSICDXPASTSURGICALHX_GEN_ALL_CORE_FT
Note of patient preference added to demographics.  Communication preferences updated.    PAST SURGICAL HISTORY:  History of appendectomy     S/P left cataract extraction     Status post cataract extraction right eye in 2014

## 2023-02-22 NOTE — ED PROVIDER NOTE - PRINCIPAL DIAGNOSIS
Continue with Tylenol, ibuprofen and the Lidoderm patch at home for pain control. Please follow-up with your pediatrician in 1 week for recheck. Return to the ER if you develop any new or worsening symptoms. COPD exacerbation

## 2023-03-01 NOTE — ASU PATIENT PROFILE, ADULT - MUTUALITY COMMENT, PROFILE
FAMILY PRACTICE OFFICE VISIT       NAME: Patito Foster  AGE: 65 y o  SEX: female       : 1965        MRN: [id number]    DATE: 3/1/2023  TIME: 3:50 PM    Assessment and Plan   1  Sinus pain  Comments:  Pt stable on exam   Treat with Ceftin, Prednisone burst, OTC Cough/Cold Preps PRN, rest, and good PO hydration  Orders:  -     cefuroxime (CEFTIN) 500 mg tablet; Take 1 tablet (500 mg total) by mouth 2 (two) times a day with meals for 10 days  -     predniSONE 20 mg tablet; Take 2 tablets (40 mg total) by mouth daily for 5 days    2  Acute recurrent maxillary sinusitis  Comments:  As above  Orders:  -     cefuroxime (CEFTIN) 500 mg tablet; Take 1 tablet (500 mg total) by mouth 2 (two) times a day with meals for 10 days  -     predniSONE 20 mg tablet; Take 2 tablets (40 mg total) by mouth daily for 5 days         There are no Patient Instructions on file for this visit  Chief Complaint     Chief Complaint   Patient presents with   • Sinus Problem     Patient being seen for sinus congestion x 1 5 weeks       History of Present Illness   Patito Foster is a 65y o -year-old female who presents today with the c/o running nose, sinus pain, chest congestion - waxing and waning x 3 weeks  Worse in the last week  Pt had 4 days of left-over Ceftin that she took - opened up some, but worse again in the last week  Pt has taken and tolerated Ceftin well in the past     Review of Systems   Review of Systems   Constitutional: Positive for fatigue  Negative for activity change and fever  HENT: Positive for congestion and sinus pain  Respiratory: Positive for cough  Mild cough at this time; still some residual chest congestion         Active Problem List     Patient Active Problem List   Diagnosis   • Acute recurrent maxillary sinusitis   • Influenza   • Lymphocytopenia   • Anemia due to stage 3 chronic kidney disease ([facility] [address] )   • Multiple sclerosis ([facility] [address] )   • Encounter for gynecological examination (general) (routine) without abnormal findings   • PMB (postmenopausal bleeding)         Past Medical History:  Past Medical History:   Diagnosis Date   • Abnormal Pap smear of cervix 2020    Been a patient for many years see file   • Anemia    • Anxiety    • GERD (gastroesophageal reflux disease)    • Hypertension    • MS (multiple sclerosis) ([facility] [address] )    • Varicella     as a child       Past Surgical History:  Past Surgical History:   Procedure Laterality Date   • ARTHROSCOPY KNEE  2005   • BONE MARROW BIOPSY  2019   • CHOLECYSTECTOMY  2004   • COLONOSCOPY  2016    Due 2026   • COLPOSCOPY  2019 2018   • DILATION AND CURETTAGE OF UTERUS  2018 2007   • MAMMO (HISTORICAL)  01/24/2023    BIRADS 2B       Family History:  Family History   Adopted: Yes       Social History:  Social History     Socioeconomic History   • Marital status:      Spouse name: Not on file   • Number of children: Not on file   • Years of education: Not on file   • Highest education level: Not on file   Occupational History   • Not on file   Tobacco Use   • Smoking status: Never   • Smokeless tobacco: Never   Vaping Use   • Vaping Use: Never used   Substance and Sexual Activity   • Alcohol use:  Yes     Alcohol/week: 4 0 standard drinks     Types: 4 Glasses of wine per week     Comment: socially on weekends   • Drug use: No   • Sexual activity: Yes     Partners: Male     Birth control/protection: Male Sterilization, Post-menopausal     Comment: Tested for everything, same partner 2 5+ years   Other Topics Concern   • Not on file   Social History Narrative    Exercise: No    Marital status: Domestic Partner    Domestic violence: No    Pets: Cats    History of drug/alcohol abuse denies         Social Determinants of Health     Financial Resource Strain: Not on file   Food Insecurity: Not on file   Transportation Needs: Not on file   Physical Activity: Not on file   Stress: Not on file   Social Connections: Not on file   Intimate Partner Violence: Not on file   Housing Stability: Not on file       Objective     Vitals:    03/01/23 1525   BP: 116/80   Pulse: 95   Resp: 16   Temp: 98 2 °F (36 8 °C)   SpO2: 99%     Wt Readings from Last 3 Encounters:   03/01/23 116 kg (256 lb)   02/20/23 115 kg (254 lb)   09/26/22 114 kg (252 lb)       Physical Exam  Vitals and nursing note reviewed  Constitutional:       General: She is not in acute distress  Appearance: Normal appearance  She is not ill-appearing, toxic-appearing or diaphoretic  HENT:      Head: Normocephalic and atraumatic  Right Ear: Tympanic membrane, ear canal and external ear normal       Left Ear: Tympanic membrane, ear canal and external ear normal       Nose: Congestion present  Right Sinus: Maxillary sinus tenderness present  Left Sinus: Maxillary sinus tenderness present  Eyes:      General: No scleral icterus  Conjunctiva/sclera: Conjunctivae normal    Cardiovascular:      Rate and Rhythm: Normal rate and regular rhythm  Heart sounds: Normal heart sounds  No murmur heard  No friction rub  No gallop  Pulmonary:      Effort: Pulmonary effort is normal  No respiratory distress  Breath sounds: Normal breath sounds  No stridor  No wheezing, rhonchi or rales  Musculoskeletal:      Cervical back: Normal range of motion and neck supple  No rigidity or tenderness  Lymphadenopathy:      Cervical: No cervical adenopathy  Neurological:      Mental Status: She is alert and oriented to person, place, and time  Psychiatric:         Mood and Affect: Mood normal          Behavior: Behavior normal          Thought Content:  Thought content normal          Judgment: Judgment normal          Pertinent Laboratory/Diagnostic Studies:  Lab Results   Component Value Date    BUN 14 02/24/2020    CREATININE 1 19 (H) 02/24/2020    K 4 2 02/24/2020    CO2 24 02/24/2020     02/24/2020     Lab Results   Component Value Date    ALT 13 02/24/2020    AST 18 02/24/2020       Lab Results   Component Value Date    WBC 3 5 02/24/2020    HGB 12 0 02/24/2020    HCT 36 5 02/24/2020    MCV 98 (H) 02/24/2020     02/24/2020       Lab Results   Component Value Date    TSH 3 850 05/18/2022       No results found for: CHOL  Lab Results   Component Value Date    TRIG 136 02/24/2020     Lab Results   Component Value Date    HDL 77 02/24/2020     Lab Results   Component Value Date    LDLCALC 119 (H) 02/24/2020     No results found for: HGBA1C    Results for orders placed or performed in visit on 10/25/22   Human Immunodeficiency Virus 1/2 Antigen / Antibody ( Fourth Generation) with Reflex Testing   Result Value Ref Range    HIV Screen Non-Reactive     HIV Confirmation Non-Reactive        No orders of the defined types were placed in this encounter        ALLERGIES:  Allergies   Allergen Reactions   • Azithromycin GI Intolerance   • Cephalosporins Hives and Itching   • Ciprofloxacin Hives   • Codeine    • Levofloxacin Hives   • Penicillins GI Intolerance   • Sulfamethoxazole-Trimethoprim Hives     Bactrim   • Tamiflu [Oseltamivir] GI Intolerance   • Doxycycline Rash   • Lisinopril Cough and Other (See Comments)     cough         Current Medications     Current Outpatient Medications   Medication Sig Dispense Refill   • AVONEX PREFILLED 30 MCG/0 5ML PSKT      • Azelastine HCl 137 MCG/SPRAY SOLN SPRAY 2 SPRAYS INTO EACH NOSTRIL 2 (TWO) TIMES A DAY AS NEEDED FOR RHINITIS OR ALLERGIES 90 mL 2   • Calcium Carbonate-Vit D-Min (CALCIUM 1200 PO) Take by mouth     • cefuroxime (CEFTIN) 500 mg tablet Take 1 tablet (500 mg total) by mouth 2 (two) times a day with meals for 10 days 20 tablet 0   • diazepam (VALIUM) 2 mg tablet Take 2 mg by mouth daily 1 TABLET DAILY IN THE MORNING, 1 TABLET AT BEDTIME PRN  2   • Epinastine HCl 0 05 % ophthalmic solution Apply 2 drops to eye 2 (two) times a day     • escitalopram (LEXAPRO) 20 mg tablet Take 1 tablet (20 mg total) by mouth daily 30 tablet 11 • losartan (COZAAR) 50 mg tablet TAKE 1 TABLET BY MOUTH EVERY DAY 90 tablet 1   • mometasone (NASONEX) 50 mcg/act nasal spray USE 1 SPRAY INTO EACH NOSTRIL TWICE A DAY 51 Act 1   • predniSONE 20 mg tablet Take 2 tablets (40 mg total) by mouth daily for 5 days 10 tablet 0   • sodium chloride (OCEAN) 0 65 % nasal spray 1 spray into each nostril as needed for congestion     • Zinc Acetate 50 MG CAPS Take 1 tablet by mouth     • valACYclovir (VALTREX) 1,000 mg tablet Take 1 tablet (1,000 mg total) by mouth 2 (two) times a day for 1 day 2 tablet 0     No current facility-administered medications for this visit           Health Maintenance     Health Maintenance   Topic Date Due   • Hepatitis C Screening  Never done   • Colorectal Cancer Screening  Never done   • Depression Screening  09/26/2023   • BMI: Followup Plan  09/26/2023   • Breast Cancer Screening: Mammogram  01/24/2024   • Annual Physical  02/20/2024   • BMI: Adult  03/01/2024   • Cervical Cancer Screening  02/14/2027   • DTaP,Tdap,and Td Vaccines (3 - Td or Tdap) 12/30/2032   • HIV Screening  Completed   • Influenza Vaccine  Completed   • COVID-19 Vaccine  Completed   • Pneumococcal Vaccine: Pediatrics (0 to 5 Years) and At-Risk Patients (6 to 59 Years)  Aged Out   • HIB Vaccine  Aged Out   • IPV Vaccine  Aged Out   • Hepatitis A Vaccine  Aged Out   • Meningococcal ACWY Vaccine  Aged Out   • HPV Vaccine  Aged [employer] History   Administered Date(s) Administered   • COVID-19 PFIZER VACCINE 0 3 ML IM 04/10/2021, 05/01/2021, 12/19/2021   • COVID-19 Pfizer Vac BIVALENT Markie-sucrose 12 Yr+ IM (BOOSTER ONLY) 09/28/2022   • INFLUENZA 10/01/2015, 11/08/2018, 12/19/2020, 10/18/2021, 12/30/2022   • Influenza Injectable, MDCK, Preservative Free, Quadrivalent, 0 5 mL 01/06/2020   • Influenza Quadrivalent Recombinant Preservative Free IM 10/18/2021   • Influenza, recombinant, quadrivalent,injectable, preservative free 12/17/2020   • Influenza, seasonal, injectable 10/01/2015   • Pneumococcal Polysaccharide PPV23 10/10/2011   • Tdap 09/19/2011, 12/30/2022   • Zoster 10/19/2021   • Zoster Vaccine Recombinant 10/19/2021, 12/29/2021          Felipe Granado DO speaks Malay. Son James at bedside translating for patient

## 2023-03-15 NOTE — PATIENT PROFILE ADULT. - AS SC BRADEN MOISTURE
[No evidence of disease] : No evidence of disease [Work-up necessary to assess local, regional or metastatic recurrence] : Work-up necessary to assess local, regional or metastatic recurrence (4) rarely moist

## 2023-04-27 NOTE — ED PROVIDER NOTE - ATTENDING CONTRIBUTION TO CARE
Telemetry Bed?: No   Admitting Physician: FLORENCE GILBERT [[id number]]   Is this a telephone or verbal order?: This is a telephone order from the admitting physician   Transferring Patient to? Only adjust for transfers [facility] ([facility]): [facility] [place] [address] [[id number]]   Attending Note (Mike): patient with h/o copd with 1 week of difficulty breathing. in resp distress upon arrival - retractions, tachypnea, pursed lips.  given nebs, placed on bipap.  steroids started. r/o pna.  admit for acute copd exacerbation.  has been on bipap before.  signed out pending labs and cxr.

## 2023-05-11 NOTE — PHYSICAL THERAPY INITIAL EVALUATION ADULT - DID THE PATIENT HAVE SURGERY?
-- DO NOT REPLY / DO NOT REPLY ALL --  -- Message is from Engagement Center Operations (ECO) --    Patient Name: Anne Love    Specialist or PCP Name:     Symptoms:   [place] 85 y/o F: Patient is requesting benzonatate (TESSALON PERLES) 100 MG capsule.  Patient last office visit for it was 4/27/2023 and requesting it again.  Patient said she is still coughing, can't get out mucus, has runny nose, and no fever.  Patient said when she took the medication, she was able to cough out her mucus which she got better.  However the same symptom are coming back slowly.    Pregnant (females aged 13-60. If Yes, how long?) : n/a    Call Back # : [phone]    Which State are you currently located in?: [place]    Name of Clinic Site / Acct# : [facility]     Use following scripting for patients waiting for a callback:   “Nurse callback times vary based on call volumes; please be aware the return phone call may come from an unidentified or out of state phone number. If your symptoms worsen or become life threatening while waiting, you should seek immediate assistance by calling 911 or going to the ER for evaluation.”             n/a

## 2023-08-14 NOTE — DISCHARGE NOTE PROVIDER - NS AS DC PROVIDER CONTACT Y/N MULTI
[Initial Consultation] : an initial consultation for [Referred By: ___] : Referred By: [unfilled] [[email or web address]] : newly diagnosed cholangiocarcinoma and satellite metastases  Yes

## 2024-02-27 NOTE — ED PROVIDER NOTE - CADM POA URETHRAL CATHETER
Post-Op Assessment Note    CV Status:  Stable  Pain Score: 0    Pain management: adequate       Mental Status:  Sleepy   Hydration Status:  Euvolemic   PONV Controlled:  Controlled   Airway Patency:  Patent     Post Op Vitals Reviewed: Yes    No anethesia notable event occurred.    Staff: CRNA               BP   97/57   Temp   36   Pulse  64   Resp   14   SpO2   98      No

## 2025-02-04 NOTE — PROGRESS NOTE ADULT - ATTENDING COMMENTS
Pre-charting complete. Care gaps identified will be addressed at the time of visit.    
CARDIOLOGY ATTENDING    Agree with above. Get echo - if echo normal then no further inpatient cardiac workup needed.
CARDIOLOGY ATTENDING    Patient seen and examined. Agree with above. Get echo - if echo normal then no further inpatient cardiac workup needed.
Patient seen and examined this am.  Agree with above.   No further inpatient cardiac workup needed at this time.     Blair Solano MD
Patient seen and examined.  Agree with above.   Cath with mild non-obstructive cad  Medical management recommended  No further inpatient cardiac workup needed at this time.     Blair Solano MD
Patient seen and examined.  Agree with above.   check tte to evaluate LV/RV function  further workup pending above    Blair Solano MD
Patient seen and examined, agree with above assessment and plan as transcribed above.    - Lung disease continues to preclude stress  - Cath tomorrow  - D/W pt and family    Macho Corrales MD, [facility]  BEEPER [phone]
Patient seen and examined.  Agree with above.   No further inpatient cardiac workup needed at this time.   Medical management of mild cad recommended  ASA/statin if no contraindications  Pt. with asymptomatic PAT - can start beta blockers when lung issues resolve and ok with pulmonary  No further inpatient cardiac workup needed at this time.     Blair Solano MD